# Patient Record
Sex: FEMALE | Race: WHITE | NOT HISPANIC OR LATINO | Employment: FULL TIME | URBAN - METROPOLITAN AREA
[De-identification: names, ages, dates, MRNs, and addresses within clinical notes are randomized per-mention and may not be internally consistent; named-entity substitution may affect disease eponyms.]

---

## 2017-01-09 RX ORDER — METFORMIN HYDROCHLORIDE 500 MG/1
TABLET ORAL
Qty: 60 TABLET | Refills: 0 | OUTPATIENT
Start: 2017-01-09

## 2017-01-23 ENCOUNTER — OFFICE VISIT (OUTPATIENT)
Dept: INTERNAL MEDICINE | Facility: CLINIC | Age: 62
End: 2017-01-23
Payer: COMMERCIAL

## 2017-01-23 ENCOUNTER — LAB VISIT (OUTPATIENT)
Dept: LAB | Facility: HOSPITAL | Age: 62
End: 2017-01-23
Attending: FAMILY MEDICINE
Payer: COMMERCIAL

## 2017-01-23 VITALS
WEIGHT: 223.56 LBS | BODY MASS INDEX: 39.6 KG/M2 | SYSTOLIC BLOOD PRESSURE: 134 MMHG | DIASTOLIC BLOOD PRESSURE: 78 MMHG | TEMPERATURE: 97 F

## 2017-01-23 DIAGNOSIS — E11.59 HYPERTENSION ASSOCIATED WITH DIABETES: Chronic | ICD-10-CM

## 2017-01-23 DIAGNOSIS — E11.9 TYPE 2 DIABETES MELLITUS WITHOUT COMPLICATION, WITHOUT LONG-TERM CURRENT USE OF INSULIN: Primary | Chronic | ICD-10-CM

## 2017-01-23 DIAGNOSIS — I15.2 HYPERTENSION ASSOCIATED WITH DIABETES: Chronic | ICD-10-CM

## 2017-01-23 DIAGNOSIS — E78.5 HYPERLIPIDEMIA ASSOCIATED WITH TYPE 2 DIABETES MELLITUS: Chronic | ICD-10-CM

## 2017-01-23 DIAGNOSIS — E11.9 TYPE 2 DIABETES MELLITUS WITHOUT COMPLICATION, WITHOUT LONG-TERM CURRENT USE OF INSULIN: Chronic | ICD-10-CM

## 2017-01-23 DIAGNOSIS — E11.69 HYPERLIPIDEMIA ASSOCIATED WITH TYPE 2 DIABETES MELLITUS: Chronic | ICD-10-CM

## 2017-01-23 PROCEDURE — 2022F DILAT RTA XM EVC RTNOPTHY: CPT | Mod: S$GLB,,, | Performed by: FAMILY MEDICINE

## 2017-01-23 PROCEDURE — 83036 HEMOGLOBIN GLYCOSYLATED A1C: CPT

## 2017-01-23 PROCEDURE — 1159F MED LIST DOCD IN RCRD: CPT | Mod: S$GLB,,, | Performed by: FAMILY MEDICINE

## 2017-01-23 PROCEDURE — 99999 PR PBB SHADOW E&M-EST. PATIENT-LVL III: CPT | Mod: PBBFAC,,, | Performed by: FAMILY MEDICINE

## 2017-01-23 PROCEDURE — 3075F SYST BP GE 130 - 139MM HG: CPT | Mod: S$GLB,,, | Performed by: FAMILY MEDICINE

## 2017-01-23 PROCEDURE — 3078F DIAST BP <80 MM HG: CPT | Mod: S$GLB,,, | Performed by: FAMILY MEDICINE

## 2017-01-23 PROCEDURE — 4010F ACE/ARB THERAPY RXD/TAKEN: CPT | Mod: S$GLB,,, | Performed by: FAMILY MEDICINE

## 2017-01-23 PROCEDURE — 36415 COLL VENOUS BLD VENIPUNCTURE: CPT | Mod: PO

## 2017-01-23 PROCEDURE — 3044F HG A1C LEVEL LT 7.0%: CPT | Mod: S$GLB,,, | Performed by: FAMILY MEDICINE

## 2017-01-23 PROCEDURE — 99214 OFFICE O/P EST MOD 30 MIN: CPT | Mod: S$GLB,,, | Performed by: FAMILY MEDICINE

## 2017-01-23 RX ORDER — ACETAMINOPHEN 500 MG
1 TABLET ORAL DAILY
COMMUNITY

## 2017-01-23 RX ORDER — METFORMIN HYDROCHLORIDE 500 MG/1
500 TABLET ORAL
Qty: 90 TABLET | Refills: 3 | Status: SHIPPED | OUTPATIENT
Start: 2017-01-23 | End: 2017-04-24 | Stop reason: SDUPTHER

## 2017-01-23 RX ORDER — ASPIRIN 81 MG/1
81 TABLET ORAL DAILY
COMMUNITY

## 2017-01-23 NOTE — PROGRESS NOTES
Subjective:   Patient ID: May Montoya is a 61 y.o. female.  Chief Complaint:  Follow-up    HPI Comments: Patient presents follow-up on prediabetes diabetes.  Last visit June 2016.  A1c 6.5%.  LDL 91.  CMP normal.  Hep C negative.  On metformin 500 mg daily.  Due repeat A1c.  Blood pressure and lipids have been stable and controlled on present medical regimen.  Needs referral for eye exam  Reports doing here up-to-date last month by outside physician.  Reports flu vaccine at pharmacy.  No new complaints concerns today.    Review of Systems   Constitutional: Negative for chills, diaphoresis, fatigue and fever.   Eyes: Negative for visual disturbance.   Respiratory: Negative for cough, chest tightness, shortness of breath and wheezing.    Cardiovascular: Negative for chest pain, palpitations and leg swelling.   Gastrointestinal: Negative for abdominal distention, abdominal pain, constipation, diarrhea, nausea and vomiting.   Endocrine: Negative for polydipsia, polyphagia and polyuria.   Genitourinary: Negative for difficulty urinating, dysuria, flank pain, frequency, hematuria and urgency.   Musculoskeletal: Negative for myalgias.   Skin: Negative for rash.   Neurological: Negative for dizziness, syncope, weakness, light-headedness, numbness and headaches.     Objective:     Visit Vitals    /78    Temp 97.1 °F (36.2 °C) (Tympanic)    Wt 101.4 kg (223 lb 8.7 oz)    BMI 39.6 kg/m2     Physical Exam   Constitutional: Vital signs are normal. She appears well-developed and well-nourished. No distress.   Eyes: No scleral icterus.   Neck: No JVD present. Carotid bruit is not present.   Cardiovascular: Normal rate, regular rhythm and normal heart sounds.  Exam reveals no gallop and no friction rub.    No murmur heard.  Pulses:       Dorsalis pedis pulses are 2+ on the right side, and 2+ on the left side.        Posterior tibial pulses are 2+ on the right side, and 2+ on the left side.   Pulmonary/Chest: Effort  normal and breath sounds normal. She has no wheezes. She has no rhonchi. She has no rales.   Abdominal: Soft. She exhibits no distension. There is no hepatosplenomegaly. There is no tenderness. There is no rebound and no guarding.   Musculoskeletal: She exhibits edema (1+ bilaterally). She exhibits no tenderness.        Right foot: There is normal range of motion and no deformity.        Left foot: There is normal range of motion and no deformity.   Feet:   Right Foot:   Protective Sensation: 10 sites tested. 10 sites sensed.   Skin Integrity: Negative for ulcer, blister, skin breakdown, erythema, warmth, callus or dry skin.   Left Foot:   Protective Sensation: 10 sites tested. 10 sites sensed.   Skin Integrity: Negative for ulcer, blister, skin breakdown, erythema, warmth, callus or dry skin.   Neurological: She displays a negative Romberg sign. Coordination and gait normal.   Skin: Skin is warm, dry and intact. No rash noted.        Psychiatric: She has a normal mood and affect.   Nursing note and vitals reviewed.    Assessment:     1. Type 2 diabetes mellitus without complication, without long-term current use of insulin    2. Hypertension associated with diabetes    3. Hyperlipidemia associated with type 2 diabetes mellitus      Plan:   Type 2 diabetes mellitus without complication, without long-term current use of insulin  -     Hemoglobin A1c; Future; Expected date: 1/23/17  -     Ambulatory referral to Ophthalmology  Check labs.  Adjust metformin daily if indicated.  If stable, continue present dose and recheck A1c in 6 months    Hypertension associated with diabetes  Controlled.  Continue present medications.    Hyperlipidemia associated with type 2 diabetes mellitus  LDL at goal.  Continue Crestor.    Return to clinic 6 months or sooner if needed.

## 2017-01-23 NOTE — MR AVS SNAPSHOT
[facility]  [address] [place] [address]  Phone: [phone]  Fax: [phone]                  May Montoya   2017 10:00 AM   Office Visit    Description:  Female : 1955   Provider:  Macho Castrejon MD   Department:  [facility]           Reason for Visit     Follow-up           Diagnoses this Visit        Comments    Type 2 diabetes mellitus without complication, without long-term current use of insulin    -  Primary     Hypertension associated with diabetes         Hyperlipidemia associated with type 2 diabetes mellitus                To Do List           Future Appointments        Provider Department Dept Phone    2017 1:45 PM LAB, SAME DAY [facility] [phone]    2017 10:20 AM Macho Castrejon MD [facility] [phone]      Goals (5 Years of Data)     None      Follow-Up and Disposition     Return in about 6 months (around 2017).      [facility]     [facility] -  Assistance  Registered nurses in the [facility] provide clinical advisement, health education, appointment booking, and other advisory services.  Call for this free service at [phone].             Medications           Message regarding Medications     Verify the changes and/or additions to your medication regime listed below are the same as discussed with your clinician today.  If any of these changes or additions are incorrect, please notify your healthcare provider.             Verify that the below list of medications is an accurate representation of the medications you are currently taking.  If none reported, the list may be blank. If incorrect, please contact your healthcare provider. Carry this list with you in case of emergency.           Current Medications     amlodipine-benazepril 2.5-10 mg (LOTREL) 2.5-10 mg per capsule Take 1 capsule by mouth once daily.    aspirin  (ECOTRIN) 81 MG EC tablet Take 81 mg by mouth once daily.    CELEBREX 200 mg capsule Take 400 mg by mouth once daily.     cetirizine (ZYRTEC) 5 MG tablet Take 5 mg by mouth once daily.    cholecalciferol, vitamin D3, (VITAMIN D3) 2,000 unit Cap Take 1 capsule by mouth once daily.    GLUCOSAMINE/D3/BOSWELLIA MAYELIN (OSTEO BI-FLEX, 5-LOXIN, ORAL) Take 2 tablets by mouth once daily.    metaxalone (SKELAXIN) 800 MG tablet     metformin (GLUCOPHAGE) 500 MG tablet TAKE 1 TABLET (500 MG TOTAL) BY MOUTH 2 (TWO) TIMES DAILY WITH MEALS.    multivit-mineral-iron-lutein (CENTRUM SILVER ULTRA WOMEN'S) Tab Take 1 tablet by mouth once daily.    rosuvastatin (CRESTOR) 20 MG tablet Take 1 tablet (20 mg total) by mouth every evening.    tramadol (ULTRAM) 50 mg tablet     triamcinolone acetonide 0.1% (KENALOG) 0.1 % cream     fluticasone (FLONASE) 50 mcg/actuation nasal spray 2 sprays by Each Nare route once daily.           Clinical Reference Information           Vital Signs - Last Recorded  Most recent update: 1/23/2017 11:01 AM by Macho Castrejon MD    BP Temp Wt BMI       134/78 97.1 °F (36.2 °C) (Tympanic) 101.4 kg (223 lb 8.7 oz) 39.6 kg/m2       Blood Pressure          Most Recent Value    BP  134/78      Allergies as of 1/23/2017     Avelox [Moxifloxacin]      Immunizations Administered on Date of Encounter - 1/23/2017     None      Orders Placed During Today's Visit      Normal Orders This Visit    Ambulatory referral to Ophthalmology     Future Labs/Procedures Expected by Expires    Hemoglobin A1c  1/23/2017 4/23/2017      [facility] Sign-Up     Activating your [facility] account is as easy as 1-2-3!     1) Visit [email or web address], select Sign Up Now, enter this activation code and your date of birth, then select Next.  [email or web address]  Expires: 3/9/2017 11:03 AM      2) Create a username and password to use when you visit [facility] in the future and select a security question in case you lose your password and select  Next.    3) Enter your e-mail address and click Sign Up!    Additional Information  If you have questions, please e-mail [email or web address] or call [phone] to talk to our [facility] staff. Remember, [facility] is NOT to be used for urgent needs. For medical emergencies, dial 911.

## 2017-01-24 LAB
ESTIMATED AVG GLUCOSE: 148 MG/DL
HBA1C MFR BLD HPLC: 6.8 %

## 2017-01-25 ENCOUNTER — TELEPHONE (OUTPATIENT)
Dept: INTERNAL MEDICINE | Facility: CLINIC | Age: 62
End: 2017-01-25

## 2017-01-25 NOTE — TELEPHONE ENCOUNTER
----- Message from Macho Castrejon MD sent at 1/25/2017  7:36 AM CST -----  A1c stable.  Prediabetes/diabetes well controlled.  Continue metformin daily.  No change in dose.  Recheck in 6 months.

## 2017-01-26 ENCOUNTER — TELEPHONE (OUTPATIENT)
Dept: INTERNAL MEDICINE | Facility: CLINIC | Age: 62
End: 2017-01-26

## 2017-01-26 NOTE — TELEPHONE ENCOUNTER
----- Message from Mariah Starr sent at 1/26/2017  8:06 AM CST -----  Contact: Pt  Pt called and stated she was returning a call to the nurse. She can be reached at [phone].    Thanks,  TF

## 2017-02-06 ENCOUNTER — OFFICE VISIT (OUTPATIENT)
Dept: OPHTHALMOLOGY | Facility: CLINIC | Age: 62
End: 2017-02-06
Payer: COMMERCIAL

## 2017-02-06 DIAGNOSIS — E11.9 DIABETES MELLITUS TYPE 2 WITHOUT RETINOPATHY: Primary | ICD-10-CM

## 2017-02-06 DIAGNOSIS — H52.7 REFRACTIVE ERROR: ICD-10-CM

## 2017-02-06 DIAGNOSIS — Z13.5 GLAUCOMA SCREENING: ICD-10-CM

## 2017-02-06 PROCEDURE — 99999 PR PBB SHADOW E&M-EST. PATIENT-LVL I: CPT | Mod: PBBFAC,,, | Performed by: OPTOMETRIST

## 2017-02-06 PROCEDURE — 92015 DETERMINE REFRACTIVE STATE: CPT | Mod: S$GLB,,, | Performed by: OPTOMETRIST

## 2017-02-06 PROCEDURE — 92004 COMPRE OPH EXAM NEW PT 1/>: CPT | Mod: S$GLB,,, | Performed by: OPTOMETRIST

## 2017-02-06 NOTE — LETTER
February 6, 2017      Macho Castrejon MD  [address] [place] [address]           [facility]  [address] [place] [address]  Phone: [phone]  Fax: [phone]          Patient: May Montoya   MR Number: [id number]   YOB: 1955   Date of Visit: 2/6/2017       Dear Dr. Macho Castrejon:    Thank you for referring May Montoya to me for evaluation. Attached you will find relevant portions of my assessment and plan of care.    If you have questions, please do not hesitate to call me. I look forward to following May Montoya along with you.    Sincerely,    GAMA Rahman, OD    Enclosure  CC:  No Recipients    If you would like to receive this communication electronically, please contact [email or web address] or [phone] to request more information on [facility] access.    For providers and/or their staff who would like to refer a patient to [facility], please contact us through our one-stop-shop provider referral line, Karyn Faulkner, at [phone].    If you feel you have received this communication in error or would no longer like to receive these types of communications, please e-mail [email or web address]

## 2017-02-06 NOTE — PROGRESS NOTES
HPI     Last [facility] exam 11/18/2013  Diabetic/NIDDM  Screening for glaucoma  RE  No visual complaints       Last edited by Frederick Drew MA on 2/6/2017  8:37 AM.         Assessment /Plan     For exam results, see Encounter Report.    Diabetes mellitus type 2 without retinopathy    Cataract, nuclear, bilateral    Glaucoma screening    Refractive error      No diabetic retinopathy OU.  Mild NS cataracts OU = discussed and will follow.  OH OK OU otherwise.  Spec Rx given.  RTC one year.

## 2017-04-24 ENCOUNTER — TELEPHONE (OUTPATIENT)
Dept: INTERNAL MEDICINE | Facility: CLINIC | Age: 62
End: 2017-04-24

## 2017-04-24 DIAGNOSIS — E11.59 HYPERTENSION ASSOCIATED WITH DIABETES: Chronic | ICD-10-CM

## 2017-04-24 DIAGNOSIS — E11.69 HYPERLIPIDEMIA ASSOCIATED WITH TYPE 2 DIABETES MELLITUS: Primary | Chronic | ICD-10-CM

## 2017-04-24 DIAGNOSIS — E11.9 TYPE 2 DIABETES MELLITUS WITHOUT COMPLICATION, WITHOUT LONG-TERM CURRENT USE OF INSULIN: Chronic | ICD-10-CM

## 2017-04-24 DIAGNOSIS — E78.5 HYPERLIPIDEMIA ASSOCIATED WITH TYPE 2 DIABETES MELLITUS: Primary | Chronic | ICD-10-CM

## 2017-04-24 DIAGNOSIS — I15.2 HYPERTENSION ASSOCIATED WITH DIABETES: Chronic | ICD-10-CM

## 2017-04-24 PROBLEM — I10 ESSENTIAL HYPERTENSION: Status: ACTIVE | Noted: 2017-04-24

## 2017-04-24 RX ORDER — ROSUVASTATIN CALCIUM 20 MG/1
20 TABLET, COATED ORAL NIGHTLY
Qty: 90 TABLET | Refills: 3 | Status: SHIPPED | OUTPATIENT
Start: 2017-04-24 | End: 2017-06-23 | Stop reason: SDUPTHER

## 2017-04-24 RX ORDER — AMLODIPINE BESYLATE AND BENAZEPRIL HYDROCHLORIDE 2.5; 1 MG/1; MG/1
1 CAPSULE ORAL DAILY
Qty: 90 CAPSULE | Refills: 3 | Status: SHIPPED | OUTPATIENT
Start: 2017-04-24 | End: 2017-06-23 | Stop reason: SDUPTHER

## 2017-04-24 RX ORDER — METFORMIN HYDROCHLORIDE 500 MG/1
500 TABLET ORAL
Qty: 90 TABLET | Refills: 3 | Status: SHIPPED | OUTPATIENT
Start: 2017-04-24 | End: 2017-06-23 | Stop reason: SDUPTHER

## 2017-04-24 NOTE — TELEPHONE ENCOUNTER
----- Message from Ayaka Ibarra sent at 4/24/2017  8:08 AM CDT -----  Contact: pt  Pt needs refill on Metformin, Amlodipime, Crestor.

## 2017-04-24 NOTE — TELEPHONE ENCOUNTER
Last visit 1/23/17. Last refill for Metformin was 1/23/17, last refill for amlodipine-benazepril was 6/28/16, and last refill for crestor was 6/28/16. Next appt 7/24/17. Please advise.

## 2017-06-23 DIAGNOSIS — E78.5 HYPERLIPIDEMIA ASSOCIATED WITH TYPE 2 DIABETES MELLITUS: Chronic | ICD-10-CM

## 2017-06-23 DIAGNOSIS — E11.69 HYPERLIPIDEMIA ASSOCIATED WITH TYPE 2 DIABETES MELLITUS: Chronic | ICD-10-CM

## 2017-06-23 DIAGNOSIS — E11.59 HYPERTENSION ASSOCIATED WITH DIABETES: Chronic | ICD-10-CM

## 2017-06-23 DIAGNOSIS — E11.9 TYPE 2 DIABETES MELLITUS WITHOUT COMPLICATION, WITHOUT LONG-TERM CURRENT USE OF INSULIN: Chronic | ICD-10-CM

## 2017-06-23 DIAGNOSIS — I15.2 HYPERTENSION ASSOCIATED WITH DIABETES: Chronic | ICD-10-CM

## 2017-06-23 RX ORDER — ROSUVASTATIN CALCIUM 20 MG/1
20 TABLET, COATED ORAL NIGHTLY
Qty: 90 TABLET | Refills: 3 | Status: SHIPPED | OUTPATIENT
Start: 2017-06-23 | End: 2017-09-12

## 2017-06-23 RX ORDER — METFORMIN HYDROCHLORIDE 500 MG/1
500 TABLET ORAL
Qty: 90 TABLET | Refills: 3 | Status: SHIPPED | OUTPATIENT
Start: 2017-06-23 | End: 2018-03-12 | Stop reason: SDUPTHER

## 2017-06-23 RX ORDER — AMLODIPINE BESYLATE AND BENAZEPRIL HYDROCHLORIDE 2.5; 1 MG/1; MG/1
1 CAPSULE ORAL DAILY
Qty: 90 CAPSULE | Refills: 3 | Status: SHIPPED | OUTPATIENT
Start: 2017-06-23 | End: 2017-08-07 | Stop reason: SDUPTHER

## 2017-06-23 NOTE — TELEPHONE ENCOUNTER
----- Message from Aldafide Anton sent at 6/23/2017  8:07 AM CDT -----  Contact: pt  1. What is the name of the medication you are requesting? Metformin, amlodipine, crestor  2. What is the dose? Met-500 mg, amlodipine 10 mg- crestro 20mg  3. How do you take the medication? Orally, topically, etc? orally  4. How often do you take this medication? All daily  5. Do you need a 30 day or 90 day supply? 30  6. How many refills are you requesting? 6  7. What is your preferred pharmacy and location of the pharmacy? [facility]  8. Who can we contact with further questions? Pt [phone]

## 2017-06-23 NOTE — TELEPHONE ENCOUNTER
Last visit 1/23/17. Last refill for Metformin 4/24/17. Last refill for amlodipine 4/24/17. Last refill Crestor 4/24/17.

## 2017-07-10 ENCOUNTER — PATIENT OUTREACH (OUTPATIENT)
Dept: ADMINISTRATIVE | Facility: HOSPITAL | Age: 62
End: 2017-07-10

## 2017-07-10 NOTE — LETTER
July 10, 2017    May Montoya  [address]  [place] [address]             [facility]  [address]  [facility] [address]  Phone: [phone] Dear MsNba Montoya:    [facility] is committed to your overall health.  To help you get the most out of each of your visits, we will review your information to make sure you are up to date on all of your recommended tests and/or procedures.      Macho Castrejon MD has found that you may be due for   Health Maintenance Due   Topic    TETANUS VACCINE     Colonoscopy     Zoster Vaccine     Mammogram     Lipid Panel         If you have had any of the above done at another facility, please bring the records or information with you so that your record at [facility] will be complete.    If you are currently taking medication, please bring it with you to your appointment for review.    We will be happy to assist you with scheduling any necessary appointments or you may contact the [facility] appointment desk at [phone] to schedule at your convenience.     Thank you for choosing [facility] for your healthcare needs,    Gabby WHITE LPN Care Coordinator  [facility]  [phone]

## 2017-08-07 ENCOUNTER — OFFICE VISIT (OUTPATIENT)
Dept: INTERNAL MEDICINE | Facility: CLINIC | Age: 62
End: 2017-08-07
Payer: COMMERCIAL

## 2017-08-07 VITALS
HEIGHT: 63 IN | DIASTOLIC BLOOD PRESSURE: 70 MMHG | HEART RATE: 79 BPM | WEIGHT: 220.88 LBS | TEMPERATURE: 97 F | OXYGEN SATURATION: 97 % | BODY MASS INDEX: 39.14 KG/M2 | SYSTOLIC BLOOD PRESSURE: 118 MMHG

## 2017-08-07 DIAGNOSIS — E11.69 HYPERLIPIDEMIA ASSOCIATED WITH TYPE 2 DIABETES MELLITUS: Chronic | ICD-10-CM

## 2017-08-07 DIAGNOSIS — I15.2 HYPERTENSION ASSOCIATED WITH DIABETES: Chronic | ICD-10-CM

## 2017-08-07 DIAGNOSIS — E11.59 HYPERTENSION ASSOCIATED WITH DIABETES: Chronic | ICD-10-CM

## 2017-08-07 DIAGNOSIS — E78.5 HYPERLIPIDEMIA ASSOCIATED WITH TYPE 2 DIABETES MELLITUS: Chronic | ICD-10-CM

## 2017-08-07 DIAGNOSIS — E11.9 TYPE 2 DIABETES MELLITUS WITHOUT COMPLICATION, WITHOUT LONG-TERM CURRENT USE OF INSULIN: Primary | Chronic | ICD-10-CM

## 2017-08-07 PROCEDURE — 99214 OFFICE O/P EST MOD 30 MIN: CPT | Mod: S$GLB,,, | Performed by: FAMILY MEDICINE

## 2017-08-07 PROCEDURE — 4010F ACE/ARB THERAPY RXD/TAKEN: CPT | Mod: S$GLB,,, | Performed by: FAMILY MEDICINE

## 2017-08-07 PROCEDURE — 3008F BODY MASS INDEX DOCD: CPT | Mod: S$GLB,,, | Performed by: FAMILY MEDICINE

## 2017-08-07 PROCEDURE — 99999 PR PBB SHADOW E&M-EST. PATIENT-LVL III: CPT | Mod: PBBFAC,,, | Performed by: FAMILY MEDICINE

## 2017-08-07 PROCEDURE — 3044F HG A1C LEVEL LT 7.0%: CPT | Mod: S$GLB,,, | Performed by: FAMILY MEDICINE

## 2017-08-07 RX ORDER — AMLODIPINE BESYLATE AND BENAZEPRIL HYDROCHLORIDE 2.5; 1 MG/1; MG/1
1 CAPSULE ORAL DAILY
Qty: 90 CAPSULE | Refills: 3 | Status: SHIPPED | OUTPATIENT
Start: 2017-08-07 | End: 2018-03-12 | Stop reason: SDUPTHER

## 2017-08-07 RX ORDER — RIVAROXABAN 20 MG/1
TABLET, FILM COATED ORAL DAILY
COMMUNITY
Start: 2017-07-31 | End: 2017-12-04 | Stop reason: ALTCHOICE

## 2017-08-07 NOTE — PROGRESS NOTES
Subjective:   Patient ID: May Montoya is a 61 y.o. female.  Chief Complaint:  Follow-up      Six-month follow-up on hypertension, diabetes, hyperlipidemia.  Last lab values all acceptable and at goal.  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  Procedure done to decrease recurrent leg swelling and cellulitis.  Dr. Drew [facility] surgeon.  Stent placed.  Also started on Xarelto  Follow-up scheduled next week.      Review of Systems   Constitutional: Negative for chills, diaphoresis, fatigue and fever.   Eyes: Negative for visual disturbance.   Respiratory: Negative for cough, chest tightness, shortness of breath and wheezing.    Cardiovascular: Positive for leg swelling. Negative for chest pain and palpitations.   Gastrointestinal: Negative for abdominal distention, abdominal pain, constipation, diarrhea, nausea and vomiting.   Endocrine: Negative for polydipsia, polyphagia and polyuria.   Genitourinary: Negative for difficulty urinating, dysuria, flank pain, frequency, hematuria and urgency.   Musculoskeletal: Negative for myalgias.   Skin: Positive for wound. Negative for rash.   Neurological: Negative for dizziness, syncope, weakness, light-headedness, numbness and headaches.       Current Outpatient Prescriptions:     amlodipine-benazepril 2.5-10 mg (LOTREL) 2.5-10 mg per capsule, Take 1 capsule by mouth once daily., Disp: 90 capsule, Rfl: 3    aspirin (ECOTRIN) 81 MG EC tablet, Take 81 mg by mouth once daily., Disp: , Rfl:     cetirizine (ZYRTEC) 5 MG tablet, Take 5 mg by mouth once daily., Disp: , Rfl:     cholecalciferol, vitamin D3, (VITAMIN D3) 2,000 unit Cap, Take 1 capsule by mouth once daily., Disp: , Rfl:     GLUCOSAMINE/D3/BOSWELLIA MAYELIN (OSTEO BI-FLEX, 5-LOXIN, ORAL), Take 2 tablets by mouth once daily., Disp: , Rfl:     metaxalone (SKELAXIN) 800 MG tablet, , Disp: , Rfl:     metformin (GLUCOPHAGE) 500 MG tablet, Take 1  "tablet (500 mg total) by mouth daily with breakfast., Disp: 90 tablet, Rfl: 3    multivit-mineral-iron-lutein (CENTRUM SILVER ULTRA WOMEN'S) Tab, Take 1 tablet by mouth once daily., Disp: , Rfl:     rosuvastatin (CRESTOR) 20 MG tablet, Take 1 tablet (20 mg total) by mouth every evening., Disp: 90 tablet, Rfl: 3    XARELTO 20 mg Tab, once daily., Disp: , Rfl:     CELEBREX 200 mg capsule, Take 400 mg by mouth once daily. , Disp: , Rfl:     Objective:   /70 (BP Location: Right arm, Patient Position: Sitting, BP Method: Manual)   Pulse 79   Temp 97.4 °F (36.3 °C) (Tympanic)   Ht 5' 3" (1.6 m)   Wt 100.2 kg (220 lb 14.4 oz)   SpO2 97%   BMI 39.13 kg/m²     Physical Exam   Constitutional: Vital signs are normal. She appears well-developed and well-nourished. No distress.   Eyes: No scleral icterus.   Neck: No JVD present. Carotid bruit is not present.   Cardiovascular: Normal rate, regular rhythm and normal heart sounds.  Exam reveals no gallop and no friction rub.    No murmur heard.  Pulmonary/Chest: Effort normal and breath sounds normal. She has no wheezes. She has no rhonchi. She has no rales.   Abdominal: Soft. She exhibits no distension. There is no hepatosplenomegaly. There is no tenderness. There is no rebound and no guarding.   Musculoskeletal: She exhibits no edema.   Neurological: She displays a negative Romberg sign. Coordination and gait normal.   Skin: Skin is warm and dry. No rash noted.   Left lower leg wrapped in compression dressing.  Minimal swelling.  Good pulses.  No external skin breakdown noted.   Psychiatric: She has a normal mood and affect.   Nursing note and vitals reviewed.    Assessment:     1. Type 2 diabetes mellitus without complication, without long-term current use of insulin    2. Hypertension associated with diabetes    3. Hyperlipidemia associated with type 2 diabetes mellitus      Plan:   Type 2 diabetes mellitus without complication, without long-term current use of " insulin  -     Hemoglobin A1c; Future; Expected date: 08/21/2017  Adjust metformin 500 mg daily if needed    Hypertension associated with diabetes  -     amlodipine-benazepril 2.5-10 mg (LOTREL) 2.5-10 mg per capsule; Take 1 capsule by mouth once daily.  Dispense: 90 capsule; Refill: 3  Controlled.  BP at goal.  Continue Lotrel 2.5/10 mg daily.      Hyperlipidemia associated with type 2 diabetes mellitus  -     Lipid panel; Future; Expected date: 08/21/2017  Goal LDL less than 100 and diabetic.  Adjust Crestor 20 mg daily if needed.    Follow-up with vascular surgery is scheduled.  Declines any routine health maintenance up-to-date today, concerned about injections and procedures while on Xarelto.  Return to clinic 6 months or sooner as needed.

## 2017-09-12 ENCOUNTER — LAB VISIT (OUTPATIENT)
Dept: LAB | Facility: HOSPITAL | Age: 62
End: 2017-09-12
Attending: FAMILY MEDICINE
Payer: COMMERCIAL

## 2017-09-12 ENCOUNTER — OFFICE VISIT (OUTPATIENT)
Dept: INTERNAL MEDICINE | Facility: CLINIC | Age: 62
End: 2017-09-12
Payer: COMMERCIAL

## 2017-09-12 VITALS
TEMPERATURE: 97 F | HEIGHT: 63 IN | DIASTOLIC BLOOD PRESSURE: 80 MMHG | BODY MASS INDEX: 39.26 KG/M2 | HEART RATE: 84 BPM | WEIGHT: 221.56 LBS | SYSTOLIC BLOOD PRESSURE: 132 MMHG

## 2017-09-12 DIAGNOSIS — E78.5 HYPERLIPIDEMIA: ICD-10-CM

## 2017-09-12 DIAGNOSIS — E11.59 HYPERTENSION ASSOCIATED WITH DIABETES: Chronic | ICD-10-CM

## 2017-09-12 DIAGNOSIS — E11.59 HYPERTENSION ASSOCIATED WITH DIABETES: Primary | Chronic | ICD-10-CM

## 2017-09-12 DIAGNOSIS — E11.9 TYPE 2 DIABETES MELLITUS WITHOUT COMPLICATION, WITHOUT LONG-TERM CURRENT USE OF INSULIN: Chronic | ICD-10-CM

## 2017-09-12 DIAGNOSIS — I15.2 HYPERTENSION ASSOCIATED WITH DIABETES: Primary | Chronic | ICD-10-CM

## 2017-09-12 DIAGNOSIS — I15.2 HYPERTENSION ASSOCIATED WITH DIABETES: Chronic | ICD-10-CM

## 2017-09-12 LAB
ALBUMIN SERPL BCP-MCNC: 3.6 G/DL
ALP SERPL-CCNC: 64 U/L
ALT SERPL W/O P-5'-P-CCNC: 19 U/L
ANION GAP SERPL CALC-SCNC: 7 MMOL/L
AST SERPL-CCNC: 20 U/L
BILIRUB SERPL-MCNC: 0.5 MG/DL
BUN SERPL-MCNC: 10 MG/DL
CALCIUM SERPL-MCNC: 9.3 MG/DL
CHLORIDE SERPL-SCNC: 104 MMOL/L
CO2 SERPL-SCNC: 28 MMOL/L
CREAT SERPL-MCNC: 0.7 MG/DL
EST. GFR  (AFRICAN AMERICAN): >60 ML/MIN/1.73 M^2
EST. GFR  (NON AFRICAN AMERICAN): >60 ML/MIN/1.73 M^2
ESTIMATED AVG GLUCOSE: 143 MG/DL
GLUCOSE SERPL-MCNC: 90 MG/DL
HBA1C MFR BLD HPLC: 6.6 %
POTASSIUM SERPL-SCNC: 4 MMOL/L
PROT SERPL-MCNC: 7.2 G/DL
SODIUM SERPL-SCNC: 139 MMOL/L

## 2017-09-12 PROCEDURE — 3079F DIAST BP 80-89 MM HG: CPT | Mod: S$GLB,,, | Performed by: FAMILY MEDICINE

## 2017-09-12 PROCEDURE — 99999 PR PBB SHADOW E&M-EST. PATIENT-LVL III: CPT | Mod: PBBFAC,,, | Performed by: FAMILY MEDICINE

## 2017-09-12 PROCEDURE — 3044F HG A1C LEVEL LT 7.0%: CPT | Mod: S$GLB,,, | Performed by: FAMILY MEDICINE

## 2017-09-12 PROCEDURE — 4010F ACE/ARB THERAPY RXD/TAKEN: CPT | Mod: S$GLB,,, | Performed by: FAMILY MEDICINE

## 2017-09-12 PROCEDURE — 83036 HEMOGLOBIN GLYCOSYLATED A1C: CPT

## 2017-09-12 PROCEDURE — 36415 COLL VENOUS BLD VENIPUNCTURE: CPT | Mod: PO

## 2017-09-12 PROCEDURE — 80053 COMPREHEN METABOLIC PANEL: CPT

## 2017-09-12 PROCEDURE — 3008F BODY MASS INDEX DOCD: CPT | Mod: S$GLB,,, | Performed by: FAMILY MEDICINE

## 2017-09-12 PROCEDURE — 3075F SYST BP GE 130 - 139MM HG: CPT | Mod: S$GLB,,, | Performed by: FAMILY MEDICINE

## 2017-09-12 PROCEDURE — 99213 OFFICE O/P EST LOW 20 MIN: CPT | Mod: S$GLB,,, | Performed by: FAMILY MEDICINE

## 2017-09-12 RX ORDER — ROSUVASTATIN CALCIUM 20 MG/1
TABLET, COATED ORAL
Qty: 90 TABLET | Refills: 0 | Status: SHIPPED | OUTPATIENT
Start: 2017-09-12 | End: 2017-12-04 | Stop reason: ALTCHOICE

## 2017-09-12 NOTE — PROGRESS NOTES
Subjective:       Patient ID: May Montoya is a 62 y.o. female.    Chief Complaint: Hospital Follow Up and Fall    F/U:       Pt is a 62 year old passed out about 2 weeks ago. Pt was found to have low pressure and tachy leading to dehydrations. Pt Cardiac work-up was normal and no pulmonary involvement      Fall   The accident occurred more than 1 week ago. The fall occurred in unknown circumstances. She fell from a height of 3 to 5 ft. The point of impact was the left elbow. The pain is present in the left elbow. The pain is at a severity of 6/10. The pain is mild. Exacerbated by: poor hydration. Pertinent negatives include no bowel incontinence, hematuria, loss of consciousness, tingling or visual change. She has tried nothing for the symptoms. The treatment provided significant relief.     Review of Systems   Constitutional: Negative.    Respiratory: Negative.    Cardiovascular: Negative.    Gastrointestinal: Negative.  Negative for bowel incontinence.   Genitourinary: Negative.  Negative for hematuria.   Musculoskeletal: Negative.    Neurological: Negative.  Negative for tingling and loss of consciousness.   Hematological: Does not bruise/bleed easily.   Psychiatric/Behavioral: Negative.        Objective:      Physical Exam   Constitutional: She is oriented to person, place, and time. She appears well-developed and well-nourished.   Cardiovascular: Normal rate and regular rhythm.    Pulmonary/Chest: Effort normal and breath sounds normal.   Abdominal: Soft. Bowel sounds are normal.   Neurological: She is alert and oriented to person, place, and time.   Skin: Skin is warm and dry.       Assessment:       1. Hypertension associated with diabetes    2. Type 2 diabetes mellitus without complication, without long-term current use of insulin        Plan:       Hypertension associated with diabetes  Comments:  Will continue to monitor her BP and will get CMP  Orders:  -     Comprehensive metabolic panel; Future;  Expected date: 09/12/2017    Type 2 diabetes mellitus without complication, without long-term current use of insulin  Comments:  Will get a HgA1C  Orders:  -     Hemoglobin A1c; Future; Expected date: 09/12/2017

## 2017-09-15 ENCOUNTER — TELEPHONE (OUTPATIENT)
Dept: INTERNAL MEDICINE | Facility: CLINIC | Age: 62
End: 2017-09-15

## 2017-09-15 NOTE — TELEPHONE ENCOUNTER
----- Message from Sury Harley sent at 9/15/2017 12:30 PM CDT -----  Contact: Pt  Pt is returning the nurse call regarding lab results. Pls call pt back at [phone].

## 2017-09-18 ENCOUNTER — LAB VISIT (OUTPATIENT)
Dept: LAB | Facility: HOSPITAL | Age: 62
End: 2017-09-18
Attending: FAMILY MEDICINE
Payer: COMMERCIAL

## 2017-09-18 DIAGNOSIS — E78.5 HYPERLIPIDEMIA ASSOCIATED WITH TYPE 2 DIABETES MELLITUS: Chronic | ICD-10-CM

## 2017-09-18 DIAGNOSIS — E11.69 HYPERLIPIDEMIA ASSOCIATED WITH TYPE 2 DIABETES MELLITUS: Chronic | ICD-10-CM

## 2017-09-18 LAB
CHOLEST SERPL-MCNC: 187 MG/DL
CHOLEST/HDLC SERPL: 3.7 {RATIO}
HDLC SERPL-MCNC: 51 MG/DL
HDLC SERPL: 27.3 %
LDLC SERPL CALC-MCNC: 107.2 MG/DL
NONHDLC SERPL-MCNC: 136 MG/DL
TRIGL SERPL-MCNC: 144 MG/DL

## 2017-09-18 PROCEDURE — 36415 COLL VENOUS BLD VENIPUNCTURE: CPT | Mod: PO

## 2017-09-18 PROCEDURE — 80061 LIPID PANEL: CPT

## 2017-09-20 ENCOUNTER — TELEPHONE (OUTPATIENT)
Dept: INTERNAL MEDICINE | Facility: CLINIC | Age: 62
End: 2017-09-20

## 2017-09-20 NOTE — TELEPHONE ENCOUNTER
----- Message from Macho Castrejon MD sent at 9/19/2017 12:51 PM CDT -----  Lipids stable but LDL > 100 which is goal for DM. Previously at goal therefore discontinue Crestor 20 mg daily.  Try to follow more stringent low-fat low-cholesterol diet.  Recheck 1 year.

## 2017-09-25 ENCOUNTER — TELEPHONE (OUTPATIENT)
Dept: INTERNAL MEDICINE | Facility: CLINIC | Age: 62
End: 2017-09-25

## 2017-09-25 NOTE — TELEPHONE ENCOUNTER
----- Message from Cora Kyel sent at 9/25/2017 10:09 AM CDT -----  Would like to speak to nurse about results. Please call back at [phone]. thanks

## 2017-12-04 ENCOUNTER — PATIENT OUTREACH (OUTPATIENT)
Dept: ADMINISTRATIVE | Facility: HOSPITAL | Age: 62
End: 2017-12-04

## 2017-12-04 ENCOUNTER — OFFICE VISIT (OUTPATIENT)
Dept: INTERNAL MEDICINE | Facility: CLINIC | Age: 62
End: 2017-12-04
Payer: COMMERCIAL

## 2017-12-04 VITALS
HEIGHT: 63 IN | SYSTOLIC BLOOD PRESSURE: 132 MMHG | BODY MASS INDEX: 39.61 KG/M2 | OXYGEN SATURATION: 98 % | DIASTOLIC BLOOD PRESSURE: 76 MMHG | HEART RATE: 124 BPM | WEIGHT: 223.56 LBS | TEMPERATURE: 98 F

## 2017-12-04 DIAGNOSIS — R05.9 COUGH: ICD-10-CM

## 2017-12-04 DIAGNOSIS — J32.0 MAXILLARY SINUSITIS, UNSPECIFIED CHRONICITY: Primary | ICD-10-CM

## 2017-12-04 PROCEDURE — 99999 PR PBB SHADOW E&M-EST. PATIENT-LVL III: CPT | Mod: PBBFAC,,, | Performed by: FAMILY MEDICINE

## 2017-12-04 PROCEDURE — 99214 OFFICE O/P EST MOD 30 MIN: CPT | Mod: 25,S$GLB,, | Performed by: FAMILY MEDICINE

## 2017-12-04 PROCEDURE — 96372 THER/PROPH/DIAG INJ SC/IM: CPT | Mod: S$GLB,,, | Performed by: FAMILY MEDICINE

## 2017-12-04 RX ORDER — TRIAMCINOLONE ACETONIDE 40 MG/ML
40 INJECTION, SUSPENSION INTRA-ARTICULAR; INTRAMUSCULAR
Status: COMPLETED | OUTPATIENT
Start: 2017-12-04 | End: 2017-12-04

## 2017-12-04 RX ORDER — AZITHROMYCIN 250 MG/1
TABLET, FILM COATED ORAL
Qty: 6 TABLET | Refills: 0 | Status: SHIPPED | OUTPATIENT
Start: 2017-12-04 | End: 2017-12-09

## 2017-12-04 RX ORDER — ALBUTEROL SULFATE 90 UG/1
1 AEROSOL, METERED RESPIRATORY (INHALATION) EVERY 8 HOURS PRN
Qty: 1 INHALER | Refills: 11 | Status: SHIPPED | OUTPATIENT
Start: 2017-12-04 | End: 2018-03-12 | Stop reason: ALTCHOICE

## 2017-12-04 RX ORDER — PROMETHAZINE HYDROCHLORIDE AND DEXTROMETHORPHAN HYDROBROMIDE 6.25; 15 MG/5ML; MG/5ML
5 SYRUP ORAL NIGHTLY
Qty: 50 ML | Refills: 0 | Status: SHIPPED | OUTPATIENT
Start: 2017-12-04 | End: 2017-12-14

## 2017-12-04 RX ORDER — METHYLPREDNISOLONE 4 MG/1
TABLET ORAL
Qty: 1 PACKAGE | Refills: 0 | Status: SHIPPED | OUTPATIENT
Start: 2017-12-04 | End: 2017-12-25

## 2017-12-04 RX ADMIN — TRIAMCINOLONE ACETONIDE 40 MG: 40 INJECTION, SUSPENSION INTRA-ARTICULAR; INTRAMUSCULAR at 04:12

## 2017-12-04 NOTE — PROGRESS NOTES
Subjective:       Patient ID: May Montoya is a 62 y.o. female.    Chief Complaint: Cough; Headache; and Chest Congestion    Sinus congestion:      Pt is a 62 year old with increase sinus congestion and cough going on since sa      Cough   This is a new problem. The current episode started in the past 7 days. The problem has been unchanged. The cough is productive of sputum. Associated symptoms include nasal congestion, postnasal drip and rhinorrhea. Nothing aggravates the symptoms. She has tried nothing for the symptoms. There is no history of COPD or emphysema.     Review of Systems   HENT: Positive for postnasal drip, rhinorrhea and sinus pain.    Respiratory: Positive for cough.    Cardiovascular: Negative.    Genitourinary: Negative.    Neurological: Negative.    Hematological: Negative.    Psychiatric/Behavioral: Negative.        Objective:      Physical Exam   Constitutional: She appears well-developed and well-nourished.   HENT:   Right Ear: Tympanic membrane is erythematous.   Left Ear: Tympanic membrane is erythematous.   Nose: Mucosal edema and rhinorrhea present. Right sinus exhibits maxillary sinus tenderness. Left sinus exhibits maxillary sinus tenderness.   Mouth/Throat: Posterior oropharyngeal erythema present.   Cardiovascular: Normal rate and regular rhythm.    Pulmonary/Chest: Effort normal. She has wheezes.       Assessment:       1. Maxillary sinusitis, unspecified chronicity    2. Cough        Plan:       Maxillary sinusitis, unspecified chronicity  Comments:  Will start Azithromycin and medrol dose pack  Orders:  -     triamcinolone acetonide injection 40 mg; Inject 1 mL (40 mg total) into the muscle one time.    Cough  Comments:  Will do phenergan DM and medrol dose pack. Will do Kenalog IM 40    Other orders  -     Cancel: Case request GI: COLONOSCOPY  -     azithromycin (Z-LEDY) 250 MG tablet; Take 2 tablets by mouth on day 1; Take 1 tablet by mouth on days 2-5  Dispense: 6 tablet; Refill:  0  -     promethazine-dextromethorphan (PROMETHAZINE-DM) 6.25-15 mg/5 mL Syrp; Take 5 mLs by mouth every evening.  Dispense: 50 mL; Refill: 0  -     albuterol 90 mcg/actuation inhaler; Inhale 1 puff into the lungs every 8 (eight) hours as needed for Wheezing. Dispense with spacer.  Dispense: 1 Inhaler; Refill: 11  -     methylPREDNISolone (MEDROL DOSEPACK) 4 mg tablet; use as directed  Dispense: 1 Package; Refill: 0

## 2018-02-16 DIAGNOSIS — E11.9 TYPE 2 DIABETES MELLITUS WITHOUT COMPLICATION: ICD-10-CM

## 2018-02-27 ENCOUNTER — TELEPHONE (OUTPATIENT)
Dept: INTERNAL MEDICINE | Facility: CLINIC | Age: 63
End: 2018-02-27

## 2018-02-27 NOTE — TELEPHONE ENCOUNTER
----- Message from Lisa Vicente sent at 2/27/2018  8:40 AM CST -----  Contact: pt  She's calling in regards to speak with nurse she stated she has a few questions concerning blood work and appt pls call pt back at [phone] (home)

## 2018-03-06 ENCOUNTER — OFFICE VISIT (OUTPATIENT)
Dept: OPHTHALMOLOGY | Facility: CLINIC | Age: 63
End: 2018-03-06
Payer: COMMERCIAL

## 2018-03-06 DIAGNOSIS — Z13.5 GLAUCOMA SCREENING: ICD-10-CM

## 2018-03-06 DIAGNOSIS — H25.13 CATARACT, NUCLEAR SCLEROTIC SENILE, BILATERAL: ICD-10-CM

## 2018-03-06 DIAGNOSIS — E11.9 DIABETES MELLITUS TYPE 2 WITHOUT RETINOPATHY: Primary | ICD-10-CM

## 2018-03-06 DIAGNOSIS — H52.7 REFRACTIVE ERROR: ICD-10-CM

## 2018-03-06 PROCEDURE — 99999 PR PBB SHADOW E&M-EST. PATIENT-LVL I: CPT | Mod: PBBFAC,,, | Performed by: OPTOMETRIST

## 2018-03-06 PROCEDURE — 92015 DETERMINE REFRACTIVE STATE: CPT | Mod: S$GLB,,, | Performed by: OPTOMETRIST

## 2018-03-06 PROCEDURE — 92014 COMPRE OPH EXAM EST PT 1/>: CPT | Mod: S$GLB,,, | Performed by: OPTOMETRIST

## 2018-03-06 NOTE — PROGRESS NOTES
HPI     Last [facility] exam 02/06/2017  Diabetic/NIDDM  Cataract, nuclear, bilateral  Screening for glaucoma  RE  No visual complaints      Last edited by Frederick Drew MA on 3/6/2018  8:53 AM. (History)            Assessment /Plan     For exam results, see Encounter Report.    Diabetes mellitus type 2 without retinopathy    Cataract, nuclear sclerotic senile, bilateral    Glaucoma screening    Refractive error      No diabetic retinopathy OU.  Mild NS cataracts OU = will follow.  OH OK OU otherwise.  Spec Rx given.  RTC one year.

## 2018-03-12 ENCOUNTER — LAB VISIT (OUTPATIENT)
Dept: LAB | Facility: HOSPITAL | Age: 63
End: 2018-03-12
Attending: FAMILY MEDICINE
Payer: COMMERCIAL

## 2018-03-12 ENCOUNTER — OFFICE VISIT (OUTPATIENT)
Dept: INTERNAL MEDICINE | Facility: CLINIC | Age: 63
End: 2018-03-12
Payer: COMMERCIAL

## 2018-03-12 VITALS
SYSTOLIC BLOOD PRESSURE: 134 MMHG | HEART RATE: 85 BPM | HEIGHT: 63 IN | TEMPERATURE: 97 F | WEIGHT: 227.06 LBS | DIASTOLIC BLOOD PRESSURE: 80 MMHG | BODY MASS INDEX: 40.23 KG/M2

## 2018-03-12 DIAGNOSIS — Z12.11 ENCOUNTER FOR COLORECTAL CANCER SCREENING: ICD-10-CM

## 2018-03-12 DIAGNOSIS — E11.9 TYPE 2 DIABETES MELLITUS WITHOUT COMPLICATION, WITHOUT LONG-TERM CURRENT USE OF INSULIN: Chronic | ICD-10-CM

## 2018-03-12 DIAGNOSIS — I15.2 HYPERTENSION ASSOCIATED WITH DIABETES: Chronic | ICD-10-CM

## 2018-03-12 DIAGNOSIS — E11.59 HYPERTENSION ASSOCIATED WITH DIABETES: Chronic | ICD-10-CM

## 2018-03-12 DIAGNOSIS — Z00.00 ANNUAL PHYSICAL EXAM: Primary | ICD-10-CM

## 2018-03-12 DIAGNOSIS — Z12.12 ENCOUNTER FOR COLORECTAL CANCER SCREENING: ICD-10-CM

## 2018-03-12 LAB
CREAT UR-MCNC: 39 MG/DL
MICROALBUMIN UR DL<=1MG/L-MCNC: <2.5 UG/ML
MICROALBUMIN/CREATININE RATIO: NORMAL UG/MG

## 2018-03-12 PROCEDURE — 82043 UR ALBUMIN QUANTITATIVE: CPT

## 2018-03-12 PROCEDURE — 99999 PR PBB SHADOW E&M-EST. PATIENT-LVL III: CPT | Mod: PBBFAC,,, | Performed by: FAMILY MEDICINE

## 2018-03-12 PROCEDURE — 99396 PREV VISIT EST AGE 40-64: CPT | Mod: S$GLB,,, | Performed by: FAMILY MEDICINE

## 2018-03-12 RX ORDER — METFORMIN HYDROCHLORIDE 500 MG/1
500 TABLET ORAL
Qty: 90 TABLET | Refills: 3 | Status: SHIPPED | OUTPATIENT
Start: 2018-03-12 | End: 2018-09-24 | Stop reason: SDUPTHER

## 2018-03-12 RX ORDER — AMLODIPINE BESYLATE AND BENAZEPRIL HYDROCHLORIDE 2.5; 1 MG/1; MG/1
1 CAPSULE ORAL DAILY
Qty: 90 CAPSULE | Refills: 3 | Status: SHIPPED | OUTPATIENT
Start: 2018-03-12 | End: 2018-09-24 | Stop reason: SDUPTHER

## 2018-03-12 RX ORDER — CELECOXIB 200 MG/1
400 CAPSULE ORAL DAILY
Qty: 180 CAPSULE | Refills: 2 | Status: SHIPPED | OUTPATIENT
Start: 2018-03-12 | End: 2018-09-24 | Stop reason: SDUPTHER

## 2018-03-12 NOTE — PROGRESS NOTES
Subjective:       Patient ID: May Montoya is a 62 y.o. female.    Chief Complaint: Follow-up    Annual exam:      Pt is a 62 year old DM with well controlled sugars. Has had her eye exam. Scheduled for mammogram. Discussed iFobt      Review of Systems   Constitutional: Negative.    Respiratory: Negative.    Cardiovascular: Negative.    Genitourinary: Negative.    Neurological: Negative.    Hematological: Negative.        Objective:      Physical Exam   Constitutional: She is oriented to person, place, and time. She appears well-developed and well-nourished.   HENT:   Head: Normocephalic.   Eyes: EOM are normal. Pupils are equal, round, and reactive to light.   Neck: Normal range of motion. No thyromegaly present.   Cardiovascular: Normal rate and regular rhythm.    No murmur heard.  Pulmonary/Chest: Effort normal and breath sounds normal.   Abdominal: Soft. Bowel sounds are normal.   Musculoskeletal: Normal range of motion.   Feet:   Right Foot:   Protective Sensation: 10 sites tested. 8 sites sensed.   Skin Integrity: Positive for callus and dry skin.   Left Foot:   Protective Sensation: 10 sites tested. 10 sites sensed.   Skin Integrity: Positive for callus and dry skin.   Neurological: She is alert and oriented to person, place, and time. She has normal reflexes.   Skin: Skin is warm.   Psychiatric: She has a normal mood and affect. Her behavior is normal.   Vitals reviewed.      Assessment:       1. Annual physical exam    2. Type 2 diabetes mellitus without complication, without long-term current use of insulin    3. Encounter for colorectal cancer screening    4. Hypertension associated with diabetes    5. Type 2 diabetes mellitus without complication, without long-term current use of insulin        Plan:       Annual physical exam  Comments:  Pt is generally healthy. Scheduled for mammogram  Orders:  -     CBC auto differential; Future; Expected date: 03/12/2018  -     Comprehensive metabolic panel;  Future; Expected date: 03/12/2018  -     Lipid panel; Future; Expected date: 03/12/2018    Type 2 diabetes mellitus without complication, without long-term current use of insulin  Comments:  Will get HgA1C and microalbumin  Orders:  -     Hemoglobin A1c; Future; Expected date: 03/12/2018  -     Microalbumin/creatinine urine ratio; Future; Expected date: 03/12/2018  -     metFORMIN (GLUCOPHAGE) 500 MG tablet; Take 1 tablet (500 mg total) by mouth daily with breakfast.  Dispense: 90 tablet; Refill: 3    Encounter for colorectal cancer screening  Comments:  Will do iFOBT  Orders:  -     Fecal Immunochemical Test (iFOBT); Future; Expected date: 03/12/2018    Hypertension associated with diabetes  Comments:  Pt BP is well controlled  Orders:  -     amlodipine-benazepril 2.5-10 mg (LOTREL) 2.5-10 mg per capsule; Take 1 capsule by mouth once daily.  Dispense: 90 capsule; Refill: 3    Type 2 diabetes mellitus without complication, without long-term current use of insulin  Comments:  Will continue on the metformin  Orders:  -     Hemoglobin A1c; Future; Expected date: 03/12/2018  -     Microalbumin/creatinine urine ratio; Future; Expected date: 03/12/2018  -     metFORMIN (GLUCOPHAGE) 500 MG tablet; Take 1 tablet (500 mg total) by mouth daily with breakfast.  Dispense: 90 tablet; Refill: 3    Other orders  -     celecoxib (CELEBREX) 200 MG capsule; Take 2 capsules (400 mg total) by mouth once daily.  Dispense: 180 capsule; Refill: 2

## 2018-03-15 DIAGNOSIS — E11.69 HYPERLIPIDEMIA ASSOCIATED WITH TYPE 2 DIABETES MELLITUS: Primary | Chronic | ICD-10-CM

## 2018-03-15 DIAGNOSIS — E78.5 HYPERLIPIDEMIA ASSOCIATED WITH TYPE 2 DIABETES MELLITUS: Primary | Chronic | ICD-10-CM

## 2018-03-15 RX ORDER — PRAVASTATIN SODIUM 20 MG/1
20 TABLET ORAL DAILY
Qty: 90 TABLET | Refills: 3 | Status: SHIPPED | OUTPATIENT
Start: 2018-03-15 | End: 2018-09-24 | Stop reason: SDUPTHER

## 2018-03-16 ENCOUNTER — TELEPHONE (OUTPATIENT)
Dept: INTERNAL MEDICINE | Facility: CLINIC | Age: 63
End: 2018-03-16

## 2018-03-16 NOTE — TELEPHONE ENCOUNTER
----- Message from Monica Melendrez sent at 3/16/2018  8:03 AM CDT -----  Contact: self   Patient returning call.Please call back at [phone].        Thanks,  Monica Melendrez

## 2018-03-27 ENCOUNTER — OFFICE VISIT (OUTPATIENT)
Dept: URGENT CARE | Facility: CLINIC | Age: 63
End: 2018-03-27
Payer: COMMERCIAL

## 2018-03-27 VITALS
HEART RATE: 120 BPM | RESPIRATION RATE: 20 BRPM | BODY MASS INDEX: 40.08 KG/M2 | OXYGEN SATURATION: 96 % | HEIGHT: 63 IN | SYSTOLIC BLOOD PRESSURE: 146 MMHG | TEMPERATURE: 98 F | WEIGHT: 226.19 LBS | DIASTOLIC BLOOD PRESSURE: 88 MMHG

## 2018-03-27 DIAGNOSIS — Z23 NEED FOR TETANUS BOOSTER: ICD-10-CM

## 2018-03-27 DIAGNOSIS — S81.812A LACERATION OF LEFT LOWER LEG, INITIAL ENCOUNTER: Primary | ICD-10-CM

## 2018-03-27 PROCEDURE — 99999 PR PBB SHADOW E&M-EST. PATIENT-LVL V: CPT | Mod: PBBFAC,,, | Performed by: NURSE PRACTITIONER

## 2018-03-27 PROCEDURE — 12002 RPR S/N/AX/GEN/TRNK2.6-7.5CM: CPT | Mod: S$GLB,,, | Performed by: NURSE PRACTITIONER

## 2018-03-27 PROCEDURE — 90471 IMMUNIZATION ADMIN: CPT | Mod: AT,S$GLB,, | Performed by: FAMILY MEDICINE

## 2018-03-27 PROCEDURE — 3077F SYST BP >= 140 MM HG: CPT | Mod: CPTII,S$GLB,, | Performed by: NURSE PRACTITIONER

## 2018-03-27 PROCEDURE — 90715 TDAP VACCINE 7 YRS/> IM: CPT | Mod: AT,S$GLB,, | Performed by: FAMILY MEDICINE

## 2018-03-27 PROCEDURE — 3079F DIAST BP 80-89 MM HG: CPT | Mod: CPTII,S$GLB,, | Performed by: NURSE PRACTITIONER

## 2018-03-27 PROCEDURE — 99214 OFFICE O/P EST MOD 30 MIN: CPT | Mod: SA,25,S$GLB, | Performed by: NURSE PRACTITIONER

## 2018-03-27 RX ORDER — CEPHALEXIN 500 MG/1
500 CAPSULE ORAL 4 TIMES DAILY
Qty: 40 CAPSULE | Refills: 0 | Status: SHIPPED | OUTPATIENT
Start: 2018-03-27 | End: 2018-04-06

## 2018-03-27 RX ORDER — MUPIROCIN 20 MG/G
OINTMENT TOPICAL 3 TIMES DAILY
Qty: 1 TUBE | Refills: 0 | Status: SHIPPED | OUTPATIENT
Start: 2018-03-27 | End: 2018-04-06

## 2018-03-27 RX ORDER — LIDOCAINE HYDROCHLORIDE 10 MG/ML
3 INJECTION, SOLUTION EPIDURAL; INFILTRATION; INTRACAUDAL; PERINEURAL
Status: COMPLETED | OUTPATIENT
Start: 2018-03-27 | End: 2018-03-27

## 2018-03-27 RX ADMIN — LIDOCAINE HYDROCHLORIDE 30 MG: 10 INJECTION, SOLUTION EPIDURAL; INFILTRATION; INTRACAUDAL; PERINEURAL at 03:03

## 2018-03-27 NOTE — PROGRESS NOTES
"Subjective:      Patient ID: May Montoya is a 62 y.o. female.    Chief Complaint: Laceration (draining serodangunous fluid )    Laceration    The incident occurred 1 to 3 hours ago. The laceration is located on the left leg. The laceration is 3 cm in size. The laceration mechanism was a metal edge (wagon edge). The pain is moderate. The pain has been constant since onset. She reports no foreign bodies present. Her tetanus status is unknown.     Review of Systems   Constitutional: Negative.    HENT: Negative.    Respiratory: Negative.    Cardiovascular: Positive for leg swelling (chronic).   Gastrointestinal: Negative.    Genitourinary: Negative.    Skin: Positive for wound.   Neurological: Negative.    Hematological: Negative.        Objective:   BP (!) 146/88 (BP Location: Right arm, Patient Position: Sitting, BP Method: Medium (Automatic))   Pulse (!) 120   Temp 98.2 °F (36.8 °C) (Tympanic)   Resp 20   Ht 5' 3" (1.6 m)   Wt 102.6 kg (226 lb 3.1 oz)   SpO2 96%   BMI 40.07 kg/m²   Physical Exam   Constitutional: She is oriented to person, place, and time. She appears well-developed and well-nourished. No distress.   Eyes: Conjunctivae are normal.   Neck: Normal range of motion. Neck supple.   Cardiovascular: Normal rate.    Pulmonary/Chest: Effort normal. No respiratory distress.   Neurological: She is alert and oriented to person, place, and time.   Skin: Skin is warm and dry. She is not diaphoretic.   Nursing note and vitals reviewed.    Assessment:      1. Laceration of left lower leg, initial encounter    2. Need for tetanus booster       Plan:   Laceration of left lower leg, initial encounter  -     (In Office Administered) Tdap Vaccine  -     mupirocin (BACTROBAN) 2 % ointment; Apply topically 3 (three) times daily.  Dispense: 1 Tube; Refill: 0  -     LACERATION REPAIR; Future  -     lidocaine (PF) 10 mg/ml (1%) injection 30 mg; Inject 3 mLs (30 mg total) into the skin one time.  -     cephALEXin " (KEFLEX) 500 MG capsule; Take 1 capsule (500 mg total) by mouth 4 (four) times daily.  Dispense: 40 capsule; Refill: 0    Need for tetanus booster  -     (In Office Administered) Tdap Vaccine    See procedure note for details of laceration repair.  Monitor for signs of infection and follow up immediately if any evidence of infection arises.  Instructions, follow up, and supportive care as per AVS.

## 2018-03-27 NOTE — PROCEDURES
"Laceration Repair  Date/Time: 3/27/2018 3:36 PM  Performed by: JUAN CRAFT  Authorized by: JUAN CRAFT   Consent Done: Yes  Consent: Verbal consent obtained.  Risks and benefits: risks, benefits and alternatives were discussed  Consent given by: patient  Patient identity confirmed:  and name  Time out: Immediately prior to procedure a "time out" was called to verify the correct patient, procedure, equipment, support staff and site/side marked as required.  Body area: lower extremity  Location details: left lower leg  Laceration length: 3 cm  Foreign bodies: no foreign bodies  Tendon involvement: none  Nerve involvement: none  Vascular damage: no  Anesthesia: local infiltration    Anesthesia:  Local Anesthetic: lidocaine 1% without epinephrine  Anesthetic total: 1 mL  Patient sedated: no  Preparation: Patient was prepped and draped in the usual sterile fashion.  Irrigation solution: saline  Irrigation method: syringe  Amount of cleaning: standard  Skin closure: 5-0 nylon  Technique: simple  Approximation: close  Approximation difficulty: simple  Dressing: antibiotic ointment and adhesive bandage  Patient tolerance: Patient tolerated the procedure well with no immediate complications        "

## 2018-03-27 NOTE — PATIENT INSTRUCTIONS
Extremity Laceration: Sutures, Staples, or Tape  A laceration is a cut through the skin. If it is deep, it may require stitches (sutures) or staples to close so it can heal. Minor cuts may be treated with surgical tape closures.   X-rays may be done if something may have entered the skin through the cut. You may also need a tetanus shot if you are not up to date on this vaccination.  Home care  · Follow the health care providers instructions on how to care for the cut.  · Wash your hands with soap and warm water before and after caring for your wound. This is to help prevent infection.  · Keep the wound clean and dry. If a bandage was applied and it becomes wet or dirty, replace it. Otherwise, leave it in place for the first 24 hours, then change it once a day or as directed.  · If sutures or staples were used, clean the wound daily:  · After removing the bandage, wash the area with soap and water. Use a wet cotton swab to loosen and remove any blood or crust that forms.  · After cleaning, keep the wound clean and dry. Talk with your doctor before applying any antibiotic ointment to the wound. Reapply the bandage.  · You may remove the bandage to shower as usual after the first 24 hours, but do not soak the area in water (no swimming) until the stitches or staples are removed.  · If surgical tape closures were used, keep the area clean and dry. If it becomes wet, blot it dry with a towel.  · The doctor may prescribe an antibiotic cream or ointment to prevent infection. Do not stop taking this medication until you have finished the prescribed course or the doctor tells you to stop. The doctor may also prescribe medications for pain. Follow the doctors instructions for taking these medications.  · Avoid activities that may reopen your wound.  Follow-up care  Follow up with your health care provider. Most skin wounds heal within ten days. However, an infection may sometimes occur despite proper treatment.  Therefore, check the wound daily for the signs of infection listed below. Stitches and staples should be removed within 7-14 days. If surgical tape closures were used, you may remove them after 10 days if they have not fallen off by then.   When to seek medical advice  Call your health care provider right away if any of these occur:  · Wound bleeding not controlled by direct pressure  · Signs of infection, including increasing pain in the wound, increasing wound redness or swelling, or pus or bad odor coming from the wound  · Fever of 100.4°F (38ºC) or higher or as directed by your healthcare provider  · Stitches or staples come apart or fall out or surgical tape falls off before 7 days  · Wound edges re-open  · Wound changes colors  · Numbness around the wound   · Decreased movement around the injured area  Date Last Reviewed: 6/14/2015  © 4612-6753 [employer] [address] [place] [address], [place] [address]. All rights reserved. This information is not intended as a substitute for professional medical care. Always follow your healthcare professional's instructions.

## 2018-04-10 ENCOUNTER — OFFICE VISIT (OUTPATIENT)
Dept: INTERNAL MEDICINE | Facility: CLINIC | Age: 63
End: 2018-04-10
Payer: COMMERCIAL

## 2018-04-10 ENCOUNTER — APPOINTMENT (OUTPATIENT)
Dept: LAB | Facility: HOSPITAL | Age: 63
End: 2018-04-10
Attending: FAMILY MEDICINE
Payer: COMMERCIAL

## 2018-04-10 VITALS
BODY MASS INDEX: 40.08 KG/M2 | HEART RATE: 80 BPM | DIASTOLIC BLOOD PRESSURE: 78 MMHG | WEIGHT: 226.19 LBS | HEIGHT: 63 IN | TEMPERATURE: 98 F | SYSTOLIC BLOOD PRESSURE: 136 MMHG

## 2018-04-10 DIAGNOSIS — S81.812A LACERATION OF LEFT LOWER LEG, INITIAL ENCOUNTER: Primary | ICD-10-CM

## 2018-04-10 DIAGNOSIS — I15.2 HYPERTENSION ASSOCIATED WITH DIABETES: Chronic | ICD-10-CM

## 2018-04-10 DIAGNOSIS — E11.59 HYPERTENSION ASSOCIATED WITH DIABETES: Chronic | ICD-10-CM

## 2018-04-10 PROCEDURE — 3075F SYST BP GE 130 - 139MM HG: CPT | Mod: CPTII,S$GLB,, | Performed by: FAMILY MEDICINE

## 2018-04-10 PROCEDURE — 3078F DIAST BP <80 MM HG: CPT | Mod: CPTII,S$GLB,, | Performed by: FAMILY MEDICINE

## 2018-04-10 PROCEDURE — 99213 OFFICE O/P EST LOW 20 MIN: CPT | Mod: S$GLB,,, | Performed by: FAMILY MEDICINE

## 2018-04-10 PROCEDURE — 99999 PR PBB SHADOW E&M-EST. PATIENT-LVL III: CPT | Mod: PBBFAC,,, | Performed by: FAMILY MEDICINE

## 2018-04-10 PROCEDURE — 3044F HG A1C LEVEL LT 7.0%: CPT | Mod: CPTII,S$GLB,, | Performed by: FAMILY MEDICINE

## 2018-04-10 NOTE — PROGRESS NOTES
Subjective:       Patient ID: May Montoya is a 62 y.o. female.    Chief Complaint: Suture / Staple Removal (left lower leg )    Pt is a 62 year old with 4 sutures on the left anterior leg. Pt was put antibotics and did well.       Suture / Staple Removal   The sutures were placed 11 to 14 days ago. She tried nothing since the wound repair. The treatment provided mild relief. Her temperature was unmeasured prior to arrival. There has been no drainage from the wound. There is no redness present. There is no swelling present. There is no pain present.     Review of Systems   Constitutional: Negative.    Respiratory: Negative.    Cardiovascular: Negative.    Genitourinary: Negative.    Musculoskeletal: Negative.    Neurological: Negative.    Hematological: Negative.        Objective:      Physical Exam   Constitutional: She is oriented to person, place, and time. She appears well-developed and well-nourished.   Cardiovascular: Normal rate and regular rhythm.    Pulmonary/Chest: Effort normal and breath sounds normal. No respiratory distress. She has no wheezes.   Neurological: She is alert and oriented to person, place, and time.   Skin:            Assessment:       1. Laceration of left lower leg, initial encounter    2. Hypertension associated with diabetes        Plan:       Laceration of left lower leg, initial encounter  Comments:  Pt did well with 4 sutures removed without incidnet.     Hypertension associated with diabetes  Comments:  Pt BP is well controlled

## 2018-04-13 ENCOUNTER — TELEPHONE (OUTPATIENT)
Dept: INTERNAL MEDICINE | Facility: CLINIC | Age: 63
End: 2018-04-13

## 2018-04-13 NOTE — TELEPHONE ENCOUNTER
----- Message from Lorena Guevara sent at 4/13/2018  8:10 AM CDT -----  Contact: Pt  Pt is calling in regards to a missed call and can be reached at [phone]

## 2018-04-16 ENCOUNTER — TELEPHONE (OUTPATIENT)
Dept: INTERNAL MEDICINE | Facility: CLINIC | Age: 63
End: 2018-04-16

## 2018-08-29 ENCOUNTER — PATIENT OUTREACH (OUTPATIENT)
Dept: ADMINISTRATIVE | Facility: HOSPITAL | Age: 63
End: 2018-08-29

## 2018-08-29 NOTE — LETTER
August 29, 2018        May Montoya  [address]  [place] [address]      Dear Mrs. Montoya,    You have an upcoming appointment with Moreno Martinez MD on 9/12/18 @ 9:40 am.      Your chart is indicating you may be due for the following and I will be happy to assist you in scheduling any needed appointments:  Health Maintenance Due   Topic    DEXA SCAN     Zoster Vaccine     Mammogram     Influenza Vaccine     Pneumococcal (65+) (1 of 2 - PCV13)     If you have had any of the above done at another facility, please bring the records or information with you so that your record at [facility] will be complete.    We will be happy to assist you with scheduling any necessary appointments or you may contact the [facility] appointment desk at [phone] to schedule at your convenience.     Thank you for choosing [facility] for your healthcare needs,      CLARENCE Pickett  Care Coordination Department  [facility]  [phone]

## 2018-09-17 ENCOUNTER — LAB VISIT (OUTPATIENT)
Dept: LAB | Facility: HOSPITAL | Age: 63
End: 2018-09-17
Attending: FAMILY MEDICINE
Payer: COMMERCIAL

## 2018-09-17 DIAGNOSIS — E11.69 HYPERLIPIDEMIA ASSOCIATED WITH TYPE 2 DIABETES MELLITUS: Chronic | ICD-10-CM

## 2018-09-17 DIAGNOSIS — E78.5 HYPERLIPIDEMIA ASSOCIATED WITH TYPE 2 DIABETES MELLITUS: Chronic | ICD-10-CM

## 2018-09-17 LAB
CHOLEST SERPL-MCNC: 238 MG/DL
CHOLEST/HDLC SERPL: 4.9 {RATIO}
ESTIMATED AVG GLUCOSE: 148 MG/DL
HBA1C MFR BLD HPLC: 6.8 %
HDLC SERPL-MCNC: 49 MG/DL
HDLC SERPL: 20.6 %
LDLC SERPL CALC-MCNC: 153 MG/DL
NONHDLC SERPL-MCNC: 189 MG/DL
TRIGL SERPL-MCNC: 180 MG/DL

## 2018-09-17 PROCEDURE — 80061 LIPID PANEL: CPT

## 2018-09-17 PROCEDURE — 36415 COLL VENOUS BLD VENIPUNCTURE: CPT | Mod: PO

## 2018-09-17 PROCEDURE — 83036 HEMOGLOBIN GLYCOSYLATED A1C: CPT

## 2018-09-24 ENCOUNTER — OFFICE VISIT (OUTPATIENT)
Dept: INTERNAL MEDICINE | Facility: CLINIC | Age: 63
End: 2018-09-24
Payer: COMMERCIAL

## 2018-09-24 VITALS
HEART RATE: 103 BPM | BODY MASS INDEX: 40.23 KG/M2 | HEIGHT: 63 IN | WEIGHT: 227.06 LBS | DIASTOLIC BLOOD PRESSURE: 78 MMHG | TEMPERATURE: 98 F | SYSTOLIC BLOOD PRESSURE: 116 MMHG

## 2018-09-24 DIAGNOSIS — E78.5 HYPERLIPIDEMIA ASSOCIATED WITH TYPE 2 DIABETES MELLITUS: Primary | Chronic | ICD-10-CM

## 2018-09-24 DIAGNOSIS — E11.9 TYPE 2 DIABETES MELLITUS WITHOUT COMPLICATION, WITHOUT LONG-TERM CURRENT USE OF INSULIN: Chronic | ICD-10-CM

## 2018-09-24 DIAGNOSIS — Z23 IMMUNIZATION DUE: ICD-10-CM

## 2018-09-24 DIAGNOSIS — E11.69 HYPERLIPIDEMIA ASSOCIATED WITH TYPE 2 DIABETES MELLITUS: Primary | Chronic | ICD-10-CM

## 2018-09-24 DIAGNOSIS — I15.2 HYPERTENSION ASSOCIATED WITH DIABETES: Chronic | ICD-10-CM

## 2018-09-24 DIAGNOSIS — E11.59 HYPERTENSION ASSOCIATED WITH DIABETES: Chronic | ICD-10-CM

## 2018-09-24 PROCEDURE — 1101F PT FALLS ASSESS-DOCD LE1/YR: CPT | Mod: CPTII,S$GLB,, | Performed by: FAMILY MEDICINE

## 2018-09-24 PROCEDURE — 90670 PCV13 VACCINE IM: CPT | Mod: S$GLB,,, | Performed by: FAMILY MEDICINE

## 2018-09-24 PROCEDURE — 3074F SYST BP LT 130 MM HG: CPT | Mod: CPTII,S$GLB,, | Performed by: FAMILY MEDICINE

## 2018-09-24 PROCEDURE — 3078F DIAST BP <80 MM HG: CPT | Mod: CPTII,S$GLB,, | Performed by: FAMILY MEDICINE

## 2018-09-24 PROCEDURE — 99214 OFFICE O/P EST MOD 30 MIN: CPT | Mod: 25,S$GLB,, | Performed by: FAMILY MEDICINE

## 2018-09-24 PROCEDURE — 3008F BODY MASS INDEX DOCD: CPT | Mod: CPTII,S$GLB,, | Performed by: FAMILY MEDICINE

## 2018-09-24 PROCEDURE — 3044F HG A1C LEVEL LT 7.0%: CPT | Mod: CPTII,S$GLB,, | Performed by: FAMILY MEDICINE

## 2018-09-24 PROCEDURE — 90471 IMMUNIZATION ADMIN: CPT | Mod: S$GLB,,, | Performed by: FAMILY MEDICINE

## 2018-09-24 PROCEDURE — 99999 PR PBB SHADOW E&M-EST. PATIENT-LVL III: CPT | Mod: PBBFAC,,, | Performed by: FAMILY MEDICINE

## 2018-09-24 RX ORDER — CELECOXIB 200 MG/1
400 CAPSULE ORAL DAILY
Qty: 180 CAPSULE | Refills: 2 | Status: SHIPPED | OUTPATIENT
Start: 2018-09-24 | End: 2018-12-21 | Stop reason: SDUPTHER

## 2018-09-24 RX ORDER — BETAMETHASONE VALERATE 0.1 %
LOTION (ML) TOPICAL 2 TIMES DAILY
Qty: 60 ML | Refills: 1 | Status: SHIPPED | OUTPATIENT
Start: 2018-09-24 | End: 2021-06-28

## 2018-09-24 RX ORDER — PRAVASTATIN SODIUM 20 MG/1
20 TABLET ORAL DAILY
Qty: 90 TABLET | Refills: 3 | Status: SHIPPED | OUTPATIENT
Start: 2018-09-24 | End: 2019-02-19 | Stop reason: SDUPTHER

## 2018-09-24 RX ORDER — METFORMIN HYDROCHLORIDE 500 MG/1
500 TABLET ORAL
Qty: 90 TABLET | Refills: 3 | Status: SHIPPED | OUTPATIENT
Start: 2018-09-24 | End: 2019-03-21 | Stop reason: SDUPTHER

## 2018-09-24 RX ORDER — AMLODIPINE BESYLATE AND BENAZEPRIL HYDROCHLORIDE 2.5; 1 MG/1; MG/1
1 CAPSULE ORAL DAILY
Qty: 90 CAPSULE | Refills: 3 | Status: SHIPPED | OUTPATIENT
Start: 2018-09-24 | End: 2019-03-21 | Stop reason: SDUPTHER

## 2018-09-24 NOTE — PROGRESS NOTES
Subjective:       Patient ID: May Montoya is a 63 y.o. female.    Chief Complaint: Follow-up    F/U:      Pt is a 63 year old who is here for 6 month follow-up. Pt has chronic issues with HTN, arthritis, hypercholesterol and Type 2 DM. Review of labs suggest well controlled DM. Pt LDL is still elevated since on pravachol 20 mg but improved. She is up-to-date with wellness but does need her pneumo 13.          Review of Systems   Constitutional: Negative.  Negative for activity change, chills, fatigue and fever.   HENT: Negative.  Negative for congestion, ear pain, postnasal drip, rhinorrhea, sinus pressure, sore throat and trouble swallowing.    Eyes: Negative for visual disturbance.   Respiratory: Negative.  Negative for cough, chest tightness, shortness of breath and wheezing.    Gastrointestinal: Negative.    Endocrine: Negative.  Negative for cold intolerance and heat intolerance.   Genitourinary: Negative.  Negative for dysuria, hematuria, urgency, vaginal bleeding and vaginal discharge.   Musculoskeletal: Negative for arthralgias, back pain, joint swelling and neck stiffness.   Skin: Negative for color change and rash.   Neurological: Negative.  Negative for dizziness, tremors, seizures, syncope, weakness, light-headedness and headaches.   Hematological: Negative.    Psychiatric/Behavioral: Negative.  Negative for agitation, confusion, sleep disturbance and suicidal ideas. The patient is not nervous/anxious.        Objective:      Physical Exam   Constitutional: She is oriented to person, place, and time. She appears well-developed and well-nourished.   Cardiovascular: Normal rate and regular rhythm. Exam reveals no friction rub.   No murmur heard.  Pulmonary/Chest: Effort normal and breath sounds normal. She has no wheezes. She has no rales.   Musculoskeletal: Normal range of motion.   Neurological: She is alert and oriented to person, place, and time.   Skin: Skin is warm and dry.       Assessment:        1. Hyperlipidemia associated with type 2 diabetes mellitus    2. Type 2 diabetes mellitus without complication, without long-term current use of insulin    3. Hypertension associated with diabetes    4. Immunization due        Plan:       Hyperlipidemia associated with type 2 diabetes mellitus  Comments:  Will stay on pravachol 20 and do lifestyle    Type 2 diabetes mellitus without complication, without long-term current use of insulin  Comments:  Will continue on the metformin  Orders:  -     metFORMIN (GLUCOPHAGE) 500 MG tablet; Take 1 tablet (500 mg total) by mouth daily with breakfast.  Dispense: 90 tablet; Refill: 3    Hypertension associated with diabetes  Comments:  Pt BP is well controlled  Orders:  -     amlodipine-benazepril 2.5-10 mg (LOTREL) 2.5-10 mg per capsule; Take 1 capsule by mouth once daily.  Dispense: 90 capsule; Refill: 3    Immunization due  Comments:  Will do pneumo 13  Orders:  -     (In Office Administered) Pneumococcal Conjugate Vaccine (13 Valent) (IM)    Other orders  -     betamethasone valerate 0.1% (VALISONE) 0.1 % Lotn; Apply topically 2 (two) times daily. for 10 days  Dispense: 60 mL; Refill: 1  -     pravastatin (PRAVACHOL) 20 MG tablet; Take 1 tablet (20 mg total) by mouth once daily.  Dispense: 90 tablet; Refill: 3  -     celecoxib (CELEBREX) 200 MG capsule; Take 2 capsules (400 mg total) by mouth once daily.  Dispense: 180 capsule; Refill: 2

## 2018-11-29 ENCOUNTER — NURSE TRIAGE (OUTPATIENT)
Dept: ADMINISTRATIVE | Facility: CLINIC | Age: 63
End: 2018-11-29

## 2018-11-29 ENCOUNTER — OFFICE VISIT (OUTPATIENT)
Dept: URGENT CARE | Facility: CLINIC | Age: 63
End: 2018-11-29
Payer: COMMERCIAL

## 2018-11-29 VITALS
WEIGHT: 225 LBS | OXYGEN SATURATION: 97 % | DIASTOLIC BLOOD PRESSURE: 70 MMHG | SYSTOLIC BLOOD PRESSURE: 108 MMHG | HEIGHT: 63 IN | RESPIRATION RATE: 18 BRPM | TEMPERATURE: 98 F | BODY MASS INDEX: 39.87 KG/M2 | HEART RATE: 98 BPM

## 2018-11-29 DIAGNOSIS — B02.8 HERPES ZOSTER WITH COMPLICATION: Primary | ICD-10-CM

## 2018-11-29 PROCEDURE — 3008F BODY MASS INDEX DOCD: CPT | Mod: CPTII,S$GLB,, | Performed by: NURSE PRACTITIONER

## 2018-11-29 PROCEDURE — 99999 PR PBB SHADOW E&M-EST. PATIENT-LVL IV: CPT | Mod: PBBFAC,,, | Performed by: NURSE PRACTITIONER

## 2018-11-29 PROCEDURE — 3074F SYST BP LT 130 MM HG: CPT | Mod: CPTII,S$GLB,, | Performed by: NURSE PRACTITIONER

## 2018-11-29 PROCEDURE — 99214 OFFICE O/P EST MOD 30 MIN: CPT | Mod: S$GLB,,, | Performed by: NURSE PRACTITIONER

## 2018-11-29 PROCEDURE — 3078F DIAST BP <80 MM HG: CPT | Mod: CPTII,S$GLB,, | Performed by: NURSE PRACTITIONER

## 2018-11-29 PROCEDURE — 1101F PT FALLS ASSESS-DOCD LE1/YR: CPT | Mod: CPTII,S$GLB,, | Performed by: NURSE PRACTITIONER

## 2018-11-29 RX ORDER — VALACYCLOVIR HYDROCHLORIDE 1 G/1
1000 TABLET, FILM COATED ORAL 3 TIMES DAILY
Qty: 21 TABLET | Refills: 0 | Status: SHIPPED | OUTPATIENT
Start: 2018-11-29 | End: 2021-03-29

## 2018-11-30 NOTE — PROGRESS NOTES
Subjective:       Patient ID: May Montoya is a 63 y.o. female.    Chief Complaint: Rash    Pt is a 63 year old female to clinic today with complaints of a painful blistering rash that began Monday.       Rash   This is a new problem. The current episode started in the past 7 days. The problem has been gradually worsening since onset. Location: left hip. The rash is characterized by blistering and redness. She was exposed to nothing. Pertinent negatives include no anorexia, congestion, cough, diarrhea, eye pain, facial edema, fatigue, fever, joint pain, nail changes, rhinorrhea, shortness of breath, sore throat or vomiting. Past treatments include nothing. The treatment provided no relief. Her past medical history is significant for varicella.     Review of Systems   Constitutional: Negative for chills, diaphoresis, fatigue and fever.   HENT: Negative for congestion, rhinorrhea and sore throat.    Eyes: Negative for pain.   Respiratory: Negative for cough and shortness of breath.    Gastrointestinal: Negative for anorexia, diarrhea and vomiting.   Musculoskeletal: Negative for joint pain.   Skin: Positive for rash. Negative for nail changes.   Neurological: Negative for dizziness, light-headedness and headaches.       Objective:      Physical Exam   Constitutional: She appears well-developed and well-nourished. No distress.   HENT:   Head: Normocephalic.   Right Ear: External ear normal.   Left Ear: External ear normal.   Nose: Nose normal.   Eyes: Pupils are equal, round, and reactive to light.   Skin: Skin is warm and dry. Rash noted. Rash is vesicular. She is not diaphoretic.   Psychiatric: She has a normal mood and affect. Her speech is normal and behavior is normal. Thought content normal.   Nursing note and vitals reviewed.      Assessment:       1. Herpes zoster with complication        Plan:   Herpes zoster with complication  -     valACYclovir (VALTREX) 1000 MG tablet; Take 1 tablet (1,000 mg total) by  mouth 3 (three) times daily. for 7 days  Dispense: 21 tablet; Refill: 0      Follow prescribed treatment plan as directed.  Stay hydrated and rest.  Report to ER if symptoms worsen.  Follow up with PCP in 2-3 days or sooner if symptoms do not improve.

## 2018-11-30 NOTE — TELEPHONE ENCOUNTER
"    Reason for Disposition   Health Information question, no triage required and triager able to answer question    Answer Assessment - Initial Assessment Questions  1. REASON FOR CALL or QUESTION: "What is your reason for calling today?" or "How can I best help you?" or "What question do you have that I can help answer?"      Pt dx with shingles today and told she was contagious. She has crusted rash on hip area. Wants to know how long she is contagious.    Protocols used: ST INFORMATION ONLY CALL-A-AH      "

## 2018-11-30 NOTE — PATIENT INSTRUCTIONS

## 2018-12-21 RX ORDER — CELECOXIB 200 MG/1
400 CAPSULE ORAL DAILY
Qty: 180 CAPSULE | Refills: 0 | Status: SHIPPED | OUTPATIENT
Start: 2018-12-21 | End: 2019-03-21 | Stop reason: SDUPTHER

## 2018-12-21 NOTE — TELEPHONE ENCOUNTER
----- Message from Agustin Sheikh sent at 12/21/2018  8:06 AM CST -----  Contact: May [phone]  1. What is the name of the medication you are requesting? Celebrex  2. What is the dose? 400 mg  3. How do you take the medication? Orally, topically, etc? Oral  4. How often do you take this medication? 2 daily  5. Do you need a 30 day or 90 day supply? 30  6. How many refills are you requesting? 1  7. What is your preferred pharmacy and location of the pharmacy? [facility]  8. Who can we contact with further questions? pt

## 2019-02-19 NOTE — TELEPHONE ENCOUNTER
----- Message from Megan Rosales sent at 2/19/2019  8:02 AM CST -----  Contact: self [phone]  Type:  RX Refill Request    Who Called: May Montoya  Refill or New Rx:refill  RX Name and Strength:Pravastatin 20mg  How is the patient currently taking it? (ex. 1XDay):1x day  Is this a 30 day or 90 day RX: 30 day  Preferred Pharmacy with phone number:    [facility] [address] [place], [place] - [address] [facility]  [address] [facility] [address]  Phone: [phone] Fax: [phone]    Local or Mail Order: local  Ordering Provider:Juan  Would the patient rather a call back or a response via [facility]? Call back  Best Call Back Number:[phone]  Additional Information:  Amlodipine 10mg 1x day, Metformin 500mg 1x day, Celebrex 200mg 2x daily

## 2019-02-20 RX ORDER — PRAVASTATIN SODIUM 20 MG/1
20 TABLET ORAL DAILY
Qty: 90 TABLET | Refills: 3 | Status: SHIPPED | OUTPATIENT
Start: 2019-02-20 | End: 2019-03-21 | Stop reason: SDUPTHER

## 2019-03-11 ENCOUNTER — OFFICE VISIT (OUTPATIENT)
Dept: OPHTHALMOLOGY | Facility: CLINIC | Age: 64
End: 2019-03-11
Payer: COMMERCIAL

## 2019-03-11 DIAGNOSIS — H25.13 CATARACT, NUCLEAR SCLEROTIC SENILE, BILATERAL: ICD-10-CM

## 2019-03-11 DIAGNOSIS — Z13.5 GLAUCOMA SCREENING: ICD-10-CM

## 2019-03-11 DIAGNOSIS — H52.4 PRESBYOPIA: ICD-10-CM

## 2019-03-11 DIAGNOSIS — E11.9 DIABETES MELLITUS TYPE 2 WITHOUT RETINOPATHY: Primary | ICD-10-CM

## 2019-03-11 DIAGNOSIS — H52.03 HYPERMETROPIA OF BOTH EYES: ICD-10-CM

## 2019-03-11 PROCEDURE — 92015 PR REFRACTION: ICD-10-PCS | Mod: S$GLB,,, | Performed by: OPTOMETRIST

## 2019-03-11 PROCEDURE — 99212 OFFICE O/P EST SF 10 MIN: CPT | Mod: PBBFAC,PN | Performed by: OPTOMETRIST

## 2019-03-11 PROCEDURE — 92014 PR EYE EXAM, EST PATIENT,COMPREHESV: ICD-10-PCS | Mod: S$GLB,,, | Performed by: OPTOMETRIST

## 2019-03-11 PROCEDURE — 99999 PR PBB SHADOW E&M-EST. PATIENT-LVL II: ICD-10-PCS | Mod: PBBFAC,,, | Performed by: OPTOMETRIST

## 2019-03-11 PROCEDURE — 99999 PR PBB SHADOW E&M-EST. PATIENT-LVL II: CPT | Mod: PBBFAC,,, | Performed by: OPTOMETRIST

## 2019-03-11 PROCEDURE — 92014 COMPRE OPH EXAM EST PT 1/>: CPT | Mod: S$GLB,,, | Performed by: OPTOMETRIST

## 2019-03-11 PROCEDURE — 92015 DETERMINE REFRACTIVE STATE: CPT | Mod: S$GLB,,, | Performed by: OPTOMETRIST

## 2019-03-11 NOTE — PROGRESS NOTES
HPI     Last [facility] exam 03/06/2018  Diabetic/NIDDM  Cataract, nuclear, bilateral   Screening for glaucoma  RE  No visual complaints     Last edited by Frederick Drew MA on 3/11/2019  9:07 AM. (History)            Assessment /Plan     For exam results, see Encounter Report.    Diabetes mellitus type 2 without retinopathy    Cataract, nuclear sclerotic senile, bilateral    Glaucoma screening    Hypermetropia of both eyes    Presbyopia      No diabetic retinopathy OU.  Mild NS cataracts OU = will follow.  OH OK OU otherwise.  Spec Rx given.  RTC one year.

## 2019-03-12 ENCOUNTER — PATIENT OUTREACH (OUTPATIENT)
Dept: ADMINISTRATIVE | Facility: HOSPITAL | Age: 64
End: 2019-03-12

## 2019-03-12 DIAGNOSIS — E11.9 TYPE 2 DIABETES MELLITUS WITHOUT COMPLICATION, WITHOUT LONG-TERM CURRENT USE OF INSULIN: Primary | Chronic | ICD-10-CM

## 2019-03-21 DIAGNOSIS — I15.2 HYPERTENSION ASSOCIATED WITH DIABETES: Chronic | ICD-10-CM

## 2019-03-21 DIAGNOSIS — E11.59 HYPERTENSION ASSOCIATED WITH DIABETES: Chronic | ICD-10-CM

## 2019-03-21 DIAGNOSIS — E11.9 TYPE 2 DIABETES MELLITUS WITHOUT COMPLICATION, WITHOUT LONG-TERM CURRENT USE OF INSULIN: Chronic | ICD-10-CM

## 2019-03-22 RX ORDER — CELECOXIB 200 MG/1
400 CAPSULE ORAL DAILY
Qty: 180 CAPSULE | Refills: 0 | Status: SHIPPED | OUTPATIENT
Start: 2019-03-22 | End: 2019-07-04 | Stop reason: SDUPTHER

## 2019-03-22 RX ORDER — METFORMIN HYDROCHLORIDE 500 MG/1
500 TABLET ORAL
Qty: 90 TABLET | Refills: 3 | Status: SHIPPED | OUTPATIENT
Start: 2019-03-22 | End: 2020-04-01 | Stop reason: SDUPTHER

## 2019-03-22 RX ORDER — PRAVASTATIN SODIUM 20 MG/1
20 TABLET ORAL DAILY
Qty: 90 TABLET | Refills: 3 | Status: SHIPPED | OUTPATIENT
Start: 2019-03-22 | End: 2019-04-05

## 2019-03-22 RX ORDER — AMLODIPINE BESYLATE AND BENAZEPRIL HYDROCHLORIDE 2.5; 1 MG/1; MG/1
1 CAPSULE ORAL DAILY
Qty: 90 CAPSULE | Refills: 3 | Status: SHIPPED | OUTPATIENT
Start: 2019-03-22 | End: 2020-04-01 | Stop reason: SDUPTHER

## 2019-03-29 ENCOUNTER — PATIENT OUTREACH (OUTPATIENT)
Dept: ADMINISTRATIVE | Facility: HOSPITAL | Age: 64
End: 2019-03-29

## 2019-04-01 ENCOUNTER — OFFICE VISIT (OUTPATIENT)
Dept: INTERNAL MEDICINE | Facility: CLINIC | Age: 64
End: 2019-04-01
Payer: COMMERCIAL

## 2019-04-01 VITALS
TEMPERATURE: 98 F | HEIGHT: 63 IN | OXYGEN SATURATION: 93 % | SYSTOLIC BLOOD PRESSURE: 126 MMHG | WEIGHT: 218.94 LBS | DIASTOLIC BLOOD PRESSURE: 82 MMHG | HEART RATE: 106 BPM | BODY MASS INDEX: 38.79 KG/M2

## 2019-04-01 DIAGNOSIS — E78.5 HYPERLIPIDEMIA ASSOCIATED WITH TYPE 2 DIABETES MELLITUS: Chronic | ICD-10-CM

## 2019-04-01 DIAGNOSIS — E11.69 HYPERLIPIDEMIA ASSOCIATED WITH TYPE 2 DIABETES MELLITUS: Chronic | ICD-10-CM

## 2019-04-01 DIAGNOSIS — E11.9 TYPE 2 DIABETES MELLITUS WITHOUT COMPLICATION, WITHOUT LONG-TERM CURRENT USE OF INSULIN: Primary | Chronic | ICD-10-CM

## 2019-04-01 DIAGNOSIS — E11.59 HYPERTENSION ASSOCIATED WITH DIABETES: Chronic | ICD-10-CM

## 2019-04-01 DIAGNOSIS — I15.2 HYPERTENSION ASSOCIATED WITH DIABETES: Chronic | ICD-10-CM

## 2019-04-01 PROCEDURE — 99999 PR PBB SHADOW E&M-EST. PATIENT-LVL III: CPT | Mod: PBBFAC,,, | Performed by: FAMILY MEDICINE

## 2019-04-01 PROCEDURE — 99214 OFFICE O/P EST MOD 30 MIN: CPT | Mod: S$GLB,,, | Performed by: FAMILY MEDICINE

## 2019-04-01 PROCEDURE — 99213 OFFICE O/P EST LOW 20 MIN: CPT | Mod: PBBFAC,PN | Performed by: FAMILY MEDICINE

## 2019-04-01 PROCEDURE — 99999 PR PBB SHADOW E&M-EST. PATIENT-LVL III: ICD-10-PCS | Mod: PBBFAC,,, | Performed by: FAMILY MEDICINE

## 2019-04-01 PROCEDURE — 99214 PR OFFICE/OUTPT VISIT, EST, LEVL IV, 30-39 MIN: ICD-10-PCS | Mod: S$GLB,,, | Performed by: FAMILY MEDICINE

## 2019-04-01 NOTE — PROGRESS NOTES
Subjective:       Patient ID: May Montoya is a 63 y.o. female.    Chief Complaint: Follow-up    F/U:      Pt is 63 year old with HTN and cholesterol issue. Pt has DM that has been well controlled. Pt had flu last week.    Review of Systems   Constitutional: Negative.    Respiratory: Negative.    Cardiovascular: Negative.    Genitourinary: Negative.    Neurological: Negative.    Hematological: Negative.        Objective:      Physical Exam   Constitutional: She is oriented to person, place, and time. She appears well-developed and well-nourished.   Cardiovascular: Normal rate and regular rhythm. Exam reveals no friction rub.   No murmur heard.  Pulmonary/Chest: Effort normal. Stridor present. She has wheezes.   Abdominal: Soft. Bowel sounds are normal. There is no tenderness. There is no guarding.   Musculoskeletal: Normal range of motion.   Neurological: She is alert and oriented to person, place, and time.   Skin: Skin is warm and dry.   Psychiatric: She has a normal mood and affect. Her behavior is normal.       Assessment:       1. Type 2 diabetes mellitus without complication, without long-term current use of insulin    2. Hyperlipidemia associated with type 2 diabetes mellitus    3. Hypertension associated with diabetes        Plan:       Type 2 diabetes mellitus without complication, without long-term current use of insulin  -     Microalbumin/creatinine urine ratio; Future; Expected date: 04/01/2019  -     Hemoglobin A1c; Future; Expected date: 04/01/2019    Hyperlipidemia associated with type 2 diabetes mellitus  -     Lipid panel; Future; Expected date: 04/01/2019    Hypertension associated with diabetes  -     Comprehensive metabolic panel; Future; Expected date: 04/01/2019

## 2019-04-10 ENCOUNTER — TELEPHONE (OUTPATIENT)
Dept: INTERNAL MEDICINE | Facility: CLINIC | Age: 64
End: 2019-04-10

## 2019-04-10 NOTE — TELEPHONE ENCOUNTER
Please see lab results 4/1/19: Result and recommendation conveyed to patient. Appt scheduled with patient as per below:    Future Appointments   Date Time Provider Department Center   7/8/2019  9:00 AM MD [facility]   10/1/2019 10:00 AM MD [facility]     Patient verbalized understanding, will begin taking 2 of the 20 mg tab of pravastatin and will request new rx for 40 mg once rx due for refill as per Dr. Martinez's instructions. Patient verbalized understanding of appointment date, time, and clinic location. Patient thanked me and ended call.

## 2019-04-10 NOTE — TELEPHONE ENCOUNTER
----- Message from Anne Jain sent at 4/10/2019  8:07 AM CDT -----  Contact: self/[phone]  Type:  Patient Returning Call    Who Called:May Montoya    Who Left Message for Patient:Tom  Does the patient know what this is regarding?:results  Would the patient rather a call back or a response via [facility]? Call back   Best Call Back Number:[phone]  Additional Information:

## 2019-04-16 ENCOUNTER — TELEPHONE (OUTPATIENT)
Dept: INTERNAL MEDICINE | Facility: CLINIC | Age: 64
End: 2019-04-16

## 2019-04-16 RX ORDER — PRAVASTATIN SODIUM 40 MG/1
40 TABLET ORAL DAILY
COMMUNITY
End: 2019-04-16 | Stop reason: SDUPTHER

## 2019-04-16 NOTE — TELEPHONE ENCOUNTER
----- Message from Constantin aGston sent at 4/16/2019 11:54 AM CDT -----  Contact: [phone]  Pt Returning call, adolfo call back at [phone].  Thx-

## 2019-04-16 NOTE — TELEPHONE ENCOUNTER
----- Message from Antonia Denise sent at 4/16/2019  1:52 PM CDT -----  Contact: [phone]  Would like to consult with the nurse, patient is returning the nurse call, please call back at [phone], thanks sj  .Type:  Patient Returning Call    Who Called ms daugherty  Who Left Message for Patient:veronica  Does the patient know what this is regarding?:no  Would the patient rather a call back or a response via [facility]? Call back  Best Call Back Number:[phone]  Additional Information:

## 2019-04-16 NOTE — TELEPHONE ENCOUNTER
----- Message from Juliana Metzger sent at 4/16/2019 10:48 AM CDT -----  ..Type:  Patient Returning Call    Who Called: pt   Who Left Message for Patient:  Does the patient know what this is regarding?: prescription change   Would the patient rather a call back or a response via [facility]? Call back   Best Call Back Number:[phone]  Additional Information: pt is requesting a call from nurse to f/u on prostration prescription

## 2019-04-17 RX ORDER — PRAVASTATIN SODIUM 40 MG/1
40 TABLET ORAL DAILY
Qty: 30 TABLET | Refills: 3 | Status: SHIPPED | OUTPATIENT
Start: 2019-04-17 | End: 2019-08-21 | Stop reason: SDUPTHER

## 2019-07-07 RX ORDER — CELECOXIB 200 MG/1
CAPSULE ORAL
Qty: 180 CAPSULE | Refills: 0 | Status: SHIPPED | OUTPATIENT
Start: 2019-07-07 | End: 2019-10-21 | Stop reason: SDUPTHER

## 2019-07-08 ENCOUNTER — PATIENT OUTREACH (OUTPATIENT)
Dept: ADMINISTRATIVE | Facility: HOSPITAL | Age: 64
End: 2019-07-08

## 2019-07-15 ENCOUNTER — LAB VISIT (OUTPATIENT)
Dept: LAB | Facility: HOSPITAL | Age: 64
End: 2019-07-15
Attending: FAMILY MEDICINE
Payer: MEDICARE

## 2019-07-15 ENCOUNTER — OFFICE VISIT (OUTPATIENT)
Dept: INTERNAL MEDICINE | Facility: CLINIC | Age: 64
End: 2019-07-15
Payer: MEDICARE

## 2019-07-15 VITALS
BODY MASS INDEX: 39.4 KG/M2 | TEMPERATURE: 97 F | HEART RATE: 83 BPM | DIASTOLIC BLOOD PRESSURE: 70 MMHG | SYSTOLIC BLOOD PRESSURE: 116 MMHG | WEIGHT: 222.44 LBS | RESPIRATION RATE: 16 BRPM

## 2019-07-15 DIAGNOSIS — S16.1XXA ACUTE STRAIN OF NECK MUSCLE, INITIAL ENCOUNTER: ICD-10-CM

## 2019-07-15 DIAGNOSIS — E11.59 HYPERTENSION ASSOCIATED WITH DIABETES: Primary | Chronic | ICD-10-CM

## 2019-07-15 DIAGNOSIS — E11.9 TYPE 2 DIABETES MELLITUS WITHOUT COMPLICATION, WITHOUT LONG-TERM CURRENT USE OF INSULIN: Chronic | ICD-10-CM

## 2019-07-15 DIAGNOSIS — I15.2 HYPERTENSION ASSOCIATED WITH DIABETES: Primary | Chronic | ICD-10-CM

## 2019-07-15 DIAGNOSIS — Z12.12 ENCOUNTER FOR COLORECTAL CANCER SCREENING: ICD-10-CM

## 2019-07-15 DIAGNOSIS — Z12.11 ENCOUNTER FOR COLORECTAL CANCER SCREENING: ICD-10-CM

## 2019-07-15 LAB
ESTIMATED AVG GLUCOSE: 160 MG/DL (ref 68–131)
HBA1C MFR BLD HPLC: 7.2 % (ref 4–5.6)

## 2019-07-15 PROCEDURE — 36415 COLL VENOUS BLD VENIPUNCTURE: CPT

## 2019-07-15 PROCEDURE — 99214 OFFICE O/P EST MOD 30 MIN: CPT | Mod: S$GLB,,, | Performed by: FAMILY MEDICINE

## 2019-07-15 PROCEDURE — 3045F PR MOST RECENT HEMOGLOBIN A1C LEVEL 7.0-9.0%: ICD-10-PCS | Mod: CPTII,S$GLB,, | Performed by: FAMILY MEDICINE

## 2019-07-15 PROCEDURE — 99999 PR PBB SHADOW E&M-EST. PATIENT-LVL III: ICD-10-PCS | Mod: PBBFAC,,, | Performed by: FAMILY MEDICINE

## 2019-07-15 PROCEDURE — 3074F SYST BP LT 130 MM HG: CPT | Mod: CPTII,S$GLB,, | Performed by: FAMILY MEDICINE

## 2019-07-15 PROCEDURE — 99214 PR OFFICE/OUTPT VISIT, EST, LEVL IV, 30-39 MIN: ICD-10-PCS | Mod: S$GLB,,, | Performed by: FAMILY MEDICINE

## 2019-07-15 PROCEDURE — 1101F PR PT FALLS ASSESS DOC 0-1 FALLS W/OUT INJ PAST YR: ICD-10-PCS | Mod: CPTII,S$GLB,, | Performed by: FAMILY MEDICINE

## 2019-07-15 PROCEDURE — 3078F PR MOST RECENT DIASTOLIC BLOOD PRESSURE < 80 MM HG: ICD-10-PCS | Mod: CPTII,S$GLB,, | Performed by: FAMILY MEDICINE

## 2019-07-15 PROCEDURE — 3074F PR MOST RECENT SYSTOLIC BLOOD PRESSURE < 130 MM HG: ICD-10-PCS | Mod: CPTII,S$GLB,, | Performed by: FAMILY MEDICINE

## 2019-07-15 PROCEDURE — 99999 PR PBB SHADOW E&M-EST. PATIENT-LVL III: CPT | Mod: PBBFAC,,, | Performed by: FAMILY MEDICINE

## 2019-07-15 PROCEDURE — 1101F PT FALLS ASSESS-DOCD LE1/YR: CPT | Mod: CPTII,S$GLB,, | Performed by: FAMILY MEDICINE

## 2019-07-15 PROCEDURE — 3008F BODY MASS INDEX DOCD: CPT | Mod: CPTII,S$GLB,, | Performed by: FAMILY MEDICINE

## 2019-07-15 PROCEDURE — 83036 HEMOGLOBIN GLYCOSYLATED A1C: CPT

## 2019-07-15 PROCEDURE — 3045F PR MOST RECENT HEMOGLOBIN A1C LEVEL 7.0-9.0%: CPT | Mod: CPTII,S$GLB,, | Performed by: FAMILY MEDICINE

## 2019-07-15 PROCEDURE — 3078F DIAST BP <80 MM HG: CPT | Mod: CPTII,S$GLB,, | Performed by: FAMILY MEDICINE

## 2019-07-15 PROCEDURE — 3008F PR BODY MASS INDEX (BMI) DOCUMENTED: ICD-10-PCS | Mod: CPTII,S$GLB,, | Performed by: FAMILY MEDICINE

## 2019-07-15 RX ORDER — FLUOCINONIDE 0.5 MG/G
CREAM TOPICAL
COMMUNITY
Start: 2019-07-08 | End: 2019-07-15

## 2019-07-15 RX ORDER — CYCLOBENZAPRINE HCL 10 MG
10 TABLET ORAL NIGHTLY
Qty: 30 TABLET | Refills: 1 | Status: SHIPPED | OUTPATIENT
Start: 2019-07-15 | End: 2019-07-25

## 2019-07-15 NOTE — PROGRESS NOTES
Subjective:       Patient ID: May Montoya is a 63 y.o. female.    Chief Complaint: Follow-up and Back Pain (left upper back pain; intermittent)    Pt is a 63 year old who last a1c was 7.4 which is an increase from previous year. Pt is due of ifbot. Pt is complaining of cervical muscle spasms without any trauma.     Review of Systems   Constitutional: Negative.    Respiratory: Negative.    Cardiovascular: Negative.    Gastrointestinal: Negative.    Genitourinary: Negative.    Musculoskeletal: Positive for back pain.   Neurological: Negative.    Psychiatric/Behavioral: Negative.        Objective:      Physical Exam   Constitutional: She is oriented to person, place, and time. She appears well-developed and well-nourished.   Cardiovascular: Normal rate and regular rhythm.   Pulmonary/Chest: Effort normal and breath sounds normal. No stridor.   Abdominal: Soft. Bowel sounds are normal.   Musculoskeletal:        Lumbar back: She exhibits decreased range of motion and spasm.   Neurological: She is alert and oriented to person, place, and time.   Skin: Skin is warm and dry.       Assessment:       1. Hypertension associated with diabetes    2. Type 2 diabetes mellitus without complication, without long-term current use of insulin    3. Encounter for colorectal cancer screening    4. Acute strain of neck muscle, initial encounter        Plan:       Hypertension associated with diabetes  Comments:  Pt BP is well controlled    Type 2 diabetes mellitus without complication, without long-term current use of insulin  Comments:  Will get hgA1c  Orders:  -     Hemoglobin A1c; Future; Expected date: 07/15/2019    Encounter for colorectal cancer screening  Comments:  Will do ifobt  Orders:  -     Fecal Immunochemical Test (iFOBT); Future; Expected date: 07/22/2019    Acute strain of neck muscle, initial encounter  Comments:  Will start on flexeril.     Other orders  -     cyclobenzaprine (FLEXERIL) 10 MG tablet; Take 1 tablet (10  mg total) by mouth every evening. for 10 days  Dispense: 30 tablet; Refill: 1

## 2019-07-24 ENCOUNTER — TELEPHONE (OUTPATIENT)
Dept: INTERNAL MEDICINE | Facility: CLINIC | Age: 64
End: 2019-07-24

## 2019-07-24 ENCOUNTER — PATIENT OUTREACH (OUTPATIENT)
Dept: ADMINISTRATIVE | Facility: HOSPITAL | Age: 64
End: 2019-07-24

## 2019-07-24 NOTE — PROGRESS NOTES
Contacted patient to follow up on overdue fit kit. Left message requesting a call back.    Patient states she will complete kit sometime next week and turn it in at the [facility]

## 2019-07-25 ENCOUNTER — TELEPHONE (OUTPATIENT)
Dept: INTERNAL MEDICINE | Facility: CLINIC | Age: 64
End: 2019-07-25

## 2019-07-25 NOTE — TELEPHONE ENCOUNTER
----- Message from Lindsay Alvarez sent at 7/25/2019  8:03 AM CDT -----  Contact: Patient  Type:  Patient Returning Call    Who Called:Patient  Who Left Message for Patient:nurse  Does the patient know what this is regarding?:test results  Would the patient rather a call back or a response via [facility]? call  Best Call Back Number:[phone]  Additional Information:

## 2019-07-26 ENCOUNTER — TELEPHONE (OUTPATIENT)
Dept: INTERNAL MEDICINE | Facility: CLINIC | Age: 64
End: 2019-07-26

## 2019-07-26 NOTE — TELEPHONE ENCOUNTER
Notes recorded by Morneo Martinez MD on 7/16/2019 at 4:26 AM CDT  Inform pt that A1C is 7.2 which is lower than previous months. Recommend just consistency in diet  ----------------  Result and recommendation conveyed to patient. Patient verbalized understanding, thanked me, and ended call.

## 2019-07-26 NOTE — TELEPHONE ENCOUNTER
----- Message from Lorena Drew sent at 7/26/2019  8:07 AM CDT -----  Contact: Patient   Type:  Patient Returning Call    Who Called: May  Who Left Message for Patient: Jose Ramon  Does the patient know what this is regarding?: Results  Would the patient rather a call back or a response via [facility]? Call back   Best Call Back Number: Please call her at [phone]  Additional Information: n/a

## 2019-08-21 NOTE — TELEPHONE ENCOUNTER
----- Message from Lorena Drew sent at 8/21/2019  8:57 AM CDT -----  Contact: Patient   Type:  RX Refill Request    Who Called: May  Refill or New Rx: Refill  RX Name and Strength: Pravastatin 40 mg  How is the patient currently taking it? (ex. 1XDay): 1x day  Is this a 30 day or 90 day RX: 30  Preferred Pharmacy with phone number:   [facility] - [address] - [id number] [address]  [address]  [place] [address]  Phone: [phone] Fax: [phone]  Local or Mail Order: Local  Ordering Provider: Dr. Martinez  Would the patient rather a call back or a response via [facility]? Call back   Best Call Back Number: Please call her at [phone]  Additional Information: n/a

## 2019-08-21 NOTE — TELEPHONE ENCOUNTER
I s/w pt informing pt that we received her rx request and will get request forwarded to Dr. Burrell. //PATRICIA

## 2019-08-22 RX ORDER — PRAVASTATIN SODIUM 40 MG/1
40 TABLET ORAL DAILY
Qty: 90 TABLET | Refills: 1 | Status: SHIPPED | OUTPATIENT
Start: 2019-08-22 | End: 2020-03-02 | Stop reason: SDUPTHER

## 2019-08-26 ENCOUNTER — APPOINTMENT (OUTPATIENT)
Dept: LAB | Facility: HOSPITAL | Age: 64
End: 2019-08-26
Attending: FAMILY MEDICINE
Payer: MEDICARE

## 2019-09-13 ENCOUNTER — TELEPHONE (OUTPATIENT)
Dept: INTERNAL MEDICINE | Facility: CLINIC | Age: 64
End: 2019-09-13

## 2019-09-13 NOTE — TELEPHONE ENCOUNTER
----- Message from Jaja Unger sent at 9/13/2019  8:06 AM CDT -----  Contact: pt   Type:  Test Results    Who Called:  Pt   Name of Test (Lab/Mammo/Etc): colonoscopy  Date of Test:  approx 3 weeks ago   Ordering Provider:  Juan   Where the test was performed: at home   Would the patient rather a call back or a response via [facility]? Phone   Best Call Back Number: [phone]  Additional Information:  retn nurses call

## 2019-09-13 NOTE — TELEPHONE ENCOUNTER
I returned pts call in regards to lab results. I informed her of her results. Pt thanked me for returning call and ended call. //BJ

## 2019-09-24 ENCOUNTER — PATIENT OUTREACH (OUTPATIENT)
Dept: ADMINISTRATIVE | Facility: HOSPITAL | Age: 64
End: 2019-09-24

## 2019-10-21 NOTE — TELEPHONE ENCOUNTER
----- Message from Justus Hansen sent at 10/21/2019  8:38 AM CDT -----  Type:  RX Refill Request    Who Called:  May Montoya  Refill or New Rx: refill  RX Name and Strength: CELEBREX 200 mg capsule  How is the patient currently taking it? (ex. 1XDay): 2xday  Is this a 30 day or 90 day RX: 30  Preferred Pharmacy with phone number:   [facility]  [address] - [address]  [address]  [facility] [address]  Phone: [phone] Fax: [phone]  Local or Mail Order: local  Ordering Provider: Juan  Would the patient rather a call back or a response via [facility]?  Call back  Best Call Back Number: [phone]  Additional Information:

## 2019-10-22 RX ORDER — CELECOXIB 200 MG/1
400 CAPSULE ORAL DAILY
Qty: 180 CAPSULE | Refills: 1 | Status: SHIPPED | OUTPATIENT
Start: 2019-10-22 | End: 2020-04-27 | Stop reason: SDUPTHER

## 2019-10-28 ENCOUNTER — LAB VISIT (OUTPATIENT)
Dept: LAB | Facility: HOSPITAL | Age: 64
End: 2019-10-28
Attending: FAMILY MEDICINE
Payer: MEDICARE

## 2019-10-28 ENCOUNTER — OFFICE VISIT (OUTPATIENT)
Dept: INTERNAL MEDICINE | Facility: CLINIC | Age: 64
End: 2019-10-28
Payer: MEDICARE

## 2019-10-28 VITALS
HEART RATE: 80 BPM | TEMPERATURE: 98 F | HEIGHT: 63 IN | DIASTOLIC BLOOD PRESSURE: 80 MMHG | OXYGEN SATURATION: 98 % | SYSTOLIC BLOOD PRESSURE: 114 MMHG | BODY MASS INDEX: 38.75 KG/M2 | WEIGHT: 218.69 LBS

## 2019-10-28 DIAGNOSIS — E11.69 HYPERLIPIDEMIA ASSOCIATED WITH TYPE 2 DIABETES MELLITUS: Chronic | ICD-10-CM

## 2019-10-28 DIAGNOSIS — E78.5 HYPERLIPIDEMIA ASSOCIATED WITH TYPE 2 DIABETES MELLITUS: Chronic | ICD-10-CM

## 2019-10-28 DIAGNOSIS — I15.2 HYPERTENSION ASSOCIATED WITH DIABETES: Primary | Chronic | ICD-10-CM

## 2019-10-28 DIAGNOSIS — E11.9 TYPE 2 DIABETES MELLITUS WITHOUT COMPLICATION, WITHOUT LONG-TERM CURRENT USE OF INSULIN: Chronic | ICD-10-CM

## 2019-10-28 DIAGNOSIS — E11.59 HYPERTENSION ASSOCIATED WITH DIABETES: Primary | Chronic | ICD-10-CM

## 2019-10-28 LAB
CHOLEST SERPL-MCNC: 213 MG/DL (ref 120–199)
CHOLEST/HDLC SERPL: 4.3 {RATIO} (ref 2–5)
HDLC SERPL-MCNC: 49 MG/DL (ref 40–75)
HDLC SERPL: 23 % (ref 20–50)
LDLC SERPL CALC-MCNC: 136.6 MG/DL (ref 63–159)
NONHDLC SERPL-MCNC: 164 MG/DL
TRIGL SERPL-MCNC: 137 MG/DL (ref 30–150)

## 2019-10-28 PROCEDURE — 99214 PR OFFICE/OUTPT VISIT, EST, LEVL IV, 30-39 MIN: ICD-10-PCS | Mod: S$GLB,,, | Performed by: FAMILY MEDICINE

## 2019-10-28 PROCEDURE — 3079F PR MOST RECENT DIASTOLIC BLOOD PRESSURE 80-89 MM HG: ICD-10-PCS | Mod: CPTII,S$GLB,, | Performed by: FAMILY MEDICINE

## 2019-10-28 PROCEDURE — 1101F PT FALLS ASSESS-DOCD LE1/YR: CPT | Mod: CPTII,S$GLB,, | Performed by: FAMILY MEDICINE

## 2019-10-28 PROCEDURE — G0008 FLU VACCINE - HIGH DOSE (65+) PRESERVATIVE FREE IM: ICD-10-PCS | Mod: S$GLB,,, | Performed by: FAMILY MEDICINE

## 2019-10-28 PROCEDURE — 80061 LIPID PANEL: CPT

## 2019-10-28 PROCEDURE — 3079F DIAST BP 80-89 MM HG: CPT | Mod: CPTII,S$GLB,, | Performed by: FAMILY MEDICINE

## 2019-10-28 PROCEDURE — 99999 PR PBB SHADOW E&M-EST. PATIENT-LVL III: ICD-10-PCS | Mod: PBBFAC,,, | Performed by: FAMILY MEDICINE

## 2019-10-28 PROCEDURE — 90662 IIV NO PRSV INCREASED AG IM: CPT | Mod: S$GLB,,, | Performed by: FAMILY MEDICINE

## 2019-10-28 PROCEDURE — 3074F SYST BP LT 130 MM HG: CPT | Mod: CPTII,S$GLB,, | Performed by: FAMILY MEDICINE

## 2019-10-28 PROCEDURE — 36415 COLL VENOUS BLD VENIPUNCTURE: CPT

## 2019-10-28 PROCEDURE — G0008 ADMIN INFLUENZA VIRUS VAC: HCPCS | Mod: S$GLB,,, | Performed by: FAMILY MEDICINE

## 2019-10-28 PROCEDURE — 99214 OFFICE O/P EST MOD 30 MIN: CPT | Mod: S$GLB,,, | Performed by: FAMILY MEDICINE

## 2019-10-28 PROCEDURE — 90662 FLU VACCINE - HIGH DOSE (65+) PRESERVATIVE FREE IM: ICD-10-PCS | Mod: S$GLB,,, | Performed by: FAMILY MEDICINE

## 2019-10-28 PROCEDURE — 3074F PR MOST RECENT SYSTOLIC BLOOD PRESSURE < 130 MM HG: ICD-10-PCS | Mod: CPTII,S$GLB,, | Performed by: FAMILY MEDICINE

## 2019-10-28 PROCEDURE — 99999 PR PBB SHADOW E&M-EST. PATIENT-LVL III: CPT | Mod: PBBFAC,,, | Performed by: FAMILY MEDICINE

## 2019-10-28 PROCEDURE — 1101F PR PT FALLS ASSESS DOC 0-1 FALLS W/OUT INJ PAST YR: ICD-10-PCS | Mod: CPTII,S$GLB,, | Performed by: FAMILY MEDICINE

## 2019-10-28 RX ORDER — METAXALONE 800 MG/1
TABLET ORAL
Refills: 1 | COMMUNITY
Start: 2019-09-26 | End: 2023-12-11

## 2019-10-28 RX ORDER — FLUOCINONIDE 0.5 MG/G
CREAM TOPICAL
COMMUNITY
Start: 2019-07-08 | End: 2020-07-07

## 2019-10-28 RX ORDER — DOXYCYCLINE 100 MG/1
100 CAPSULE ORAL
COMMUNITY
Start: 2019-10-08 | End: 2019-11-07

## 2019-10-28 RX ORDER — PENTOXIFYLLINE 400 MG/1
TABLET, EXTENDED RELEASE ORAL
COMMUNITY
Start: 2019-10-08 | End: 2021-03-29

## 2019-10-28 RX ORDER — ACETAMINOPHEN AND CODEINE PHOSPHATE 300; 30 MG/1; MG/1
1 TABLET ORAL 2 TIMES DAILY PRN
Refills: 0 | COMMUNITY
Start: 2019-10-15 | End: 2021-03-29
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
## 2019-11-01 ENCOUNTER — TELEPHONE (OUTPATIENT)
Dept: INTERNAL MEDICINE | Facility: CLINIC | Age: 64
End: 2019-11-01

## 2019-11-01 NOTE — TELEPHONE ENCOUNTER
----- Message from Deidre Quintana sent at 11/1/2019  8:05 AM CDT -----  Contact: pt  Type:  Patient Returning Call    Who Called: the pt   Who Left Message for Patient: Desiree  Does the patient know what this is regarding?: no  Would the patient rather a call back or a response via [facility]? Call back  Best Call Back Number: [phone] or [phone]  Additional Information: n/a

## 2019-11-04 ENCOUNTER — PATIENT OUTREACH (OUTPATIENT)
Dept: ADMINISTRATIVE | Facility: HOSPITAL | Age: 64
End: 2019-11-04

## 2019-12-16 ENCOUNTER — TELEPHONE (OUTPATIENT)
Dept: INTERNAL MEDICINE | Facility: CLINIC | Age: 64
End: 2019-12-16

## 2019-12-16 ENCOUNTER — OFFICE VISIT (OUTPATIENT)
Dept: INTERNAL MEDICINE | Facility: CLINIC | Age: 64
End: 2019-12-16
Payer: MEDICARE

## 2019-12-16 VITALS
WEIGHT: 216.06 LBS | BODY MASS INDEX: 38.28 KG/M2 | TEMPERATURE: 98 F | SYSTOLIC BLOOD PRESSURE: 126 MMHG | HEIGHT: 63 IN | OXYGEN SATURATION: 100 % | HEART RATE: 96 BPM | DIASTOLIC BLOOD PRESSURE: 74 MMHG

## 2019-12-16 DIAGNOSIS — J06.9 VIRAL URI WITH COUGH: Primary | ICD-10-CM

## 2019-12-16 DIAGNOSIS — E11.59 HYPERTENSION ASSOCIATED WITH DIABETES: Chronic | ICD-10-CM

## 2019-12-16 DIAGNOSIS — E11.9 TYPE 2 DIABETES MELLITUS WITHOUT COMPLICATION, WITHOUT LONG-TERM CURRENT USE OF INSULIN: Chronic | ICD-10-CM

## 2019-12-16 DIAGNOSIS — Z91.09 ENVIRONMENTAL ALLERGIES: ICD-10-CM

## 2019-12-16 DIAGNOSIS — I15.2 HYPERTENSION ASSOCIATED WITH DIABETES: Chronic | ICD-10-CM

## 2019-12-16 PROCEDURE — 3074F SYST BP LT 130 MM HG: CPT | Mod: CPTII,S$GLB,, | Performed by: NURSE PRACTITIONER

## 2019-12-16 PROCEDURE — 99213 PR OFFICE/OUTPT VISIT, EST, LEVL III, 20-29 MIN: ICD-10-PCS | Mod: 25,S$GLB,, | Performed by: NURSE PRACTITIONER

## 2019-12-16 PROCEDURE — 96372 PR INJECTION,THERAP/PROPH/DIAG2ST, IM OR SUBCUT: ICD-10-PCS | Mod: S$GLB,,, | Performed by: NURSE PRACTITIONER

## 2019-12-16 PROCEDURE — 1159F PR MEDICATION LIST DOCUMENTED IN MEDICAL RECORD: ICD-10-PCS | Mod: S$GLB,,, | Performed by: NURSE PRACTITIONER

## 2019-12-16 PROCEDURE — 1159F MED LIST DOCD IN RCRD: CPT | Mod: S$GLB,,, | Performed by: NURSE PRACTITIONER

## 2019-12-16 PROCEDURE — 3078F PR MOST RECENT DIASTOLIC BLOOD PRESSURE < 80 MM HG: ICD-10-PCS | Mod: CPTII,S$GLB,, | Performed by: NURSE PRACTITIONER

## 2019-12-16 PROCEDURE — 1125F PR PAIN SEVERITY QUANTIFIED, PAIN PRESENT: ICD-10-PCS | Mod: S$GLB,,, | Performed by: NURSE PRACTITIONER

## 2019-12-16 PROCEDURE — 99213 OFFICE O/P EST LOW 20 MIN: CPT | Mod: 25,S$GLB,, | Performed by: NURSE PRACTITIONER

## 2019-12-16 PROCEDURE — 1101F PR PT FALLS ASSESS DOC 0-1 FALLS W/OUT INJ PAST YR: ICD-10-PCS | Mod: CPTII,S$GLB,, | Performed by: NURSE PRACTITIONER

## 2019-12-16 PROCEDURE — 99999 PR PBB SHADOW E&M-EST. PATIENT-LVL IV: ICD-10-PCS | Mod: PBBFAC,,, | Performed by: NURSE PRACTITIONER

## 2019-12-16 PROCEDURE — 3074F PR MOST RECENT SYSTOLIC BLOOD PRESSURE < 130 MM HG: ICD-10-PCS | Mod: CPTII,S$GLB,, | Performed by: NURSE PRACTITIONER

## 2019-12-16 PROCEDURE — 99999 PR PBB SHADOW E&M-EST. PATIENT-LVL IV: CPT | Mod: PBBFAC,,, | Performed by: NURSE PRACTITIONER

## 2019-12-16 PROCEDURE — 1101F PT FALLS ASSESS-DOCD LE1/YR: CPT | Mod: CPTII,S$GLB,, | Performed by: NURSE PRACTITIONER

## 2019-12-16 PROCEDURE — 96372 THER/PROPH/DIAG INJ SC/IM: CPT | Mod: S$GLB,,, | Performed by: NURSE PRACTITIONER

## 2019-12-16 PROCEDURE — 3078F DIAST BP <80 MM HG: CPT | Mod: CPTII,S$GLB,, | Performed by: NURSE PRACTITIONER

## 2019-12-16 PROCEDURE — 1125F AMNT PAIN NOTED PAIN PRSNT: CPT | Mod: S$GLB,,, | Performed by: NURSE PRACTITIONER

## 2019-12-16 RX ORDER — FLUTICASONE PROPIONATE 50 MCG
2 SPRAY, SUSPENSION (ML) NASAL DAILY
Qty: 16 G | Refills: 0 | Status: SHIPPED | OUTPATIENT
Start: 2019-12-16 | End: 2021-03-29

## 2019-12-16 RX ORDER — BETAMETHASONE SODIUM PHOSPHATE AND BETAMETHASONE ACETATE 3; 3 MG/ML; MG/ML
6 INJECTION, SUSPENSION INTRA-ARTICULAR; INTRALESIONAL; INTRAMUSCULAR; SOFT TISSUE
Status: COMPLETED | OUTPATIENT
Start: 2019-12-16 | End: 2019-12-16

## 2019-12-16 RX ADMIN — BETAMETHASONE SODIUM PHOSPHATE AND BETAMETHASONE ACETATE 6 MG: 3; 3 INJECTION, SUSPENSION INTRA-ARTICULAR; INTRALESIONAL; INTRAMUSCULAR; SOFT TISSUE at 12:12

## 2019-12-16 NOTE — PROGRESS NOTES
Subjective:       Patient ID: May Montoya is a 64 y.o. female.    Chief Complaint: Cough and Nasal Congestion    URI    This is a new problem. The current episode started in the past 7 days. The problem has been waxing and waning. There has been no fever. Associated symptoms include congestion, coughing, rhinorrhea and sneezing. Pertinent negatives include no abdominal pain, chest pain, diarrhea, dysuria, ear pain, headaches, joint pain, joint swelling, nausea, neck pain, plugged ear sensation, rash, sinus pain, sore throat, swollen glands, vomiting or wheezing. She has tried antihistamine for the symptoms. The treatment provided mild relief.           Past Medical History:   Diagnosis Date    Diabetes mellitus     Hypertension      Past Surgical History:   Procedure Laterality Date     SECTION      CHOLECYSTECTOMY      GALLBLADDER SURGERY      peripheral artery angioplasty      TOTAL KNEE ARTHROPLASTY      L     Social History     Socioeconomic History    Marital status:      Spouse name: Not on file    Number of children: Not on file    Years of education: Not on file    Highest education level: Not on file   Occupational History    Not on file   Social Needs    Financial resource strain: Not on file    Food insecurity:     Worry: Not on file     Inability: Not on file    Transportation needs:     Medical: Not on file     Non-medical: Not on file   Tobacco Use    Smoking status: Never Smoker    Smokeless tobacco: Never Used   Substance and Sexual Activity    Alcohol use: No    Drug use: No    Sexual activity: Never   Lifestyle    Physical activity:     Days per week: Not on file     Minutes per session: Not on file    Stress: Not on file   Relationships    Social connections:     Talks on phone: Not on file     Gets together: Not on file     Attends Gnosticist service: Not on file     Active member of club or organization: Not on file     Attends meetings of clubs or  organizations: Not on file     Relationship status: Not on file   Other Topics Concern    Not on file   Social History Narrative    Not on file     Review of patient's allergies indicates:   Allergen Reactions    Avelox [moxifloxacin]      Current Outpatient Medications   Medication Sig    acetaminophen-codeine 300-30mg (TYLENOL #3) 300-30 mg Tab Take 1 tablet by mouth 2 (two) times daily as needed.    amlodipine-benazepril 2.5-10 mg (LOTREL) 2.5-10 mg per capsule Take 1 capsule by mouth once daily.    aspirin (ECOTRIN) 81 MG EC tablet Take 81 mg by mouth once daily.    betamethasone valerate 0.1% (VALISONE) 0.1 % Lotn Apply topically 2 (two) times daily. for 10 days    celecoxib (CELEBREX) 200 MG capsule Take 2 capsules (400 mg total) by mouth once daily.    cetirizine (ZYRTEC) 5 MG tablet Take 5 mg by mouth once daily.    cholecalciferol, vitamin D3, (VITAMIN D3) 2,000 unit Cap Take 1 capsule by mouth once daily.    fluocinonide 0.05% (LIDEX) 0.05 % cream Apply to the affected area twice daily for 2 weeks as needed.  Do not apply to face, underarms, or groin.    GLUCOSAMINE/D3/BOSWELLIA MAYELIN (OSTEO BI-FLEX, 5-LOXIN, ORAL) Take 2 tablets by mouth once daily.    metaxalone (SKELAXIN) 800 MG tablet TAKE 1 TABLET TWICE DAILY AS NEEDED FOR SPASMS    metFORMIN (GLUCOPHAGE) 500 MG tablet Take 1 tablet (500 mg total) by mouth daily with breakfast.    multivit-mineral-iron-lutein (CENTRUM SILVER ULTRA WOMEN'S) Tab Take 1 tablet by mouth once daily.    pentoxifylline (TRENTAL) 400 mg TbSR Take 1 by mouth three times daily    pravastatin (PRAVACHOL) 40 MG tablet Take 1 tablet (40 mg total) by mouth once daily.    valACYclovir (VALTREX) 1000 MG tablet Take 1 tablet (1,000 mg total) by mouth 3 (three) times daily. for 7 days    fluticasone propionate (FLONASE) 50 mcg/actuation nasal spray 2 sprays (100 mcg total) by Each Nostril route once daily.     No current facility-administered medications for this  visit.            Review of Systems   Constitutional: Negative for activity change, appetite change, chills, diaphoresis, fatigue, fever and unexpected weight change.   HENT: Positive for congestion, postnasal drip, rhinorrhea and sneezing. Negative for ear pain, sinus pressure, sinus pain, sore throat, tinnitus, trouble swallowing and voice change.    Eyes: Negative for photophobia, pain and visual disturbance.   Respiratory: Positive for cough. Negative for chest tightness, shortness of breath and wheezing.    Cardiovascular: Negative for chest pain, palpitations and leg swelling.   Gastrointestinal: Negative for abdominal distention, abdominal pain, constipation, diarrhea, nausea and vomiting.   Genitourinary: Negative for decreased urine volume, difficulty urinating, dysuria, flank pain, frequency, hematuria and urgency.   Musculoskeletal: Negative for arthralgias, back pain, joint pain, joint swelling, neck pain and neck stiffness.   Skin: Negative for rash.   Allergic/Immunologic: Negative for immunocompromised state.   Neurological: Negative for dizziness, tremors, seizures, syncope, facial asymmetry, speech difficulty, weakness, light-headedness, numbness and headaches.   Hematological: Negative for adenopathy. Does not bruise/bleed easily.   Psychiatric/Behavioral: Negative for confusion and sleep disturbance.       Objective:      Physical Exam   Constitutional: She is oriented to person, place, and time.   HENT:   Right Ear: Tympanic membrane normal.   Nose: Mucosal edema and rhinorrhea present.   Mouth/Throat: Uvula is midline, oropharynx is clear and moist and mucous membranes are normal.   Cardiovascular: Normal rate, regular rhythm and normal heart sounds.   Pulmonary/Chest: Effort normal and breath sounds normal.   Musculoskeletal: Normal range of motion.   Neurological: She is alert and oriented to person, place, and time.   Skin: Skin is warm and dry. Capillary refill takes less than 2 seconds.    Psychiatric: She has a normal mood and affect.       Assessment:     Vitals:    12/16/19 1200   BP: 126/74   Pulse: 96   Temp: 97.8 °F (36.6 °C)         1. Viral URI with cough    2. Hypertension associated with diabetes    3. Environmental allergies    4. Type 2 diabetes mellitus without complication, without long-term current use of insulin        Plan:   Viral URI with cough  -     fluticasone propionate (FLONASE) 50 mcg/actuation nasal spray; 2 sprays (100 mcg total) by Each Nostril route once daily.  Dispense: 16 g; Refill: 0  -     betamethasone acetate-betamethasone sodium phosphate injection 6 mg    Hypertension associated with diabetes    Environmental allergies  -     fluticasone propionate (FLONASE) 50 mcg/actuation nasal spray; 2 sprays (100 mcg total) by Each Nostril route once daily.  Dispense: 16 g; Refill: 0    Type 2 diabetes mellitus without complication, without long-term current use of insulin          As above  meds and SE discussed

## 2019-12-16 NOTE — TELEPHONE ENCOUNTER
----- Message from Deidre Quintana sent at 12/16/2019  7:34 AM CST -----  Contact: pt  Type:  Same Day Appointment Request    Caller is requesting a same day appointment.  Caller declined first available appointment listed below.    Name of Caller: the pt   When is the first available appointment? 12/17/2019  Symptoms: cough, runny nose  Best Call Back Number: [phone] or [phone]  Additional Information: n/a

## 2019-12-16 NOTE — TELEPHONE ENCOUNTER
I s/w pt in regards to same day appointment, I was able to schedule pt an appointment for 12/16/2019 with Hafsa Hooks NP at 11:40. Pt thanked me for the call and ended call. //BJ

## 2019-12-24 ENCOUNTER — LAB VISIT (OUTPATIENT)
Dept: LAB | Facility: HOSPITAL | Age: 64
End: 2019-12-24
Attending: FAMILY MEDICINE
Payer: MEDICARE

## 2019-12-24 ENCOUNTER — OFFICE VISIT (OUTPATIENT)
Dept: INTERNAL MEDICINE | Facility: CLINIC | Age: 64
End: 2019-12-24
Payer: MEDICARE

## 2019-12-24 ENCOUNTER — CLINICAL SUPPORT (OUTPATIENT)
Dept: CARDIOLOGY | Facility: CLINIC | Age: 64
End: 2019-12-24
Payer: MEDICARE

## 2019-12-24 VITALS
DIASTOLIC BLOOD PRESSURE: 76 MMHG | SYSTOLIC BLOOD PRESSURE: 118 MMHG | HEIGHT: 63 IN | BODY MASS INDEX: 38.28 KG/M2 | TEMPERATURE: 99 F | WEIGHT: 216.06 LBS | HEART RATE: 58 BPM

## 2019-12-24 DIAGNOSIS — R42 DIZZINESS: Primary | ICD-10-CM

## 2019-12-24 DIAGNOSIS — R11.2 INTRACTABLE VOMITING WITH NAUSEA, UNSPECIFIED VOMITING TYPE: ICD-10-CM

## 2019-12-24 DIAGNOSIS — R42 DIZZINESS: ICD-10-CM

## 2019-12-24 DIAGNOSIS — E11.9 TYPE 2 DIABETES MELLITUS WITHOUT COMPLICATION, WITHOUT LONG-TERM CURRENT USE OF INSULIN: Chronic | ICD-10-CM

## 2019-12-24 LAB
ALBUMIN SERPL BCP-MCNC: 3.6 G/DL (ref 3.5–5.2)
ALP SERPL-CCNC: 53 U/L (ref 55–135)
ALT SERPL W/O P-5'-P-CCNC: 19 U/L (ref 10–44)
ANION GAP SERPL CALC-SCNC: 10 MMOL/L (ref 8–16)
AST SERPL-CCNC: 16 U/L (ref 10–40)
BASOPHILS # BLD AUTO: 0.03 K/UL (ref 0–0.2)
BASOPHILS NFR BLD: 0.4 % (ref 0–1.9)
BILIRUB SERPL-MCNC: 0.4 MG/DL (ref 0.1–1)
BUN SERPL-MCNC: 16 MG/DL (ref 8–23)
CALCIUM SERPL-MCNC: 9.6 MG/DL (ref 8.7–10.5)
CHLORIDE SERPL-SCNC: 103 MMOL/L (ref 95–110)
CO2 SERPL-SCNC: 26 MMOL/L (ref 23–29)
CREAT SERPL-MCNC: 0.7 MG/DL (ref 0.5–1.4)
DIFFERENTIAL METHOD: ABNORMAL
EOSINOPHIL # BLD AUTO: 0.1 K/UL (ref 0–0.5)
EOSINOPHIL NFR BLD: 1.4 % (ref 0–8)
ERYTHROCYTE [DISTWIDTH] IN BLOOD BY AUTOMATED COUNT: 12.1 % (ref 11.5–14.5)
EST. GFR  (AFRICAN AMERICAN): >60 ML/MIN/1.73 M^2
EST. GFR  (NON AFRICAN AMERICAN): >60 ML/MIN/1.73 M^2
GLUCOSE SERPL-MCNC: 169 MG/DL (ref 70–110)
GLUCOSE SERPL-MCNC: 176 MG/DL (ref 70–110)
HCT VFR BLD AUTO: 43.4 % (ref 37–48.5)
HGB BLD-MCNC: 14 G/DL (ref 12–16)
IMM GRANULOCYTES # BLD AUTO: 0.03 K/UL (ref 0–0.04)
IMM GRANULOCYTES NFR BLD AUTO: 0.4 % (ref 0–0.5)
INFLUENZA A, MOLECULAR: NEGATIVE
INFLUENZA B, MOLECULAR: NEGATIVE
LYMPHOCYTES # BLD AUTO: 1.1 K/UL (ref 1–4.8)
LYMPHOCYTES NFR BLD: 14.9 % (ref 18–48)
MCH RBC QN AUTO: 30.2 PG (ref 27–31)
MCHC RBC AUTO-ENTMCNC: 32.3 G/DL (ref 32–36)
MCV RBC AUTO: 94 FL (ref 82–98)
MONOCYTES # BLD AUTO: 0.4 K/UL (ref 0.3–1)
MONOCYTES NFR BLD: 5.3 % (ref 4–15)
NEUTROPHILS # BLD AUTO: 5.7 K/UL (ref 1.8–7.7)
NEUTROPHILS NFR BLD: 77.6 % (ref 38–73)
NRBC BLD-RTO: 0 /100 WBC
PLATELET # BLD AUTO: 255 K/UL (ref 150–350)
PMV BLD AUTO: 10.5 FL (ref 9.2–12.9)
POTASSIUM SERPL-SCNC: 4.7 MMOL/L (ref 3.5–5.1)
PROT SERPL-MCNC: 7 G/DL (ref 6–8.4)
RBC # BLD AUTO: 4.63 M/UL (ref 4–5.4)
SODIUM SERPL-SCNC: 139 MMOL/L (ref 136–145)
SPECIMEN SOURCE: NORMAL
WBC # BLD AUTO: 7.37 K/UL (ref 3.9–12.7)

## 2019-12-24 PROCEDURE — 1126F PR PAIN SEVERITY QUANTIFIED, NO PAIN PRESENT: ICD-10-PCS | Mod: S$GLB,,, | Performed by: FAMILY MEDICINE

## 2019-12-24 PROCEDURE — 1126F AMNT PAIN NOTED NONE PRSNT: CPT | Mod: S$GLB,,, | Performed by: FAMILY MEDICINE

## 2019-12-24 PROCEDURE — 36415 COLL VENOUS BLD VENIPUNCTURE: CPT

## 2019-12-24 PROCEDURE — 93010 EKG 12-LEAD: ICD-10-PCS | Mod: S$GLB,,, | Performed by: INTERNAL MEDICINE

## 2019-12-24 PROCEDURE — 85025 COMPLETE CBC W/AUTO DIFF WBC: CPT

## 2019-12-24 PROCEDURE — 93010 ELECTROCARDIOGRAM REPORT: CPT | Mod: S$GLB,,, | Performed by: INTERNAL MEDICINE

## 2019-12-24 PROCEDURE — 82962 GLUCOSE BLOOD TEST: CPT | Mod: S$GLB,,, | Performed by: FAMILY MEDICINE

## 2019-12-24 PROCEDURE — 82962 POCT GLUCOSE, HAND-HELD DEVICE: ICD-10-PCS | Mod: S$GLB,,, | Performed by: FAMILY MEDICINE

## 2019-12-24 PROCEDURE — 93005 EKG 12-LEAD: ICD-10-PCS | Mod: S$GLB,,, | Performed by: FAMILY MEDICINE

## 2019-12-24 PROCEDURE — 87502 INFLUENZA DNA AMP PROBE: CPT

## 2019-12-24 PROCEDURE — 3078F DIAST BP <80 MM HG: CPT | Mod: CPTII,S$GLB,, | Performed by: FAMILY MEDICINE

## 2019-12-24 PROCEDURE — 99999 PR PBB SHADOW E&M-EST. PATIENT-LVL III: ICD-10-PCS | Mod: PBBFAC,,, | Performed by: FAMILY MEDICINE

## 2019-12-24 PROCEDURE — 80053 COMPREHEN METABOLIC PANEL: CPT

## 2019-12-24 PROCEDURE — 1159F MED LIST DOCD IN RCRD: CPT | Mod: S$GLB,,, | Performed by: FAMILY MEDICINE

## 2019-12-24 PROCEDURE — 3074F PR MOST RECENT SYSTOLIC BLOOD PRESSURE < 130 MM HG: ICD-10-PCS | Mod: CPTII,S$GLB,, | Performed by: FAMILY MEDICINE

## 2019-12-24 PROCEDURE — 99214 OFFICE O/P EST MOD 30 MIN: CPT | Mod: S$GLB,,, | Performed by: FAMILY MEDICINE

## 2019-12-24 PROCEDURE — 1101F PR PT FALLS ASSESS DOC 0-1 FALLS W/OUT INJ PAST YR: ICD-10-PCS | Mod: CPTII,S$GLB,, | Performed by: FAMILY MEDICINE

## 2019-12-24 PROCEDURE — 93005 ELECTROCARDIOGRAM TRACING: CPT | Mod: S$GLB,,, | Performed by: FAMILY MEDICINE

## 2019-12-24 PROCEDURE — 99999 PR PBB SHADOW E&M-EST. PATIENT-LVL III: CPT | Mod: PBBFAC,,, | Performed by: FAMILY MEDICINE

## 2019-12-24 PROCEDURE — 3078F PR MOST RECENT DIASTOLIC BLOOD PRESSURE < 80 MM HG: ICD-10-PCS | Mod: CPTII,S$GLB,, | Performed by: FAMILY MEDICINE

## 2019-12-24 PROCEDURE — 1101F PT FALLS ASSESS-DOCD LE1/YR: CPT | Mod: CPTII,S$GLB,, | Performed by: FAMILY MEDICINE

## 2019-12-24 PROCEDURE — 99214 PR OFFICE/OUTPT VISIT, EST, LEVL IV, 30-39 MIN: ICD-10-PCS | Mod: S$GLB,,, | Performed by: FAMILY MEDICINE

## 2019-12-24 PROCEDURE — 3074F SYST BP LT 130 MM HG: CPT | Mod: CPTII,S$GLB,, | Performed by: FAMILY MEDICINE

## 2019-12-24 PROCEDURE — 1159F PR MEDICATION LIST DOCUMENTED IN MEDICAL RECORD: ICD-10-PCS | Mod: S$GLB,,, | Performed by: FAMILY MEDICINE

## 2019-12-24 RX ORDER — ONDANSETRON 4 MG/1
4 TABLET, ORALLY DISINTEGRATING ORAL EVERY 8 HOURS PRN
Qty: 30 TABLET | Refills: 0 | Status: SHIPPED | OUTPATIENT
Start: 2019-12-24 | End: 2021-03-29

## 2019-12-24 RX ORDER — MECLIZINE HYDROCHLORIDE 25 MG/1
25 TABLET ORAL 3 TIMES DAILY PRN
Qty: 30 TABLET | Refills: 0 | Status: SHIPPED | OUTPATIENT
Start: 2019-12-24 | End: 2021-03-29

## 2019-12-24 NOTE — PROGRESS NOTES
Subjective:       Patient ID: May Montoya is a 64 y.o. female.    Chief Complaint: Dizziness (today ) and Nausea (today)    Pt is reporting some nausea and vomiting less than 12 hours ago. Pt BP is well controlled. Pt feels light headed. Pt has increased the use of her skelaxin in the last few days. No sinus symptoms. Denied vertigo. Denied CP or SOB    Review of Systems   Constitutional: Negative.    Respiratory: Negative.    Cardiovascular: Negative.        Objective:      Physical Exam   Constitutional: She is oriented to person, place, and time. She appears well-developed and well-nourished.   Cardiovascular: Normal rate and regular rhythm. Exam reveals no friction rub.   No murmur heard.  Neurological: She is alert and oriented to person, place, and time.   Skin: Skin is warm and dry.       Assessment:       1. Dizziness    2. Intractable vomiting with nausea, unspecified vomiting type    3. Type 2 diabetes mellitus without complication, without long-term current use of insulin        Plan:       Dizziness  Comments:  Will get urine, check sugars. CMP. meclazine-symptom treat  Orders:  -     SCHEDULED EKG 12-LEAD (to Muse); Future    Intractable vomiting with nausea, unspecified vomiting type  Comments:  May be a combo of medication interaction. stop the skelaxin and stop the pentoxifylline. zofran for nausea  Orders:  -     Influenza A & B by Molecular  -     Urinalysis  -     Urinalysis; Future; Expected date: 12/24/2019  -     Comprehensive metabolic panel; Future; Expected date: 12/24/2019  -     CBC auto differential; Future; Expected date: 12/24/2019    Type 2 diabetes mellitus without complication, without long-term current use of insulin  Comments:  Will check sugar  Orders:  -     POCT Glucose, Hand-Held Device    Other orders  -     meclizine (ANTIVERT) 25 mg tablet; Take 1 tablet (25 mg total) by mouth 3 (three) times daily as needed.  Dispense: 30 tablet; Refill: 0  -     ondansetron (ZOFRAN-ODT)  4 MG TbDL; Take 1 tablet (4 mg total) by mouth every 8 (eight) hours as needed.  Dispense: 30 tablet; Refill: 0

## 2019-12-27 DIAGNOSIS — R94.31 ABNORMAL EKG: Primary | ICD-10-CM

## 2019-12-30 ENCOUNTER — TELEPHONE (OUTPATIENT)
Dept: INTERNAL MEDICINE | Facility: CLINIC | Age: 64
End: 2019-12-30

## 2019-12-30 NOTE — TELEPHONE ENCOUNTER
----- Message from Yesica Drew sent at 12/30/2019 12:52 PM CST -----  Contact: pt   Type:  Test Results    Who Called:  .May Montoya   Name of Test (Lab/Mammo/Etc):   EKG   Date of Test: 12/24/19  Ordering Provider: Juan   Where the test was performed: [facility]   Would the patient rather a call back or a response via [facility]? Call back   Best Call Back Number: [phone] (home)   Additional Information:

## 2019-12-30 NOTE — TELEPHONE ENCOUNTER
I returned pt's call in regards to EKG results. I informed pt that Dr. Martinez would like to send her to Cardiology. Pt stated that she has a cardiologist that she see already and she would come in a  a copy of her EKG. Pt thanked me for the call and ended call. //BJ

## 2020-01-07 ENCOUNTER — PATIENT MESSAGE (OUTPATIENT)
Dept: URGENT CARE | Facility: CLINIC | Age: 65
End: 2020-01-07

## 2020-03-03 RX ORDER — PRAVASTATIN SODIUM 40 MG/1
40 TABLET ORAL DAILY
Qty: 90 TABLET | Refills: 1 | Status: SHIPPED | OUTPATIENT
Start: 2020-03-03 | End: 2020-09-30 | Stop reason: SDUPTHER

## 2020-04-01 DIAGNOSIS — E11.9 TYPE 2 DIABETES MELLITUS WITHOUT COMPLICATION, WITHOUT LONG-TERM CURRENT USE OF INSULIN: Chronic | ICD-10-CM

## 2020-04-01 DIAGNOSIS — I15.2 HYPERTENSION ASSOCIATED WITH DIABETES: Chronic | ICD-10-CM

## 2020-04-01 DIAGNOSIS — E11.59 HYPERTENSION ASSOCIATED WITH DIABETES: Chronic | ICD-10-CM

## 2020-04-01 RX ORDER — METFORMIN HYDROCHLORIDE 500 MG/1
500 TABLET ORAL
Qty: 90 TABLET | Refills: 1 | Status: SHIPPED | OUTPATIENT
Start: 2020-04-01 | End: 2020-09-30 | Stop reason: SDUPTHER

## 2020-04-01 RX ORDER — AMLODIPINE BESYLATE AND BENAZEPRIL HYDROCHLORIDE 2.5; 1 MG/1; MG/1
1 CAPSULE ORAL DAILY
Qty: 90 CAPSULE | Refills: 1 | Status: SHIPPED | OUTPATIENT
Start: 2020-04-01 | End: 2020-09-30 | Stop reason: SDUPTHER

## 2020-04-27 RX ORDER — CELECOXIB 200 MG/1
400 CAPSULE ORAL DAILY
Qty: 180 CAPSULE | Refills: 1 | Status: SHIPPED | OUTPATIENT
Start: 2020-04-27 | End: 2020-09-30 | Stop reason: SDUPTHER

## 2020-05-26 ENCOUNTER — OFFICE VISIT (OUTPATIENT)
Dept: INTERNAL MEDICINE | Facility: CLINIC | Age: 65
End: 2020-05-26
Payer: MEDICARE

## 2020-05-26 ENCOUNTER — HOSPITAL ENCOUNTER (OUTPATIENT)
Dept: RADIOLOGY | Facility: HOSPITAL | Age: 65
Discharge: HOME | End: 2020-05-26
Attending: FAMILY MEDICINE
Payer: MEDICARE

## 2020-05-26 VITALS
TEMPERATURE: 97 F | HEIGHT: 63 IN | WEIGHT: 220.44 LBS | DIASTOLIC BLOOD PRESSURE: 78 MMHG | SYSTOLIC BLOOD PRESSURE: 120 MMHG | BODY MASS INDEX: 39.06 KG/M2 | HEART RATE: 101 BPM

## 2020-05-26 DIAGNOSIS — I73.9 PERIPHERAL VASCULAR DISEASE, UNSPECIFIED: ICD-10-CM

## 2020-05-26 DIAGNOSIS — Z01.818 PREOP EXAMINATION: ICD-10-CM

## 2020-05-26 DIAGNOSIS — Z01.818 PREOP EXAMINATION: Primary | ICD-10-CM

## 2020-05-26 DIAGNOSIS — M48.02 CERVICAL STENOSIS OF SPINE: ICD-10-CM

## 2020-05-26 DIAGNOSIS — I15.2 HYPERTENSION ASSOCIATED WITH DIABETES: ICD-10-CM

## 2020-05-26 DIAGNOSIS — E11.59 HYPERTENSION ASSOCIATED WITH DIABETES: ICD-10-CM

## 2020-05-26 PROCEDURE — 71046 X-RAY EXAM CHEST 2 VIEWS: CPT | Mod: 26,,, | Performed by: RADIOLOGY

## 2020-05-26 PROCEDURE — 99214 OFFICE O/P EST MOD 30 MIN: CPT | Mod: S$GLB,,, | Performed by: FAMILY MEDICINE

## 2020-05-26 PROCEDURE — 1101F PR PT FALLS ASSESS DOC 0-1 FALLS W/OUT INJ PAST YR: ICD-10-PCS | Mod: CPTII,S$GLB,, | Performed by: FAMILY MEDICINE

## 2020-05-26 PROCEDURE — 1101F PT FALLS ASSESS-DOCD LE1/YR: CPT | Mod: CPTII,S$GLB,, | Performed by: FAMILY MEDICINE

## 2020-05-26 PROCEDURE — 1159F PR MEDICATION LIST DOCUMENTED IN MEDICAL RECORD: ICD-10-PCS | Mod: S$GLB,,, | Performed by: FAMILY MEDICINE

## 2020-05-26 PROCEDURE — 1125F AMNT PAIN NOTED PAIN PRSNT: CPT | Mod: S$GLB,,, | Performed by: FAMILY MEDICINE

## 2020-05-26 PROCEDURE — 99214 PR OFFICE/OUTPT VISIT, EST, LEVL IV, 30-39 MIN: ICD-10-PCS | Mod: S$GLB,,, | Performed by: FAMILY MEDICINE

## 2020-05-26 PROCEDURE — 71046 X-RAY EXAM CHEST 2 VIEWS: CPT | Mod: TC

## 2020-05-26 PROCEDURE — 1159F MED LIST DOCD IN RCRD: CPT | Mod: S$GLB,,, | Performed by: FAMILY MEDICINE

## 2020-05-26 PROCEDURE — 3074F PR MOST RECENT SYSTOLIC BLOOD PRESSURE < 130 MM HG: ICD-10-PCS | Mod: CPTII,S$GLB,, | Performed by: FAMILY MEDICINE

## 2020-05-26 PROCEDURE — 3078F PR MOST RECENT DIASTOLIC BLOOD PRESSURE < 80 MM HG: ICD-10-PCS | Mod: CPTII,S$GLB,, | Performed by: FAMILY MEDICINE

## 2020-05-26 PROCEDURE — 1125F PR PAIN SEVERITY QUANTIFIED, PAIN PRESENT: ICD-10-PCS | Mod: S$GLB,,, | Performed by: FAMILY MEDICINE

## 2020-05-26 PROCEDURE — 3078F DIAST BP <80 MM HG: CPT | Mod: CPTII,S$GLB,, | Performed by: FAMILY MEDICINE

## 2020-05-26 PROCEDURE — 71046 XR CHEST PA AND LATERAL: ICD-10-PCS | Mod: 26,,, | Performed by: RADIOLOGY

## 2020-05-26 PROCEDURE — 99999 PR PBB SHADOW E&M-EST. PATIENT-LVL III: CPT | Mod: PBBFAC,,, | Performed by: FAMILY MEDICINE

## 2020-05-26 PROCEDURE — 99999 PR PBB SHADOW E&M-EST. PATIENT-LVL III: ICD-10-PCS | Mod: PBBFAC,,, | Performed by: FAMILY MEDICINE

## 2020-05-26 PROCEDURE — 87081 CULTURE SCREEN ONLY: CPT

## 2020-05-26 PROCEDURE — 3074F SYST BP LT 130 MM HG: CPT | Mod: CPTII,S$GLB,, | Performed by: FAMILY MEDICINE

## 2020-05-26 PROCEDURE — 3051F PR MOST RECENT HEMOGLOBIN A1C LEVEL 7.0 - < 8.0%: ICD-10-PCS | Mod: CPTII,S$GLB,, | Performed by: FAMILY MEDICINE

## 2020-05-26 PROCEDURE — 3051F HG A1C>EQUAL 7.0%<8.0%: CPT | Mod: CPTII,S$GLB,, | Performed by: FAMILY MEDICINE

## 2020-05-26 NOTE — PROGRESS NOTES
Subjective:       Patient ID: May Montoya is a 64 y.o. female.    Chief Complaint: Pre-op Exam    Pt is a 64 year old here for neck surgery. Pt has had surgeries in the past and did well without any problems. Pt has had Cardiac clearance already.    Review of Systems   Constitutional: Negative.    Respiratory: Negative.    Cardiovascular: Negative.    Genitourinary: Negative.    Psychiatric/Behavioral: Negative.        Objective:      Physical Exam   Constitutional: She appears well-developed and well-nourished.   Cardiovascular: Normal rate and regular rhythm.   No murmur heard.  Pulmonary/Chest: Effort normal and breath sounds normal.       Assessment:       1. Preop examination    2. Hypertension associated with diabetes    3. Peripheral vascular disease, unspecified    4. Cervical stenosis of spine        Plan:       Preop examination  Comments:  Will do CBC, CMP and lipid  Orders:  -     CBC auto differential; Future; Expected date: 05/26/2020  -     Comprehensive metabolic panel; Future; Expected date: 05/26/2020  -     Urinalysis; Future; Expected date: 05/26/2020  -     Protime-INR; Future; Expected date: 05/26/2020  -     APTT; Future; Expected date: 05/26/2020  -     Culture, MRSA    Hypertension associated with diabetes  Comments:  Pt BP is well controlled    Peripheral vascular disease, unspecified  Comments:  Stable at this time    Cervical stenosis of spine  Comments:  Stable  Orders:  -     CBC auto differential; Future; Expected date: 05/26/2020  -     Comprehensive metabolic panel; Future; Expected date: 05/26/2020  -     Urinalysis; Future; Expected date: 05/26/2020  -     Protime-INR; Future; Expected date: 05/26/2020  -     APTT; Future; Expected date: 05/26/2020  -     Culture, MRSA

## 2020-05-26 NOTE — PATIENT INSTRUCTIONS
Are they or [facility] doing COVID testing 2 days prior to surgery. Which would be on the 4th of June.

## 2020-05-28 LAB — MRSA SPEC QL CULT: NORMAL

## 2020-07-17 DIAGNOSIS — E11.9 TYPE 2 DIABETES MELLITUS WITHOUT COMPLICATION: ICD-10-CM

## 2020-08-09 ENCOUNTER — PATIENT OUTREACH (OUTPATIENT)
Dept: ADMINISTRATIVE | Facility: OTHER | Age: 65
End: 2020-08-09

## 2020-08-09 NOTE — PROGRESS NOTES
Requested updates within Care Everywhere.  Patient's chart was reviewed for overdue CLARISA topics.  Immunizations reconciled.    Eye exam scheduled 8/10/20.

## 2020-08-10 ENCOUNTER — OFFICE VISIT (OUTPATIENT)
Dept: OPHTHALMOLOGY | Facility: CLINIC | Age: 65
End: 2020-08-10
Payer: MEDICARE

## 2020-08-10 DIAGNOSIS — H25.13 NUCLEAR SCLEROSIS, BILATERAL: ICD-10-CM

## 2020-08-10 DIAGNOSIS — H52.4 HYPEROPIA WITH PRESBYOPIA, BILATERAL: ICD-10-CM

## 2020-08-10 DIAGNOSIS — H52.03 HYPEROPIA WITH PRESBYOPIA, BILATERAL: ICD-10-CM

## 2020-08-10 DIAGNOSIS — H25.013 CORTICAL AGE-RELATED CATARACT OF BOTH EYES: ICD-10-CM

## 2020-08-10 DIAGNOSIS — E11.9 DIABETES MELLITUS TYPE 2 WITHOUT RETINOPATHY: Primary | ICD-10-CM

## 2020-08-10 PROCEDURE — 92014 PR EYE EXAM, EST PATIENT,COMPREHESV: ICD-10-PCS | Mod: S$GLB,,, | Performed by: OPTOMETRIST

## 2020-08-10 PROCEDURE — 92015 DETERMINE REFRACTIVE STATE: CPT | Mod: S$GLB,,, | Performed by: OPTOMETRIST

## 2020-08-10 PROCEDURE — 99999 PR PBB SHADOW E&M-EST. PATIENT-LVL III: CPT | Mod: PBBFAC,,, | Performed by: OPTOMETRIST

## 2020-08-10 PROCEDURE — 92015 PR REFRACTION: ICD-10-PCS | Mod: S$GLB,,, | Performed by: OPTOMETRIST

## 2020-08-10 PROCEDURE — 92014 COMPRE OPH EXAM EST PT 1/>: CPT | Mod: S$GLB,,, | Performed by: OPTOMETRIST

## 2020-08-10 PROCEDURE — 99999 PR PBB SHADOW E&M-EST. PATIENT-LVL III: ICD-10-PCS | Mod: PBBFAC,,, | Performed by: OPTOMETRIST

## 2020-08-10 NOTE — PROGRESS NOTES
HPI     Diabetic eye exam  Diagnosed with diabetes around 2011  Recent vision fluctuations no  Lab Results       Component                Value               Date                       HGBA1C                   7.2 (H)             07/15/2019                      Last edited by Alannah Patricia on 8/10/2020 10:32 AM. (History)            Assessment /Plan     For exam results, see Encounter Report.    Diabetes mellitus type 2 without retinopathy  No diabetic retinopathy in either eye  Continue close care with PCP   Monitor 12 months    Nuclear sclerosis, bilateral  Cortical age-related cataract of both eyes  Surgery is not indicated at this time.   Monitor 12 months.    Hyperopia with presbyopia, bilateral  Eyeglass Final Rx     Eyeglass Final Rx       Sphere Add    Right +2.75 +2.50    Left +2.50 +2.50    Expiration Date: 8/11/2021              RTC 1 yr for dilated eye exam or PRN if any problems.   Discussed above and answered questions.

## 2020-09-03 ENCOUNTER — PATIENT OUTREACH (OUTPATIENT)
Dept: ADMINISTRATIVE | Facility: HOSPITAL | Age: 65
End: 2020-09-03

## 2020-09-03 NOTE — PROGRESS NOTES
Pt scheduled  9/29/2020 for Diabetic Labs          Adelaida SYED LPN Care Coordinator  Care Coordination Department  [facility]  [phone]

## 2020-09-11 DIAGNOSIS — Z12.11 COLON CANCER SCREENING: ICD-10-CM

## 2020-09-30 ENCOUNTER — OFFICE VISIT (OUTPATIENT)
Dept: INTERNAL MEDICINE | Facility: CLINIC | Age: 65
End: 2020-09-30
Payer: MEDICARE

## 2020-09-30 ENCOUNTER — LAB VISIT (OUTPATIENT)
Dept: LAB | Facility: HOSPITAL | Age: 65
End: 2020-09-30
Attending: FAMILY MEDICINE
Payer: MEDICARE

## 2020-09-30 VITALS
DIASTOLIC BLOOD PRESSURE: 76 MMHG | TEMPERATURE: 97 F | HEIGHT: 63 IN | SYSTOLIC BLOOD PRESSURE: 128 MMHG | HEART RATE: 90 BPM | BODY MASS INDEX: 38.21 KG/M2 | WEIGHT: 215.63 LBS | OXYGEN SATURATION: 95 % | RESPIRATION RATE: 18 BRPM

## 2020-09-30 DIAGNOSIS — E11.9 TYPE 2 DIABETES MELLITUS WITHOUT COMPLICATION, WITHOUT LONG-TERM CURRENT USE OF INSULIN: Chronic | ICD-10-CM

## 2020-09-30 DIAGNOSIS — E11.59 HYPERTENSION ASSOCIATED WITH DIABETES: Chronic | ICD-10-CM

## 2020-09-30 DIAGNOSIS — E11.69 HYPERLIPIDEMIA ASSOCIATED WITH TYPE 2 DIABETES MELLITUS: Primary | Chronic | ICD-10-CM

## 2020-09-30 DIAGNOSIS — E11.9 TYPE 2 DIABETES MELLITUS WITHOUT COMPLICATION: ICD-10-CM

## 2020-09-30 DIAGNOSIS — I15.2 HYPERTENSION ASSOCIATED WITH DIABETES: Chronic | ICD-10-CM

## 2020-09-30 DIAGNOSIS — E78.5 HYPERLIPIDEMIA ASSOCIATED WITH TYPE 2 DIABETES MELLITUS: Primary | Chronic | ICD-10-CM

## 2020-09-30 LAB
ESTIMATED AVG GLUCOSE: 169 MG/DL (ref 68–131)
HBA1C MFR BLD HPLC: 7.5 % (ref 4–5.6)

## 2020-09-30 PROCEDURE — 3074F PR MOST RECENT SYSTOLIC BLOOD PRESSURE < 130 MM HG: ICD-10-PCS | Mod: CPTII,S$GLB,, | Performed by: FAMILY MEDICINE

## 2020-09-30 PROCEDURE — 99999 PR PBB SHADOW E&M-EST. PATIENT-LVL IV: CPT | Mod: PBBFAC,,, | Performed by: FAMILY MEDICINE

## 2020-09-30 PROCEDURE — 99214 PR OFFICE/OUTPT VISIT, EST, LEVL IV, 30-39 MIN: ICD-10-PCS | Mod: 25,S$GLB,, | Performed by: FAMILY MEDICINE

## 2020-09-30 PROCEDURE — 3078F PR MOST RECENT DIASTOLIC BLOOD PRESSURE < 80 MM HG: ICD-10-PCS | Mod: CPTII,S$GLB,, | Performed by: FAMILY MEDICINE

## 2020-09-30 PROCEDURE — 90694 FLU VACCINE - QUADRIVALENT - ADJUVANTED: ICD-10-PCS | Mod: S$GLB,,, | Performed by: FAMILY MEDICINE

## 2020-09-30 PROCEDURE — 36415 COLL VENOUS BLD VENIPUNCTURE: CPT

## 2020-09-30 PROCEDURE — 3078F DIAST BP <80 MM HG: CPT | Mod: CPTII,S$GLB,, | Performed by: FAMILY MEDICINE

## 2020-09-30 PROCEDURE — 99214 OFFICE O/P EST MOD 30 MIN: CPT | Mod: 25,S$GLB,, | Performed by: FAMILY MEDICINE

## 2020-09-30 PROCEDURE — 83036 HEMOGLOBIN GLYCOSYLATED A1C: CPT

## 2020-09-30 PROCEDURE — 99999 PR PBB SHADOW E&M-EST. PATIENT-LVL IV: ICD-10-PCS | Mod: PBBFAC,,, | Performed by: FAMILY MEDICINE

## 2020-09-30 PROCEDURE — 90694 VACC AIIV4 NO PRSRV 0.5ML IM: CPT | Mod: S$GLB,,, | Performed by: FAMILY MEDICINE

## 2020-09-30 PROCEDURE — G0008 ADMIN INFLUENZA VIRUS VAC: HCPCS | Mod: S$GLB,,, | Performed by: FAMILY MEDICINE

## 2020-09-30 PROCEDURE — 3008F PR BODY MASS INDEX (BMI) DOCUMENTED: ICD-10-PCS | Mod: CPTII,S$GLB,, | Performed by: FAMILY MEDICINE

## 2020-09-30 PROCEDURE — 3008F BODY MASS INDEX DOCD: CPT | Mod: CPTII,S$GLB,, | Performed by: FAMILY MEDICINE

## 2020-09-30 PROCEDURE — G0008 FLU VACCINE - QUADRIVALENT - ADJUVANTED: ICD-10-PCS | Mod: S$GLB,,, | Performed by: FAMILY MEDICINE

## 2020-09-30 PROCEDURE — 1159F PR MEDICATION LIST DOCUMENTED IN MEDICAL RECORD: ICD-10-PCS | Mod: S$GLB,,, | Performed by: FAMILY MEDICINE

## 2020-09-30 PROCEDURE — 3074F SYST BP LT 130 MM HG: CPT | Mod: CPTII,S$GLB,, | Performed by: FAMILY MEDICINE

## 2020-09-30 PROCEDURE — 1159F MED LIST DOCD IN RCRD: CPT | Mod: S$GLB,,, | Performed by: FAMILY MEDICINE

## 2020-09-30 RX ORDER — METFORMIN HYDROCHLORIDE 500 MG/1
500 TABLET ORAL
Qty: 90 TABLET | Refills: 2 | Status: SHIPPED | OUTPATIENT
Start: 2020-09-30 | End: 2021-03-29 | Stop reason: DRUGHIGH

## 2020-09-30 RX ORDER — PRAVASTATIN SODIUM 40 MG/1
40 TABLET ORAL DAILY
Qty: 90 TABLET | Refills: 2 | Status: SHIPPED | OUTPATIENT
Start: 2020-09-30 | End: 2021-03-29 | Stop reason: ALTCHOICE

## 2020-09-30 RX ORDER — CELECOXIB 200 MG/1
400 CAPSULE ORAL DAILY
Qty: 180 CAPSULE | Refills: 1 | Status: SHIPPED | OUTPATIENT
Start: 2020-09-30 | End: 2021-03-29 | Stop reason: SDUPTHER

## 2020-09-30 RX ORDER — AMLODIPINE BESYLATE AND BENAZEPRIL HYDROCHLORIDE 2.5; 1 MG/1; MG/1
1 CAPSULE ORAL DAILY
Qty: 90 CAPSULE | Refills: 1 | Status: SHIPPED | OUTPATIENT
Start: 2020-09-30 | End: 2021-03-29 | Stop reason: SDUPTHER

## 2020-09-30 NOTE — PROGRESS NOTES
Subjective:       Patient ID: May Montoya is a 65 y.o. female.    Chief Complaint: Follow-up    Pt is a 65 year old who is her follow-up. Pt is up-to-date on wellness. Pt a1c has been in the low 7. Pt BP is well controlled.     Review of Systems   Constitutional: Negative.  Negative for activity change, chills, fatigue and fever.   HENT: Negative.  Negative for nasal congestion, ear pain, postnasal drip, rhinorrhea, sinus pressure/congestion, sore throat and trouble swallowing.    Eyes: Negative for visual disturbance.   Respiratory: Negative.  Negative for cough, chest tightness, shortness of breath and wheezing.    Gastrointestinal: Negative.    Endocrine: Negative.  Negative for cold intolerance and heat intolerance.   Genitourinary: Negative.  Negative for dysuria, hematuria, urgency, vaginal bleeding and vaginal discharge.   Musculoskeletal: Negative for arthralgias, back pain, joint swelling and neck stiffness.   Integumentary:  Negative for color change and rash.   Neurological: Negative.  Negative for dizziness, tremors, seizures, syncope, weakness, light-headedness and headaches.   Hematological: Negative.    Psychiatric/Behavioral: Negative.  Negative for agitation, confusion, sleep disturbance and suicidal ideas. The patient is not nervous/anxious.          Objective:      Physical Exam  Vitals signs reviewed.   Constitutional:       Appearance: She is well-developed.   HENT:      Head: Normocephalic.   Eyes:      Pupils: Pupils are equal, round, and reactive to light.   Neck:      Musculoskeletal: Normal range of motion.      Thyroid: No thyromegaly.   Cardiovascular:      Rate and Rhythm: Normal rate and regular rhythm.      Heart sounds: No murmur.   Pulmonary:      Effort: Pulmonary effort is normal.      Breath sounds: Normal breath sounds.   Abdominal:      General: Bowel sounds are normal.      Palpations: Abdomen is soft.   Musculoskeletal: Normal range of motion.   Skin:     General: Skin is  warm.   Neurological:      Mental Status: She is alert and oriented to person, place, and time.      Deep Tendon Reflexes: Reflexes are normal and symmetric.   Psychiatric:         Behavior: Behavior normal.         Assessment:       1. Hyperlipidemia associated with type 2 diabetes mellitus    2. Type 2 diabetes mellitus without complication, without long-term current use of insulin    3. Hypertension associated with diabetes        Plan:       Hyperlipidemia associated with type 2 diabetes mellitus  Comments:  Will continue with pravachol  Orders:  -     Lipid Panel; Future; Expected date: 11/02/2020    Type 2 diabetes mellitus without complication, without long-term current use of insulin  Comments:  Will continue on the metformin  Orders:  -     metFORMIN (GLUCOPHAGE) 500 MG tablet; Take 1 tablet (500 mg total) by mouth daily with breakfast.  Dispense: 90 tablet; Refill: 2    Hypertension associated with diabetes  Comments:  Pt BP is well controlled  Orders:  -     amlodipine-benazepril 2.5-10 mg (LOTREL) 2.5-10 mg per capsule; Take 1 capsule by mouth once daily.  Dispense: 90 capsule; Refill: 1    Other orders  -     pravastatin (PRAVACHOL) 40 MG tablet; Take 1 tablet (40 mg total) by mouth once daily.  Dispense: 90 tablet; Refill: 2  -     celecoxib (CELEBREX) 200 MG capsule; Take 2 capsules (400 mg total) by mouth once daily.  Dispense: 180 capsule; Refill: 1

## 2020-10-30 ENCOUNTER — PATIENT MESSAGE (OUTPATIENT)
Dept: ADMINISTRATIVE | Facility: HOSPITAL | Age: 65
End: 2020-10-30

## 2020-11-02 ENCOUNTER — LAB VISIT (OUTPATIENT)
Dept: LAB | Facility: HOSPITAL | Age: 65
End: 2020-11-02
Attending: FAMILY MEDICINE
Payer: MEDICARE

## 2020-11-02 DIAGNOSIS — E11.69 HYPERLIPIDEMIA ASSOCIATED WITH TYPE 2 DIABETES MELLITUS: Chronic | ICD-10-CM

## 2020-11-02 DIAGNOSIS — E78.5 HYPERLIPIDEMIA ASSOCIATED WITH TYPE 2 DIABETES MELLITUS: Chronic | ICD-10-CM

## 2020-11-02 PROCEDURE — 36415 COLL VENOUS BLD VENIPUNCTURE: CPT

## 2020-11-02 PROCEDURE — 80061 LIPID PANEL: CPT

## 2020-11-03 LAB
CHOLEST SERPL-MCNC: 200 MG/DL (ref 120–199)
CHOLEST/HDLC SERPL: 3.9 {RATIO} (ref 2–5)
HDLC SERPL-MCNC: 51 MG/DL (ref 40–75)
HDLC SERPL: 25.5 % (ref 20–50)
LDLC SERPL CALC-MCNC: 120.4 MG/DL (ref 63–159)
NONHDLC SERPL-MCNC: 149 MG/DL
TRIGL SERPL-MCNC: 143 MG/DL (ref 30–150)

## 2021-03-29 ENCOUNTER — OFFICE VISIT (OUTPATIENT)
Dept: INTERNAL MEDICINE | Facility: CLINIC | Age: 66
End: 2021-03-29
Payer: MEDICARE

## 2021-03-29 ENCOUNTER — LAB VISIT (OUTPATIENT)
Dept: LAB | Facility: HOSPITAL | Age: 66
End: 2021-03-29
Attending: FAMILY MEDICINE
Payer: MEDICARE

## 2021-03-29 VITALS
OXYGEN SATURATION: 97 % | BODY MASS INDEX: 37.65 KG/M2 | TEMPERATURE: 98 F | DIASTOLIC BLOOD PRESSURE: 70 MMHG | HEIGHT: 63 IN | SYSTOLIC BLOOD PRESSURE: 100 MMHG | WEIGHT: 212.5 LBS | HEART RATE: 78 BPM

## 2021-03-29 DIAGNOSIS — E11.59 HYPERTENSION ASSOCIATED WITH DIABETES: Chronic | ICD-10-CM

## 2021-03-29 DIAGNOSIS — Z12.11 SCREENING FOR COLORECTAL CANCER: ICD-10-CM

## 2021-03-29 DIAGNOSIS — M15.9 GENERALIZED OSTEOARTHRITIS OF MULTIPLE SITES: Chronic | ICD-10-CM

## 2021-03-29 DIAGNOSIS — E11.59 HYPERTENSION ASSOCIATED WITH DIABETES: Primary | ICD-10-CM

## 2021-03-29 DIAGNOSIS — E11.69 TYPE 2 DIABETES MELLITUS WITH OTHER SPECIFIED COMPLICATION, WITHOUT LONG-TERM CURRENT USE OF INSULIN: Chronic | ICD-10-CM

## 2021-03-29 DIAGNOSIS — Z12.12 SCREENING FOR COLORECTAL CANCER: ICD-10-CM

## 2021-03-29 DIAGNOSIS — I15.2 HYPERTENSION ASSOCIATED WITH DIABETES: Primary | ICD-10-CM

## 2021-03-29 DIAGNOSIS — E11.42 TYPE 2 DIABETES MELLITUS WITH DIABETIC POLYNEUROPATHY, WITHOUT LONG-TERM CURRENT USE OF INSULIN: Chronic | ICD-10-CM

## 2021-03-29 DIAGNOSIS — I15.2 HYPERTENSION ASSOCIATED WITH DIABETES: Chronic | ICD-10-CM

## 2021-03-29 DIAGNOSIS — I87.303 CHRONIC VENOUS HYPERTENSION INVOLVING BOTH SIDES: Chronic | ICD-10-CM

## 2021-03-29 DIAGNOSIS — E11.69 HYPERLIPIDEMIA ASSOCIATED WITH TYPE 2 DIABETES MELLITUS: Chronic | ICD-10-CM

## 2021-03-29 DIAGNOSIS — I73.9 PERIPHERAL VASCULAR DISEASE, UNSPECIFIED: ICD-10-CM

## 2021-03-29 DIAGNOSIS — E78.5 HYPERLIPIDEMIA ASSOCIATED WITH TYPE 2 DIABETES MELLITUS: Chronic | ICD-10-CM

## 2021-03-29 PROBLEM — R42 DIZZINESS: Status: RESOLVED | Noted: 2019-12-24 | Resolved: 2021-03-29

## 2021-03-29 PROBLEM — R11.2 INTRACTABLE VOMITING WITH NAUSEA: Status: RESOLVED | Noted: 2019-12-24 | Resolved: 2021-03-29

## 2021-03-29 LAB
ESTIMATED AVG GLUCOSE: 177 MG/DL (ref 68–131)
HBA1C MFR BLD: 7.8 % (ref 4–5.6)

## 2021-03-29 PROCEDURE — 1126F AMNT PAIN NOTED NONE PRSNT: CPT | Mod: S$GLB,,, | Performed by: FAMILY MEDICINE

## 2021-03-29 PROCEDURE — 99999 PR PBB SHADOW E&M-EST. PATIENT-LVL V: CPT | Mod: PBBFAC,,, | Performed by: FAMILY MEDICINE

## 2021-03-29 PROCEDURE — 3074F SYST BP LT 130 MM HG: CPT | Mod: CPTII,S$GLB,, | Performed by: FAMILY MEDICINE

## 2021-03-29 PROCEDURE — 3051F HG A1C>EQUAL 7.0%<8.0%: CPT | Mod: CPTII,S$GLB,, | Performed by: FAMILY MEDICINE

## 2021-03-29 PROCEDURE — 3072F PR LOW RISK FOR RETINOPATHY: ICD-10-PCS | Mod: S$GLB,,, | Performed by: FAMILY MEDICINE

## 2021-03-29 PROCEDURE — 1101F PT FALLS ASSESS-DOCD LE1/YR: CPT | Mod: CPTII,S$GLB,, | Performed by: FAMILY MEDICINE

## 2021-03-29 PROCEDURE — 99214 PR OFFICE/OUTPT VISIT, EST, LEVL IV, 30-39 MIN: ICD-10-PCS | Mod: S$GLB,,, | Performed by: FAMILY MEDICINE

## 2021-03-29 PROCEDURE — 3288F FALL RISK ASSESSMENT DOCD: CPT | Mod: CPTII,S$GLB,, | Performed by: FAMILY MEDICINE

## 2021-03-29 PROCEDURE — 99999 PR PBB SHADOW E&M-EST. PATIENT-LVL V: ICD-10-PCS | Mod: PBBFAC,,, | Performed by: FAMILY MEDICINE

## 2021-03-29 PROCEDURE — 3008F PR BODY MASS INDEX (BMI) DOCUMENTED: ICD-10-PCS | Mod: CPTII,S$GLB,, | Performed by: FAMILY MEDICINE

## 2021-03-29 PROCEDURE — 36415 COLL VENOUS BLD VENIPUNCTURE: CPT | Performed by: FAMILY MEDICINE

## 2021-03-29 PROCEDURE — 3078F DIAST BP <80 MM HG: CPT | Mod: CPTII,S$GLB,, | Performed by: FAMILY MEDICINE

## 2021-03-29 PROCEDURE — 3074F PR MOST RECENT SYSTOLIC BLOOD PRESSURE < 130 MM HG: ICD-10-PCS | Mod: CPTII,S$GLB,, | Performed by: FAMILY MEDICINE

## 2021-03-29 PROCEDURE — 99214 OFFICE O/P EST MOD 30 MIN: CPT | Mod: S$GLB,,, | Performed by: FAMILY MEDICINE

## 2021-03-29 PROCEDURE — 3051F PR MOST RECENT HEMOGLOBIN A1C LEVEL 7.0 - < 8.0%: ICD-10-PCS | Mod: CPTII,S$GLB,, | Performed by: FAMILY MEDICINE

## 2021-03-29 PROCEDURE — 1101F PR PT FALLS ASSESS DOC 0-1 FALLS W/OUT INJ PAST YR: ICD-10-PCS | Mod: CPTII,S$GLB,, | Performed by: FAMILY MEDICINE

## 2021-03-29 PROCEDURE — 3072F LOW RISK FOR RETINOPATHY: CPT | Mod: S$GLB,,, | Performed by: FAMILY MEDICINE

## 2021-03-29 PROCEDURE — 3078F PR MOST RECENT DIASTOLIC BLOOD PRESSURE < 80 MM HG: ICD-10-PCS | Mod: CPTII,S$GLB,, | Performed by: FAMILY MEDICINE

## 2021-03-29 PROCEDURE — 1159F MED LIST DOCD IN RCRD: CPT | Mod: S$GLB,,, | Performed by: FAMILY MEDICINE

## 2021-03-29 PROCEDURE — 1159F PR MEDICATION LIST DOCUMENTED IN MEDICAL RECORD: ICD-10-PCS | Mod: S$GLB,,, | Performed by: FAMILY MEDICINE

## 2021-03-29 PROCEDURE — 3008F BODY MASS INDEX DOCD: CPT | Mod: CPTII,S$GLB,, | Performed by: FAMILY MEDICINE

## 2021-03-29 PROCEDURE — 83036 HEMOGLOBIN GLYCOSYLATED A1C: CPT | Performed by: FAMILY MEDICINE

## 2021-03-29 PROCEDURE — 3288F PR FALLS RISK ASSESSMENT DOCUMENTED: ICD-10-PCS | Mod: CPTII,S$GLB,, | Performed by: FAMILY MEDICINE

## 2021-03-29 PROCEDURE — 1126F PR PAIN SEVERITY QUANTIFIED, NO PAIN PRESENT: ICD-10-PCS | Mod: S$GLB,,, | Performed by: FAMILY MEDICINE

## 2021-03-29 RX ORDER — AMLODIPINE BESYLATE AND BENAZEPRIL HYDROCHLORIDE 2.5; 1 MG/1; MG/1
1 CAPSULE ORAL DAILY
Qty: 90 CAPSULE | Refills: 1 | Status: SHIPPED | OUTPATIENT
Start: 2021-03-29 | End: 2021-10-04 | Stop reason: SDUPTHER

## 2021-03-29 RX ORDER — METFORMIN HYDROCHLORIDE 500 MG/1
1000 TABLET ORAL 2 TIMES DAILY WITH MEALS
Qty: 360 TABLET | Refills: 4 | Status: SHIPPED | OUTPATIENT
Start: 2021-04-29 | End: 2022-02-02 | Stop reason: SDUPTHER

## 2021-03-29 RX ORDER — METFORMIN HYDROCHLORIDE 500 MG/1
TABLET ORAL
Qty: 92 TABLET | Refills: 0 | Status: SHIPPED | OUTPATIENT
Start: 2021-03-29 | End: 2021-04-28

## 2021-03-29 RX ORDER — CELECOXIB 200 MG/1
200 CAPSULE ORAL 2 TIMES DAILY
Qty: 180 CAPSULE | Refills: 0 | Status: SHIPPED | OUTPATIENT
Start: 2021-03-29 | End: 2021-04-13 | Stop reason: SDUPTHER

## 2021-03-29 RX ORDER — ATORVASTATIN CALCIUM 40 MG/1
40 TABLET, FILM COATED ORAL NIGHTLY
Qty: 90 TABLET | Refills: 3 | Status: SHIPPED | OUTPATIENT
Start: 2021-03-29 | End: 2022-02-02 | Stop reason: SDUPTHER

## 2021-03-30 ENCOUNTER — TELEPHONE (OUTPATIENT)
Dept: DIABETES | Facility: CLINIC | Age: 66
End: 2021-03-30

## 2021-03-31 ENCOUNTER — TELEPHONE (OUTPATIENT)
Dept: INTERNAL MEDICINE | Facility: CLINIC | Age: 66
End: 2021-03-31

## 2021-03-31 DIAGNOSIS — E11.69 TYPE 2 DIABETES MELLITUS WITH OTHER SPECIFIED COMPLICATION, WITHOUT LONG-TERM CURRENT USE OF INSULIN: Primary | ICD-10-CM

## 2021-04-08 ENCOUNTER — PATIENT OUTREACH (OUTPATIENT)
Dept: ADMINISTRATIVE | Facility: HOSPITAL | Age: 66
End: 2021-04-08

## 2021-04-08 ENCOUNTER — PATIENT OUTREACH (OUTPATIENT)
Dept: ADMINISTRATIVE | Facility: OTHER | Age: 66
End: 2021-04-08

## 2021-04-12 ENCOUNTER — OFFICE VISIT (OUTPATIENT)
Dept: PODIATRY | Facility: CLINIC | Age: 66
End: 2021-04-12
Payer: MEDICARE

## 2021-04-12 VITALS
SYSTOLIC BLOOD PRESSURE: 143 MMHG | BODY MASS INDEX: 37.56 KG/M2 | DIASTOLIC BLOOD PRESSURE: 91 MMHG | WEIGHT: 212 LBS | HEIGHT: 63 IN | HEART RATE: 82 BPM

## 2021-04-12 DIAGNOSIS — E11.42 TYPE 2 DIABETES MELLITUS WITH DIABETIC POLYNEUROPATHY, WITHOUT LONG-TERM CURRENT USE OF INSULIN: Primary | Chronic | ICD-10-CM

## 2021-04-12 DIAGNOSIS — M21.42 PES PLANUS OF BOTH FEET: ICD-10-CM

## 2021-04-12 DIAGNOSIS — I73.9 PERIPHERAL VASCULAR DISEASE, UNSPECIFIED: ICD-10-CM

## 2021-04-12 DIAGNOSIS — M21.41 PES PLANUS OF BOTH FEET: ICD-10-CM

## 2021-04-12 PROCEDURE — 1159F MED LIST DOCD IN RCRD: CPT | Mod: S$GLB,,, | Performed by: PODIATRIST

## 2021-04-12 PROCEDURE — 3051F HG A1C>EQUAL 7.0%<8.0%: CPT | Mod: CPTII,S$GLB,, | Performed by: PODIATRIST

## 2021-04-12 PROCEDURE — 99999 PR PBB SHADOW E&M-EST. PATIENT-LVL III: CPT | Mod: PBBFAC,,, | Performed by: PODIATRIST

## 2021-04-12 PROCEDURE — 1101F PT FALLS ASSESS-DOCD LE1/YR: CPT | Mod: CPTII,S$GLB,, | Performed by: PODIATRIST

## 2021-04-12 PROCEDURE — 1101F PR PT FALLS ASSESS DOC 0-1 FALLS W/OUT INJ PAST YR: ICD-10-PCS | Mod: CPTII,S$GLB,, | Performed by: PODIATRIST

## 2021-04-12 PROCEDURE — 99999 PR PBB SHADOW E&M-EST. PATIENT-LVL III: ICD-10-PCS | Mod: PBBFAC,,, | Performed by: PODIATRIST

## 2021-04-12 PROCEDURE — 3288F FALL RISK ASSESSMENT DOCD: CPT | Mod: CPTII,S$GLB,, | Performed by: PODIATRIST

## 2021-04-12 PROCEDURE — 99204 OFFICE O/P NEW MOD 45 MIN: CPT | Mod: 25,S$GLB,, | Performed by: PODIATRIST

## 2021-04-12 PROCEDURE — 3072F PR LOW RISK FOR RETINOPATHY: ICD-10-PCS | Mod: S$GLB,,, | Performed by: PODIATRIST

## 2021-04-12 PROCEDURE — 99204 PR OFFICE/OUTPT VISIT, NEW, LEVL IV, 45-59 MIN: ICD-10-PCS | Mod: 25,S$GLB,, | Performed by: PODIATRIST

## 2021-04-12 PROCEDURE — 3051F PR MOST RECENT HEMOGLOBIN A1C LEVEL 7.0 - < 8.0%: ICD-10-PCS | Mod: CPTII,S$GLB,, | Performed by: PODIATRIST

## 2021-04-12 PROCEDURE — 3008F PR BODY MASS INDEX (BMI) DOCUMENTED: ICD-10-PCS | Mod: CPTII,S$GLB,, | Performed by: PODIATRIST

## 2021-04-12 PROCEDURE — 1126F AMNT PAIN NOTED NONE PRSNT: CPT | Mod: S$GLB,,, | Performed by: PODIATRIST

## 2021-04-12 PROCEDURE — 1126F PR PAIN SEVERITY QUANTIFIED, NO PAIN PRESENT: ICD-10-PCS | Mod: S$GLB,,, | Performed by: PODIATRIST

## 2021-04-12 PROCEDURE — 3072F LOW RISK FOR RETINOPATHY: CPT | Mod: S$GLB,,, | Performed by: PODIATRIST

## 2021-04-12 PROCEDURE — 1159F PR MEDICATION LIST DOCUMENTED IN MEDICAL RECORD: ICD-10-PCS | Mod: S$GLB,,, | Performed by: PODIATRIST

## 2021-04-12 PROCEDURE — 3008F BODY MASS INDEX DOCD: CPT | Mod: CPTII,S$GLB,, | Performed by: PODIATRIST

## 2021-04-12 PROCEDURE — 3288F PR FALLS RISK ASSESSMENT DOCUMENTED: ICD-10-PCS | Mod: CPTII,S$GLB,, | Performed by: PODIATRIST

## 2021-04-13 ENCOUNTER — TELEPHONE (OUTPATIENT)
Dept: INTERNAL MEDICINE | Facility: CLINIC | Age: 66
End: 2021-04-13

## 2021-04-13 DIAGNOSIS — M15.9 GENERALIZED OSTEOARTHRITIS OF MULTIPLE SITES: Chronic | ICD-10-CM

## 2021-04-13 RX ORDER — CELECOXIB 200 MG/1
200 CAPSULE ORAL DAILY PRN
Qty: 90 CAPSULE | Refills: 0 | Status: SHIPPED | OUTPATIENT
Start: 2021-04-13 | End: 2021-06-15 | Stop reason: SDUPTHER

## 2021-04-19 LAB — NONINV COLON CA DNA+OCC BLD SCRN STL QL: NEGATIVE

## 2021-04-23 ENCOUNTER — TELEPHONE (OUTPATIENT)
Dept: FAMILY MEDICINE | Facility: CLINIC | Age: 66
End: 2021-04-23

## 2021-04-26 ENCOUNTER — TELEPHONE (OUTPATIENT)
Dept: INTERNAL MEDICINE | Facility: CLINIC | Age: 66
End: 2021-04-26

## 2021-05-03 ENCOUNTER — TELEPHONE (OUTPATIENT)
Dept: INTERNAL MEDICINE | Facility: CLINIC | Age: 66
End: 2021-05-03

## 2021-05-12 ENCOUNTER — TELEPHONE (OUTPATIENT)
Dept: ADMINISTRATIVE | Facility: HOSPITAL | Age: 66
End: 2021-05-12

## 2021-06-14 ENCOUNTER — TELEPHONE (OUTPATIENT)
Dept: RHEUMATOLOGY | Facility: CLINIC | Age: 66
End: 2021-06-14

## 2021-06-14 ENCOUNTER — CLINICAL SUPPORT (OUTPATIENT)
Dept: DIABETES | Facility: CLINIC | Age: 66
End: 2021-06-14
Payer: MEDICARE

## 2021-06-14 VITALS — BODY MASS INDEX: 37.46 KG/M2 | HEIGHT: 63 IN | WEIGHT: 211.44 LBS

## 2021-06-14 DIAGNOSIS — E11.42 TYPE 2 DIABETES MELLITUS WITH DIABETIC POLYNEUROPATHY, WITHOUT LONG-TERM CURRENT USE OF INSULIN: Chronic | ICD-10-CM

## 2021-06-14 DIAGNOSIS — E11.69 TYPE 2 DIABETES MELLITUS WITH OTHER SPECIFIED COMPLICATION, WITHOUT LONG-TERM CURRENT USE OF INSULIN: Chronic | ICD-10-CM

## 2021-06-14 PROCEDURE — G0108 DIAB MANAGE TRN  PER INDIV: HCPCS | Mod: S$GLB,,, | Performed by: DIETITIAN, REGISTERED

## 2021-06-14 PROCEDURE — 99999 PR PBB SHADOW E&M-EST. PATIENT-LVL III: CPT | Mod: PBBFAC,,, | Performed by: DIETITIAN, REGISTERED

## 2021-06-14 PROCEDURE — 99999 PR PBB SHADOW E&M-EST. PATIENT-LVL III: ICD-10-PCS | Mod: PBBFAC,,, | Performed by: DIETITIAN, REGISTERED

## 2021-06-14 PROCEDURE — G0108 PR DIAB MANAGE TRN  PER INDIV: ICD-10-PCS | Mod: S$GLB,,, | Performed by: DIETITIAN, REGISTERED

## 2021-06-14 RX ORDER — LANCETS
EACH MISCELLANEOUS
Qty: 200 EACH | Refills: 11 | Status: SHIPPED | OUTPATIENT
Start: 2021-06-14 | End: 2022-02-02 | Stop reason: SDUPTHER

## 2021-06-14 RX ORDER — INSULIN PUMP SYRINGE, 3 ML
EACH MISCELLANEOUS
Qty: 1 EACH | Refills: 0 | Status: SHIPPED | OUTPATIENT
Start: 2021-06-14 | End: 2021-12-05 | Stop reason: SDUPTHER

## 2021-06-15 ENCOUNTER — OFFICE VISIT (OUTPATIENT)
Dept: RHEUMATOLOGY | Facility: CLINIC | Age: 66
End: 2021-06-15
Payer: MEDICARE

## 2021-06-15 ENCOUNTER — HOSPITAL ENCOUNTER (OUTPATIENT)
Dept: RADIOLOGY | Facility: HOSPITAL | Age: 66
Discharge: HOME | End: 2021-06-15
Attending: INTERNAL MEDICINE
Payer: MEDICARE

## 2021-06-15 VITALS
BODY MASS INDEX: 37.5 KG/M2 | SYSTOLIC BLOOD PRESSURE: 121 MMHG | HEIGHT: 63 IN | DIASTOLIC BLOOD PRESSURE: 78 MMHG | WEIGHT: 211.63 LBS | HEART RATE: 92 BPM

## 2021-06-15 DIAGNOSIS — M19.041 PRIMARY OSTEOARTHRITIS OF BOTH HANDS: Primary | ICD-10-CM

## 2021-06-15 DIAGNOSIS — M19.042 PRIMARY OSTEOARTHRITIS OF BOTH HANDS: ICD-10-CM

## 2021-06-15 DIAGNOSIS — M19.042 PRIMARY OSTEOARTHRITIS OF BOTH HANDS: Primary | ICD-10-CM

## 2021-06-15 DIAGNOSIS — M15.9 GENERALIZED OSTEOARTHRITIS OF MULTIPLE SITES: Chronic | ICD-10-CM

## 2021-06-15 DIAGNOSIS — M19.041 PRIMARY OSTEOARTHRITIS OF BOTH HANDS: ICD-10-CM

## 2021-06-15 PROCEDURE — 1126F PR PAIN SEVERITY QUANTIFIED, NO PAIN PRESENT: ICD-10-PCS | Mod: S$GLB,,, | Performed by: INTERNAL MEDICINE

## 2021-06-15 PROCEDURE — 1101F PT FALLS ASSESS-DOCD LE1/YR: CPT | Mod: CPTII,S$GLB,, | Performed by: INTERNAL MEDICINE

## 2021-06-15 PROCEDURE — 3288F FALL RISK ASSESSMENT DOCD: CPT | Mod: CPTII,S$GLB,, | Performed by: INTERNAL MEDICINE

## 2021-06-15 PROCEDURE — 1159F PR MEDICATION LIST DOCUMENTED IN MEDICAL RECORD: ICD-10-PCS | Mod: S$GLB,,, | Performed by: INTERNAL MEDICINE

## 2021-06-15 PROCEDURE — 99999 PR PBB SHADOW E&M-EST. PATIENT-LVL V: ICD-10-PCS | Mod: PBBFAC,,, | Performed by: INTERNAL MEDICINE

## 2021-06-15 PROCEDURE — 99205 PR OFFICE/OUTPT VISIT, NEW, LEVL V, 60-74 MIN: ICD-10-PCS | Mod: S$GLB,,, | Performed by: INTERNAL MEDICINE

## 2021-06-15 PROCEDURE — 3072F PR LOW RISK FOR RETINOPATHY: ICD-10-PCS | Mod: S$GLB,,, | Performed by: INTERNAL MEDICINE

## 2021-06-15 PROCEDURE — 99999 PR PBB SHADOW E&M-EST. PATIENT-LVL V: CPT | Mod: PBBFAC,,, | Performed by: INTERNAL MEDICINE

## 2021-06-15 PROCEDURE — 73130 XR HAND COMPLETE 3 VIEWS BILATERAL: ICD-10-PCS | Mod: 26,50,, | Performed by: RADIOLOGY

## 2021-06-15 PROCEDURE — 1159F MED LIST DOCD IN RCRD: CPT | Mod: S$GLB,,, | Performed by: INTERNAL MEDICINE

## 2021-06-15 PROCEDURE — 3288F PR FALLS RISK ASSESSMENT DOCUMENTED: ICD-10-PCS | Mod: CPTII,S$GLB,, | Performed by: INTERNAL MEDICINE

## 2021-06-15 PROCEDURE — 73130 X-RAY EXAM OF HAND: CPT | Mod: 26,50,, | Performed by: RADIOLOGY

## 2021-06-15 PROCEDURE — 1126F AMNT PAIN NOTED NONE PRSNT: CPT | Mod: S$GLB,,, | Performed by: INTERNAL MEDICINE

## 2021-06-15 PROCEDURE — 3072F LOW RISK FOR RETINOPATHY: CPT | Mod: S$GLB,,, | Performed by: INTERNAL MEDICINE

## 2021-06-15 PROCEDURE — 99205 OFFICE O/P NEW HI 60 MIN: CPT | Mod: S$GLB,,, | Performed by: INTERNAL MEDICINE

## 2021-06-15 PROCEDURE — 3008F PR BODY MASS INDEX (BMI) DOCUMENTED: ICD-10-PCS | Mod: CPTII,S$GLB,, | Performed by: INTERNAL MEDICINE

## 2021-06-15 PROCEDURE — 73130 X-RAY EXAM OF HAND: CPT | Mod: TC,50

## 2021-06-15 PROCEDURE — 3008F BODY MASS INDEX DOCD: CPT | Mod: CPTII,S$GLB,, | Performed by: INTERNAL MEDICINE

## 2021-06-15 PROCEDURE — 1101F PR PT FALLS ASSESS DOC 0-1 FALLS W/OUT INJ PAST YR: ICD-10-PCS | Mod: CPTII,S$GLB,, | Performed by: INTERNAL MEDICINE

## 2021-06-15 RX ORDER — CELECOXIB 200 MG/1
200 CAPSULE ORAL 2 TIMES DAILY
Qty: 180 CAPSULE | Refills: 1 | Status: SHIPPED | OUTPATIENT
Start: 2021-06-15 | End: 2021-09-13

## 2021-06-15 RX ORDER — COVID-19 MOLECULAR TEST ASSAY
KIT MISCELLANEOUS
COMMUNITY
Start: 2021-04-18 | End: 2021-12-13

## 2021-06-15 RX ORDER — PRAVASTATIN SODIUM 80 MG/1
80 TABLET ORAL NIGHTLY
COMMUNITY
Start: 2021-02-26 | End: 2021-06-28

## 2021-06-17 ENCOUNTER — TELEPHONE (OUTPATIENT)
Dept: RHEUMATOLOGY | Facility: CLINIC | Age: 66
End: 2021-06-17

## 2021-06-17 ENCOUNTER — PATIENT MESSAGE (OUTPATIENT)
Dept: RHEUMATOLOGY | Facility: CLINIC | Age: 66
End: 2021-06-17

## 2021-06-17 DIAGNOSIS — M06.00 SERONEGATIVE RHEUMATOID ARTHRITIS: Primary | ICD-10-CM

## 2021-06-17 RX ORDER — METHOTREXATE 2.5 MG/1
10 TABLET ORAL
Qty: 16 TABLET | Refills: 0 | Status: SHIPPED | OUTPATIENT
Start: 2021-06-17 | End: 2021-12-13 | Stop reason: SDUPTHER

## 2021-06-17 RX ORDER — FOLIC ACID 1 MG/1
1 TABLET ORAL DAILY
Qty: 90 TABLET | Refills: 2 | Status: SHIPPED | OUTPATIENT
Start: 2021-06-17 | End: 2021-12-13 | Stop reason: SDUPTHER

## 2021-06-18 ENCOUNTER — TELEPHONE (OUTPATIENT)
Dept: RHEUMATOLOGY | Facility: CLINIC | Age: 66
End: 2021-06-18

## 2021-06-21 ENCOUNTER — OFFICE VISIT (OUTPATIENT)
Dept: PODIATRY | Facility: CLINIC | Age: 66
End: 2021-06-21
Payer: MEDICARE

## 2021-06-21 ENCOUNTER — HOSPITAL ENCOUNTER (OUTPATIENT)
Dept: RADIOLOGY | Facility: HOSPITAL | Age: 66
Discharge: HOME | End: 2021-06-21
Attending: PODIATRIST
Payer: MEDICARE

## 2021-06-21 VITALS
BODY MASS INDEX: 37.39 KG/M2 | HEIGHT: 63 IN | HEART RATE: 93 BPM | WEIGHT: 211 LBS | SYSTOLIC BLOOD PRESSURE: 156 MMHG | DIASTOLIC BLOOD PRESSURE: 72 MMHG

## 2021-06-21 DIAGNOSIS — S96.912A LEFT ANKLE STRAIN, INITIAL ENCOUNTER: ICD-10-CM

## 2021-06-21 DIAGNOSIS — S96.912A LEFT ANKLE STRAIN, INITIAL ENCOUNTER: Primary | ICD-10-CM

## 2021-06-21 DIAGNOSIS — M25.572 ACUTE LEFT ANKLE PAIN: ICD-10-CM

## 2021-06-21 PROCEDURE — 73630 X-RAY EXAM OF FOOT: CPT | Mod: TC,LT

## 2021-06-21 PROCEDURE — 73610 XR ANKLE COMPLETE 3 VIEW LEFT: ICD-10-PCS | Mod: 26,LT,, | Performed by: RADIOLOGY

## 2021-06-21 PROCEDURE — 99999 PR PBB SHADOW E&M-EST. PATIENT-LVL IV: ICD-10-PCS | Mod: PBBFAC,,, | Performed by: PODIATRIST

## 2021-06-21 PROCEDURE — 1101F PR PT FALLS ASSESS DOC 0-1 FALLS W/OUT INJ PAST YR: ICD-10-PCS | Mod: CPTII,S$GLB,, | Performed by: PODIATRIST

## 2021-06-21 PROCEDURE — 1159F PR MEDICATION LIST DOCUMENTED IN MEDICAL RECORD: ICD-10-PCS | Mod: S$GLB,,, | Performed by: PODIATRIST

## 2021-06-21 PROCEDURE — 1101F PT FALLS ASSESS-DOCD LE1/YR: CPT | Mod: CPTII,S$GLB,, | Performed by: PODIATRIST

## 2021-06-21 PROCEDURE — 1125F AMNT PAIN NOTED PAIN PRSNT: CPT | Mod: S$GLB,,, | Performed by: PODIATRIST

## 2021-06-21 PROCEDURE — 3288F FALL RISK ASSESSMENT DOCD: CPT | Mod: CPTII,S$GLB,, | Performed by: PODIATRIST

## 2021-06-21 PROCEDURE — 99213 PR OFFICE/OUTPT VISIT, EST, LEVL III, 20-29 MIN: ICD-10-PCS | Mod: S$GLB,,, | Performed by: PODIATRIST

## 2021-06-21 PROCEDURE — 99999 PR PBB SHADOW E&M-EST. PATIENT-LVL IV: CPT | Mod: PBBFAC,,, | Performed by: PODIATRIST

## 2021-06-21 PROCEDURE — 73610 X-RAY EXAM OF ANKLE: CPT | Mod: TC,LT

## 2021-06-21 PROCEDURE — 3008F PR BODY MASS INDEX (BMI) DOCUMENTED: ICD-10-PCS | Mod: CPTII,S$GLB,, | Performed by: PODIATRIST

## 2021-06-21 PROCEDURE — 3008F BODY MASS INDEX DOCD: CPT | Mod: CPTII,S$GLB,, | Performed by: PODIATRIST

## 2021-06-21 PROCEDURE — 3072F LOW RISK FOR RETINOPATHY: CPT | Mod: S$GLB,,, | Performed by: PODIATRIST

## 2021-06-21 PROCEDURE — 1159F MED LIST DOCD IN RCRD: CPT | Mod: S$GLB,,, | Performed by: PODIATRIST

## 2021-06-21 PROCEDURE — 73630 XR FOOT COMPLETE 3 VIEW LEFT: ICD-10-PCS | Mod: 26,LT,, | Performed by: RADIOLOGY

## 2021-06-21 PROCEDURE — 1125F PR PAIN SEVERITY QUANTIFIED, PAIN PRESENT: ICD-10-PCS | Mod: S$GLB,,, | Performed by: PODIATRIST

## 2021-06-21 PROCEDURE — 3072F PR LOW RISK FOR RETINOPATHY: ICD-10-PCS | Mod: S$GLB,,, | Performed by: PODIATRIST

## 2021-06-21 PROCEDURE — 99213 OFFICE O/P EST LOW 20 MIN: CPT | Mod: S$GLB,,, | Performed by: PODIATRIST

## 2021-06-21 PROCEDURE — 3288F PR FALLS RISK ASSESSMENT DOCUMENTED: ICD-10-PCS | Mod: CPTII,S$GLB,, | Performed by: PODIATRIST

## 2021-06-21 PROCEDURE — 73610 X-RAY EXAM OF ANKLE: CPT | Mod: 26,LT,, | Performed by: RADIOLOGY

## 2021-06-21 PROCEDURE — 73630 X-RAY EXAM OF FOOT: CPT | Mod: 26,LT,, | Performed by: RADIOLOGY

## 2021-06-28 ENCOUNTER — OFFICE VISIT (OUTPATIENT)
Dept: INTERNAL MEDICINE | Facility: CLINIC | Age: 66
End: 2021-06-28
Payer: MEDICARE

## 2021-06-28 VITALS
HEART RATE: 95 BPM | HEIGHT: 63 IN | TEMPERATURE: 98 F | BODY MASS INDEX: 37.07 KG/M2 | OXYGEN SATURATION: 95 % | WEIGHT: 209.19 LBS | SYSTOLIC BLOOD PRESSURE: 130 MMHG | DIASTOLIC BLOOD PRESSURE: 70 MMHG

## 2021-06-28 DIAGNOSIS — E66.01 CLASS 2 SEVERE OBESITY DUE TO EXCESS CALORIES WITH SERIOUS COMORBIDITY AND BODY MASS INDEX (BMI) OF 37.0 TO 37.9 IN ADULT: Chronic | ICD-10-CM

## 2021-06-28 DIAGNOSIS — I15.2 HYPERTENSION ASSOCIATED WITH DIABETES: Chronic | ICD-10-CM

## 2021-06-28 DIAGNOSIS — E11.65 TYPE 2 DIABETES MELLITUS WITH HYPERGLYCEMIA, WITHOUT LONG-TERM CURRENT USE OF INSULIN: Chronic | ICD-10-CM

## 2021-06-28 DIAGNOSIS — E78.5 HYPERLIPIDEMIA ASSOCIATED WITH TYPE 2 DIABETES MELLITUS: Chronic | ICD-10-CM

## 2021-06-28 DIAGNOSIS — E11.59 HYPERTENSION ASSOCIATED WITH DIABETES: Chronic | ICD-10-CM

## 2021-06-28 DIAGNOSIS — E11.69 HYPERLIPIDEMIA ASSOCIATED WITH TYPE 2 DIABETES MELLITUS: Chronic | ICD-10-CM

## 2021-06-28 PROBLEM — E66.812 CLASS 2 SEVERE OBESITY DUE TO EXCESS CALORIES WITH SERIOUS COMORBIDITY AND BODY MASS INDEX (BMI) OF 37.0 TO 37.9 IN ADULT: Chronic | Status: ACTIVE | Noted: 2021-06-28

## 2021-06-28 PROCEDURE — 99214 PR OFFICE/OUTPT VISIT, EST, LEVL IV, 30-39 MIN: ICD-10-PCS | Mod: S$GLB,,, | Performed by: FAMILY MEDICINE

## 2021-06-28 PROCEDURE — 1101F PR PT FALLS ASSESS DOC 0-1 FALLS W/OUT INJ PAST YR: ICD-10-PCS | Mod: CPTII,S$GLB,, | Performed by: FAMILY MEDICINE

## 2021-06-28 PROCEDURE — 99214 OFFICE O/P EST MOD 30 MIN: CPT | Mod: S$GLB,,, | Performed by: FAMILY MEDICINE

## 2021-06-28 PROCEDURE — 3008F BODY MASS INDEX DOCD: CPT | Mod: CPTII,S$GLB,, | Performed by: FAMILY MEDICINE

## 2021-06-28 PROCEDURE — 3072F LOW RISK FOR RETINOPATHY: CPT | Mod: CPTII,S$GLB,, | Performed by: FAMILY MEDICINE

## 2021-06-28 PROCEDURE — 3052F HG A1C>EQUAL 8.0%<EQUAL 9.0%: CPT | Mod: CPTII,S$GLB,, | Performed by: FAMILY MEDICINE

## 2021-06-28 PROCEDURE — 3075F SYST BP GE 130 - 139MM HG: CPT | Mod: CPTII,S$GLB,, | Performed by: FAMILY MEDICINE

## 2021-06-28 PROCEDURE — 1126F PR PAIN SEVERITY QUANTIFIED, NO PAIN PRESENT: ICD-10-PCS | Mod: CPTII,S$GLB,, | Performed by: FAMILY MEDICINE

## 2021-06-28 PROCEDURE — 1126F AMNT PAIN NOTED NONE PRSNT: CPT | Mod: CPTII,S$GLB,, | Performed by: FAMILY MEDICINE

## 2021-06-28 PROCEDURE — 3052F PR MOST RECENT HEMOGLOBIN A1C LEVEL 8.0 - < 9.0%: ICD-10-PCS | Mod: CPTII,S$GLB,, | Performed by: FAMILY MEDICINE

## 2021-06-28 PROCEDURE — 99999 PR PBB SHADOW E&M-EST. PATIENT-LVL V: CPT | Mod: PBBFAC,,, | Performed by: FAMILY MEDICINE

## 2021-06-28 PROCEDURE — 3008F PR BODY MASS INDEX (BMI) DOCUMENTED: ICD-10-PCS | Mod: CPTII,S$GLB,, | Performed by: FAMILY MEDICINE

## 2021-06-28 PROCEDURE — 3078F PR MOST RECENT DIASTOLIC BLOOD PRESSURE < 80 MM HG: ICD-10-PCS | Mod: CPTII,S$GLB,, | Performed by: FAMILY MEDICINE

## 2021-06-28 PROCEDURE — 99999 PR PBB SHADOW E&M-EST. PATIENT-LVL V: ICD-10-PCS | Mod: PBBFAC,,, | Performed by: FAMILY MEDICINE

## 2021-06-28 PROCEDURE — 3072F PR LOW RISK FOR RETINOPATHY: ICD-10-PCS | Mod: CPTII,S$GLB,, | Performed by: FAMILY MEDICINE

## 2021-06-28 PROCEDURE — 3288F PR FALLS RISK ASSESSMENT DOCUMENTED: ICD-10-PCS | Mod: CPTII,S$GLB,, | Performed by: FAMILY MEDICINE

## 2021-06-28 PROCEDURE — 1101F PT FALLS ASSESS-DOCD LE1/YR: CPT | Mod: CPTII,S$GLB,, | Performed by: FAMILY MEDICINE

## 2021-06-28 PROCEDURE — 3075F PR MOST RECENT SYSTOLIC BLOOD PRESS GE 130-139MM HG: ICD-10-PCS | Mod: CPTII,S$GLB,, | Performed by: FAMILY MEDICINE

## 2021-06-28 PROCEDURE — 3078F DIAST BP <80 MM HG: CPT | Mod: CPTII,S$GLB,, | Performed by: FAMILY MEDICINE

## 2021-06-28 PROCEDURE — 3288F FALL RISK ASSESSMENT DOCD: CPT | Mod: CPTII,S$GLB,, | Performed by: FAMILY MEDICINE

## 2021-07-19 ENCOUNTER — LAB VISIT (OUTPATIENT)
Dept: LAB | Facility: HOSPITAL | Age: 66
End: 2021-07-19
Payer: MEDICARE

## 2021-07-19 DIAGNOSIS — M06.00 SERONEGATIVE RHEUMATOID ARTHRITIS: ICD-10-CM

## 2021-07-19 DIAGNOSIS — M19.041 PRIMARY OSTEOARTHRITIS OF BOTH HANDS: ICD-10-CM

## 2021-07-19 DIAGNOSIS — M19.042 PRIMARY OSTEOARTHRITIS OF BOTH HANDS: ICD-10-CM

## 2021-07-19 DIAGNOSIS — M15.9 GENERALIZED OSTEOARTHRITIS OF MULTIPLE SITES: Chronic | ICD-10-CM

## 2021-07-19 LAB
ALBUMIN SERPL BCP-MCNC: 3.9 G/DL (ref 3.5–5.2)
ALP SERPL-CCNC: 78 U/L (ref 55–135)
ALT SERPL W/O P-5'-P-CCNC: 32 U/L (ref 10–44)
ANION GAP SERPL CALC-SCNC: 11 MMOL/L (ref 8–16)
AST SERPL-CCNC: 21 U/L (ref 10–40)
BASOPHILS # BLD AUTO: 0.04 K/UL (ref 0–0.2)
BASOPHILS NFR BLD: 0.6 % (ref 0–1.9)
BILIRUB SERPL-MCNC: 0.5 MG/DL (ref 0.1–1)
BUN SERPL-MCNC: 12 MG/DL (ref 8–23)
CALCIUM SERPL-MCNC: 9.5 MG/DL (ref 8.7–10.5)
CHLORIDE SERPL-SCNC: 105 MMOL/L (ref 95–110)
CO2 SERPL-SCNC: 26 MMOL/L (ref 23–29)
CREAT SERPL-MCNC: 0.7 MG/DL (ref 0.5–1.4)
CRP SERPL-MCNC: 5 MG/L (ref 0–8.2)
DIFFERENTIAL METHOD: ABNORMAL
EOSINOPHIL # BLD AUTO: 0.3 K/UL (ref 0–0.5)
EOSINOPHIL NFR BLD: 4.7 % (ref 0–8)
ERYTHROCYTE [DISTWIDTH] IN BLOOD BY AUTOMATED COUNT: 12.4 % (ref 11.5–14.5)
ERYTHROCYTE [SEDIMENTATION RATE] IN BLOOD BY WESTERGREN METHOD: 16 MM/HR (ref 0–36)
EST. GFR  (AFRICAN AMERICAN): >60 ML/MIN/1.73 M^2
EST. GFR  (NON AFRICAN AMERICAN): >60 ML/MIN/1.73 M^2
GLUCOSE SERPL-MCNC: 119 MG/DL (ref 70–110)
HCT VFR BLD AUTO: 41.1 % (ref 37–48.5)
HGB BLD-MCNC: 13.9 G/DL (ref 12–16)
IMM GRANULOCYTES # BLD AUTO: 0.01 K/UL (ref 0–0.04)
IMM GRANULOCYTES NFR BLD AUTO: 0.2 % (ref 0–0.5)
LYMPHOCYTES # BLD AUTO: 1.4 K/UL (ref 1–4.8)
LYMPHOCYTES NFR BLD: 22.2 % (ref 18–48)
MCH RBC QN AUTO: 31.1 PG (ref 27–31)
MCHC RBC AUTO-ENTMCNC: 33.8 G/DL (ref 32–36)
MCV RBC AUTO: 92 FL (ref 82–98)
MONOCYTES # BLD AUTO: 0.4 K/UL (ref 0.3–1)
MONOCYTES NFR BLD: 6.8 % (ref 4–15)
NEUTROPHILS # BLD AUTO: 4.1 K/UL (ref 1.8–7.7)
NEUTROPHILS NFR BLD: 65.5 % (ref 38–73)
NRBC BLD-RTO: 0 /100 WBC
PLATELET # BLD AUTO: 227 K/UL (ref 150–450)
PMV BLD AUTO: 10.1 FL (ref 9.2–12.9)
POTASSIUM SERPL-SCNC: 4.9 MMOL/L (ref 3.5–5.1)
PROT SERPL-MCNC: 7.1 G/DL (ref 6–8.4)
RBC # BLD AUTO: 4.47 M/UL (ref 4–5.4)
SODIUM SERPL-SCNC: 142 MMOL/L (ref 136–145)
WBC # BLD AUTO: 6.21 K/UL (ref 3.9–12.7)

## 2021-07-19 PROCEDURE — 85652 RBC SED RATE AUTOMATED: CPT | Performed by: INTERNAL MEDICINE

## 2021-07-19 PROCEDURE — 85025 COMPLETE CBC W/AUTO DIFF WBC: CPT | Performed by: INTERNAL MEDICINE

## 2021-07-19 PROCEDURE — 80053 COMPREHEN METABOLIC PANEL: CPT | Performed by: INTERNAL MEDICINE

## 2021-07-19 PROCEDURE — 36415 COLL VENOUS BLD VENIPUNCTURE: CPT | Performed by: INTERNAL MEDICINE

## 2021-07-19 PROCEDURE — 86140 C-REACTIVE PROTEIN: CPT | Performed by: INTERNAL MEDICINE

## 2021-08-02 ENCOUNTER — OFFICE VISIT (OUTPATIENT)
Dept: PODIATRY | Facility: CLINIC | Age: 66
End: 2021-08-02
Payer: MEDICARE

## 2021-08-02 VITALS
DIASTOLIC BLOOD PRESSURE: 72 MMHG | WEIGHT: 199.94 LBS | BODY MASS INDEX: 35.43 KG/M2 | SYSTOLIC BLOOD PRESSURE: 133 MMHG | HEART RATE: 88 BPM | HEIGHT: 63 IN

## 2021-08-02 DIAGNOSIS — E11.42 TYPE 2 DIABETES MELLITUS WITH DIABETIC POLYNEUROPATHY, WITHOUT LONG-TERM CURRENT USE OF INSULIN: ICD-10-CM

## 2021-08-02 DIAGNOSIS — S96.912D LEFT ANKLE STRAIN, SUBSEQUENT ENCOUNTER: Primary | ICD-10-CM

## 2021-08-02 PROCEDURE — 3288F FALL RISK ASSESSMENT DOCD: CPT | Mod: CPTII,S$GLB,, | Performed by: PODIATRIST

## 2021-08-02 PROCEDURE — 3072F LOW RISK FOR RETINOPATHY: CPT | Mod: CPTII,S$GLB,, | Performed by: PODIATRIST

## 2021-08-02 PROCEDURE — 3072F PR LOW RISK FOR RETINOPATHY: ICD-10-PCS | Mod: CPTII,S$GLB,, | Performed by: PODIATRIST

## 2021-08-02 PROCEDURE — 1160F PR REVIEW ALL MEDS BY PRESCRIBER/CLIN PHARMACIST DOCUMENTED: ICD-10-PCS | Mod: CPTII,S$GLB,, | Performed by: PODIATRIST

## 2021-08-02 PROCEDURE — 1160F RVW MEDS BY RX/DR IN RCRD: CPT | Mod: CPTII,S$GLB,, | Performed by: PODIATRIST

## 2021-08-02 PROCEDURE — 3008F BODY MASS INDEX DOCD: CPT | Mod: CPTII,S$GLB,, | Performed by: PODIATRIST

## 2021-08-02 PROCEDURE — 3288F PR FALLS RISK ASSESSMENT DOCUMENTED: ICD-10-PCS | Mod: CPTII,S$GLB,, | Performed by: PODIATRIST

## 2021-08-02 PROCEDURE — 1101F PT FALLS ASSESS-DOCD LE1/YR: CPT | Mod: CPTII,S$GLB,, | Performed by: PODIATRIST

## 2021-08-02 PROCEDURE — 3008F PR BODY MASS INDEX (BMI) DOCUMENTED: ICD-10-PCS | Mod: CPTII,S$GLB,, | Performed by: PODIATRIST

## 2021-08-02 PROCEDURE — 99212 PR OFFICE/OUTPT VISIT, EST, LEVL II, 10-19 MIN: ICD-10-PCS | Mod: S$GLB,,, | Performed by: PODIATRIST

## 2021-08-02 PROCEDURE — 1159F PR MEDICATION LIST DOCUMENTED IN MEDICAL RECORD: ICD-10-PCS | Mod: CPTII,S$GLB,, | Performed by: PODIATRIST

## 2021-08-02 PROCEDURE — 3075F SYST BP GE 130 - 139MM HG: CPT | Mod: CPTII,S$GLB,, | Performed by: PODIATRIST

## 2021-08-02 PROCEDURE — 1101F PR PT FALLS ASSESS DOC 0-1 FALLS W/OUT INJ PAST YR: ICD-10-PCS | Mod: CPTII,S$GLB,, | Performed by: PODIATRIST

## 2021-08-02 PROCEDURE — 3075F PR MOST RECENT SYSTOLIC BLOOD PRESS GE 130-139MM HG: ICD-10-PCS | Mod: CPTII,S$GLB,, | Performed by: PODIATRIST

## 2021-08-02 PROCEDURE — 99999 PR PBB SHADOW E&M-EST. PATIENT-LVL III: CPT | Mod: PBBFAC,,, | Performed by: PODIATRIST

## 2021-08-02 PROCEDURE — 99212 OFFICE O/P EST SF 10 MIN: CPT | Mod: S$GLB,,, | Performed by: PODIATRIST

## 2021-08-02 PROCEDURE — 3078F PR MOST RECENT DIASTOLIC BLOOD PRESSURE < 80 MM HG: ICD-10-PCS | Mod: CPTII,S$GLB,, | Performed by: PODIATRIST

## 2021-08-02 PROCEDURE — 3052F HG A1C>EQUAL 8.0%<EQUAL 9.0%: CPT | Mod: CPTII,S$GLB,, | Performed by: PODIATRIST

## 2021-08-02 PROCEDURE — 3078F DIAST BP <80 MM HG: CPT | Mod: CPTII,S$GLB,, | Performed by: PODIATRIST

## 2021-08-02 PROCEDURE — 99999 PR PBB SHADOW E&M-EST. PATIENT-LVL III: ICD-10-PCS | Mod: PBBFAC,,, | Performed by: PODIATRIST

## 2021-08-02 PROCEDURE — 3052F PR MOST RECENT HEMOGLOBIN A1C LEVEL 8.0 - < 9.0%: ICD-10-PCS | Mod: CPTII,S$GLB,, | Performed by: PODIATRIST

## 2021-08-02 PROCEDURE — 1159F MED LIST DOCD IN RCRD: CPT | Mod: CPTII,S$GLB,, | Performed by: PODIATRIST

## 2021-08-09 ENCOUNTER — TELEPHONE (OUTPATIENT)
Dept: INTERNAL MEDICINE | Facility: CLINIC | Age: 66
End: 2021-08-09

## 2021-08-09 DIAGNOSIS — E11.65 TYPE 2 DIABETES MELLITUS WITH HYPERGLYCEMIA, WITHOUT LONG-TERM CURRENT USE OF INSULIN: Chronic | ICD-10-CM

## 2021-08-16 ENCOUNTER — CLINICAL SUPPORT (OUTPATIENT)
Dept: DIABETES | Facility: CLINIC | Age: 66
End: 2021-08-16
Payer: MEDICARE

## 2021-08-16 ENCOUNTER — LAB VISIT (OUTPATIENT)
Dept: LAB | Facility: HOSPITAL | Age: 66
End: 2021-08-16
Attending: FAMILY MEDICINE
Payer: MEDICARE

## 2021-08-16 ENCOUNTER — PATIENT OUTREACH (OUTPATIENT)
Dept: ADMINISTRATIVE | Facility: OTHER | Age: 66
End: 2021-08-16

## 2021-08-16 ENCOUNTER — OFFICE VISIT (OUTPATIENT)
Dept: OPHTHALMOLOGY | Facility: CLINIC | Age: 66
End: 2021-08-16
Payer: MEDICARE

## 2021-08-16 VITALS — BODY MASS INDEX: 34.96 KG/M2 | HEIGHT: 63 IN | WEIGHT: 197.31 LBS

## 2021-08-16 DIAGNOSIS — H52.4 HYPEROPIA WITH PRESBYOPIA, BILATERAL: ICD-10-CM

## 2021-08-16 DIAGNOSIS — E11.65 TYPE 2 DIABETES MELLITUS WITH HYPERGLYCEMIA, WITHOUT LONG-TERM CURRENT USE OF INSULIN: Chronic | ICD-10-CM

## 2021-08-16 DIAGNOSIS — E11.9 DIABETES MELLITUS TYPE 2 WITHOUT RETINOPATHY: Primary | ICD-10-CM

## 2021-08-16 DIAGNOSIS — H52.03 HYPEROPIA WITH PRESBYOPIA, BILATERAL: ICD-10-CM

## 2021-08-16 DIAGNOSIS — H25.013 CORTICAL AGE-RELATED CATARACT OF BOTH EYES: ICD-10-CM

## 2021-08-16 DIAGNOSIS — E11.69 TYPE 2 DIABETES MELLITUS WITH OTHER SPECIFIED COMPLICATION, WITHOUT LONG-TERM CURRENT USE OF INSULIN: ICD-10-CM

## 2021-08-16 DIAGNOSIS — H25.13 NUCLEAR SCLEROSIS, BILATERAL: ICD-10-CM

## 2021-08-16 LAB
ESTIMATED AVG GLUCOSE: 146 MG/DL (ref 68–131)
HBA1C MFR BLD: 6.7 % (ref 4–5.6)

## 2021-08-16 PROCEDURE — 99999 PR PBB SHADOW E&M-EST. PATIENT-LVL III: CPT | Mod: PBBFAC,,, | Performed by: DIETITIAN, REGISTERED

## 2021-08-16 PROCEDURE — 92015 PR REFRACTION: ICD-10-PCS | Mod: S$GLB,,, | Performed by: OPTOMETRIST

## 2021-08-16 PROCEDURE — 92015 DETERMINE REFRACTIVE STATE: CPT | Mod: S$GLB,,, | Performed by: OPTOMETRIST

## 2021-08-16 PROCEDURE — 3052F PR MOST RECENT HEMOGLOBIN A1C LEVEL 8.0 - < 9.0%: ICD-10-PCS | Mod: CPTII,S$GLB,, | Performed by: OPTOMETRIST

## 2021-08-16 PROCEDURE — 99999 PR PBB SHADOW E&M-EST. PATIENT-LVL III: CPT | Mod: PBBFAC,,, | Performed by: OPTOMETRIST

## 2021-08-16 PROCEDURE — 83036 HEMOGLOBIN GLYCOSYLATED A1C: CPT | Performed by: FAMILY MEDICINE

## 2021-08-16 PROCEDURE — 92014 PR EYE EXAM, EST PATIENT,COMPREHESV: ICD-10-PCS | Mod: S$GLB,,, | Performed by: OPTOMETRIST

## 2021-08-16 PROCEDURE — 99999 PR PBB SHADOW E&M-EST. PATIENT-LVL III: ICD-10-PCS | Mod: PBBFAC,,, | Performed by: DIETITIAN, REGISTERED

## 2021-08-16 PROCEDURE — 1159F MED LIST DOCD IN RCRD: CPT | Mod: CPTII,S$GLB,, | Performed by: OPTOMETRIST

## 2021-08-16 PROCEDURE — 99999 PR PBB SHADOW E&M-EST. PATIENT-LVL III: ICD-10-PCS | Mod: PBBFAC,,, | Performed by: OPTOMETRIST

## 2021-08-16 PROCEDURE — 1160F PR REVIEW ALL MEDS BY PRESCRIBER/CLIN PHARMACIST DOCUMENTED: ICD-10-PCS | Mod: CPTII,S$GLB,, | Performed by: OPTOMETRIST

## 2021-08-16 PROCEDURE — 2023F PR DILATED RETINAL EXAM W/O EVID OF RETINOPATHY: ICD-10-PCS | Mod: CPTII,S$GLB,, | Performed by: OPTOMETRIST

## 2021-08-16 PROCEDURE — 36415 COLL VENOUS BLD VENIPUNCTURE: CPT | Performed by: FAMILY MEDICINE

## 2021-08-16 PROCEDURE — G0108 PR DIAB MANAGE TRN  PER INDIV: ICD-10-PCS | Mod: S$GLB,,, | Performed by: DIETITIAN, REGISTERED

## 2021-08-16 PROCEDURE — G0108 DIAB MANAGE TRN  PER INDIV: HCPCS | Mod: S$GLB,,, | Performed by: DIETITIAN, REGISTERED

## 2021-08-16 PROCEDURE — 92014 COMPRE OPH EXAM EST PT 1/>: CPT | Mod: S$GLB,,, | Performed by: OPTOMETRIST

## 2021-08-16 PROCEDURE — 2023F DILAT RTA XM W/O RTNOPTHY: CPT | Mod: CPTII,S$GLB,, | Performed by: OPTOMETRIST

## 2021-08-16 PROCEDURE — 3052F HG A1C>EQUAL 8.0%<EQUAL 9.0%: CPT | Mod: CPTII,S$GLB,, | Performed by: OPTOMETRIST

## 2021-08-16 PROCEDURE — 1160F RVW MEDS BY RX/DR IN RCRD: CPT | Mod: CPTII,S$GLB,, | Performed by: OPTOMETRIST

## 2021-08-16 PROCEDURE — 1159F PR MEDICATION LIST DOCUMENTED IN MEDICAL RECORD: ICD-10-PCS | Mod: CPTII,S$GLB,, | Performed by: OPTOMETRIST

## 2021-09-07 ENCOUNTER — PATIENT MESSAGE (OUTPATIENT)
Dept: INTERNAL MEDICINE | Facility: CLINIC | Age: 66
End: 2021-09-07

## 2021-10-04 DIAGNOSIS — I15.2 HYPERTENSION ASSOCIATED WITH DIABETES: Chronic | ICD-10-CM

## 2021-10-04 DIAGNOSIS — Z78.0 ASYMPTOMATIC MENOPAUSAL STATE: ICD-10-CM

## 2021-10-04 DIAGNOSIS — E11.59 HYPERTENSION ASSOCIATED WITH DIABETES: Chronic | ICD-10-CM

## 2021-10-04 DIAGNOSIS — Z12.31 BREAST CANCER SCREENING BY MAMMOGRAM: ICD-10-CM

## 2021-10-04 DIAGNOSIS — E11.42 TYPE 2 DIABETES MELLITUS WITH DIABETIC POLYNEUROPATHY, WITHOUT LONG-TERM CURRENT USE OF INSULIN: Primary | ICD-10-CM

## 2021-10-04 RX ORDER — AMLODIPINE BESYLATE AND BENAZEPRIL HYDROCHLORIDE 2.5; 1 MG/1; MG/1
1 CAPSULE ORAL DAILY
Qty: 90 CAPSULE | Refills: 0 | Status: SHIPPED | OUTPATIENT
Start: 2021-10-04 | End: 2022-01-05 | Stop reason: SDUPTHER

## 2021-11-12 ENCOUNTER — PATIENT OUTREACH (OUTPATIENT)
Dept: ADMINISTRATIVE | Facility: OTHER | Age: 66
End: 2021-11-12
Payer: MEDICARE

## 2021-11-15 ENCOUNTER — CLINICAL SUPPORT (OUTPATIENT)
Dept: DIABETES | Facility: CLINIC | Age: 66
End: 2021-11-15
Payer: MEDICARE

## 2021-11-15 VITALS — HEIGHT: 63 IN | WEIGHT: 179 LBS | BODY MASS INDEX: 31.71 KG/M2

## 2021-11-15 DIAGNOSIS — E11.69 TYPE 2 DIABETES MELLITUS WITH OTHER SPECIFIED COMPLICATION, WITHOUT LONG-TERM CURRENT USE OF INSULIN: ICD-10-CM

## 2021-11-15 DIAGNOSIS — E11.65 TYPE 2 DIABETES MELLITUS WITH HYPERGLYCEMIA, WITHOUT LONG-TERM CURRENT USE OF INSULIN: Chronic | ICD-10-CM

## 2021-11-15 PROCEDURE — G0108 PR DIAB MANAGE TRN  PER INDIV: ICD-10-PCS | Mod: S$GLB,,, | Performed by: DIETITIAN, REGISTERED

## 2021-11-15 PROCEDURE — 99999 PR PBB SHADOW E&M-EST. PATIENT-LVL III: ICD-10-PCS | Mod: PBBFAC,,, | Performed by: DIETITIAN, REGISTERED

## 2021-11-15 PROCEDURE — G0108 DIAB MANAGE TRN  PER INDIV: HCPCS | Mod: S$GLB,,, | Performed by: DIETITIAN, REGISTERED

## 2021-11-15 PROCEDURE — 99999 PR PBB SHADOW E&M-EST. PATIENT-LVL III: CPT | Mod: PBBFAC,,, | Performed by: DIETITIAN, REGISTERED

## 2021-11-26 ENCOUNTER — OFFICE VISIT (OUTPATIENT)
Dept: URGENT CARE | Facility: CLINIC | Age: 66
End: 2021-11-26
Payer: MEDICARE

## 2021-11-26 VITALS
BODY MASS INDEX: 34.73 KG/M2 | TEMPERATURE: 98 F | HEART RATE: 94 BPM | RESPIRATION RATE: 18 BRPM | SYSTOLIC BLOOD PRESSURE: 124 MMHG | DIASTOLIC BLOOD PRESSURE: 69 MMHG | OXYGEN SATURATION: 97 % | HEIGHT: 63 IN | WEIGHT: 196 LBS

## 2021-11-26 DIAGNOSIS — R05.9 COUGH: ICD-10-CM

## 2021-11-26 DIAGNOSIS — B96.89 ACUTE BACTERIAL SINUSITIS: ICD-10-CM

## 2021-11-26 DIAGNOSIS — E11.59 HYPERTENSION ASSOCIATED WITH DIABETES: ICD-10-CM

## 2021-11-26 DIAGNOSIS — I15.2 HYPERTENSION ASSOCIATED WITH DIABETES: ICD-10-CM

## 2021-11-26 DIAGNOSIS — J32.9 SINUSITIS, UNSPECIFIED CHRONICITY, UNSPECIFIED LOCATION: Primary | ICD-10-CM

## 2021-11-26 DIAGNOSIS — R51.9 SINUS HEADACHE: ICD-10-CM

## 2021-11-26 DIAGNOSIS — J01.90 ACUTE BACTERIAL SINUSITIS: ICD-10-CM

## 2021-11-26 LAB
CTP QC/QA: YES
SARS-COV-2 RDRP RESP QL NAA+PROBE: NEGATIVE

## 2021-11-26 PROCEDURE — 3061F PR NEG MICROALBUMINURIA RESULT DOCUMENTED/REVIEW: ICD-10-PCS | Mod: CPTII,S$GLB,, | Performed by: NURSE PRACTITIONER

## 2021-11-26 PROCEDURE — 3066F PR DOCUMENTATION OF TREATMENT FOR NEPHROPATHY: ICD-10-PCS | Mod: CPTII,S$GLB,, | Performed by: NURSE PRACTITIONER

## 2021-11-26 PROCEDURE — 99214 OFFICE O/P EST MOD 30 MIN: CPT | Mod: S$GLB,,, | Performed by: NURSE PRACTITIONER

## 2021-11-26 PROCEDURE — 3072F LOW RISK FOR RETINOPATHY: CPT | Mod: CPTII,S$GLB,, | Performed by: NURSE PRACTITIONER

## 2021-11-26 PROCEDURE — 3066F NEPHROPATHY DOC TX: CPT | Mod: CPTII,S$GLB,, | Performed by: NURSE PRACTITIONER

## 2021-11-26 PROCEDURE — 99214 PR OFFICE/OUTPT VISIT, EST, LEVL IV, 30-39 MIN: ICD-10-PCS | Mod: S$GLB,,, | Performed by: NURSE PRACTITIONER

## 2021-11-26 PROCEDURE — 3061F NEG MICROALBUMINURIA REV: CPT | Mod: CPTII,S$GLB,, | Performed by: NURSE PRACTITIONER

## 2021-11-26 PROCEDURE — 3072F PR LOW RISK FOR RETINOPATHY: ICD-10-PCS | Mod: CPTII,S$GLB,, | Performed by: NURSE PRACTITIONER

## 2021-11-26 PROCEDURE — 4010F PR ACE/ARB THEARPY RXD/TAKEN: ICD-10-PCS | Mod: CPTII,S$GLB,, | Performed by: NURSE PRACTITIONER

## 2021-11-26 PROCEDURE — U0002 COVID-19 LAB TEST NON-CDC: HCPCS | Mod: QW,S$GLB,, | Performed by: NURSE PRACTITIONER

## 2021-11-26 PROCEDURE — U0002: ICD-10-PCS | Mod: QW,S$GLB,, | Performed by: NURSE PRACTITIONER

## 2021-11-26 PROCEDURE — 4010F ACE/ARB THERAPY RXD/TAKEN: CPT | Mod: CPTII,S$GLB,, | Performed by: NURSE PRACTITIONER

## 2021-11-26 RX ORDER — DOXYCYCLINE 100 MG/1
100 CAPSULE ORAL EVERY 12 HOURS
Qty: 20 CAPSULE | Refills: 0 | Status: SHIPPED | OUTPATIENT
Start: 2021-11-26 | End: 2021-12-06

## 2021-11-26 RX ORDER — FLUTICASONE PROPIONATE 50 MCG
2 SPRAY, SUSPENSION (ML) NASAL DAILY
Qty: 16 G | Refills: 0 | Status: SHIPPED | OUTPATIENT
Start: 2021-11-26 | End: 2022-08-22

## 2021-11-26 RX ORDER — PROMETHAZINE HYDROCHLORIDE AND DEXTROMETHORPHAN HYDROBROMIDE 6.25; 15 MG/5ML; MG/5ML
5 SYRUP ORAL
Qty: 180 ML | Refills: 0 | Status: SHIPPED | OUTPATIENT
Start: 2021-11-26 | End: 2021-12-13

## 2021-11-29 ENCOUNTER — TELEPHONE (OUTPATIENT)
Dept: URGENT CARE | Facility: CLINIC | Age: 66
End: 2021-11-29
Payer: MEDICARE

## 2021-12-05 RX ORDER — INSULIN PUMP SYRINGE, 3 ML
EACH MISCELLANEOUS
Qty: 1 EACH | Refills: 0 | Status: SHIPPED | OUTPATIENT
Start: 2021-12-05 | End: 2023-12-11

## 2021-12-10 ENCOUNTER — TELEPHONE (OUTPATIENT)
Dept: RHEUMATOLOGY | Facility: CLINIC | Age: 66
End: 2021-12-10
Payer: MEDICARE

## 2021-12-13 ENCOUNTER — LAB VISIT (OUTPATIENT)
Dept: LAB | Facility: HOSPITAL | Age: 66
End: 2021-12-13
Attending: FAMILY MEDICINE
Payer: MEDICARE

## 2021-12-13 ENCOUNTER — OFFICE VISIT (OUTPATIENT)
Dept: RHEUMATOLOGY | Facility: CLINIC | Age: 66
End: 2021-12-13
Payer: MEDICARE

## 2021-12-13 VITALS
DIASTOLIC BLOOD PRESSURE: 81 MMHG | WEIGHT: 173.5 LBS | SYSTOLIC BLOOD PRESSURE: 127 MMHG | HEIGHT: 63 IN | HEART RATE: 84 BPM | BODY MASS INDEX: 30.74 KG/M2

## 2021-12-13 DIAGNOSIS — E11.42 TYPE 2 DIABETES MELLITUS WITH DIABETIC POLYNEUROPATHY, WITHOUT LONG-TERM CURRENT USE OF INSULIN: ICD-10-CM

## 2021-12-13 DIAGNOSIS — M19.041 PRIMARY OSTEOARTHRITIS OF BOTH HANDS: ICD-10-CM

## 2021-12-13 DIAGNOSIS — M06.00 SERONEGATIVE RHEUMATOID ARTHRITIS: Primary | ICD-10-CM

## 2021-12-13 DIAGNOSIS — D84.9 IMMUNOCOMPROMISED: ICD-10-CM

## 2021-12-13 DIAGNOSIS — M19.042 PRIMARY OSTEOARTHRITIS OF BOTH HANDS: ICD-10-CM

## 2021-12-13 DIAGNOSIS — M15.9 GENERALIZED OSTEOARTHRITIS OF MULTIPLE SITES: Chronic | ICD-10-CM

## 2021-12-13 DIAGNOSIS — Z79.899 LONG TERM CURRENT USE OF IMMUNOSUPPRESSIVE DRUG: ICD-10-CM

## 2021-12-13 DIAGNOSIS — Z79.899 LONG TERM CURRENT USE OF IMMUNOSUPPRESSIVE DRUG: Primary | ICD-10-CM

## 2021-12-13 PROBLEM — Z79.60 LONG TERM CURRENT USE OF IMMUNOSUPPRESSIVE DRUG: Status: ACTIVE | Noted: 2021-12-13

## 2021-12-13 LAB
ESTIMATED AVG GLUCOSE: 126 MG/DL (ref 68–131)
HBA1C MFR BLD: 6 % (ref 4–5.6)

## 2021-12-13 PROCEDURE — 36415 COLL VENOUS BLD VENIPUNCTURE: CPT | Mod: PO | Performed by: FAMILY MEDICINE

## 2021-12-13 PROCEDURE — 99215 OFFICE O/P EST HI 40 MIN: CPT | Mod: S$GLB,,, | Performed by: INTERNAL MEDICINE

## 2021-12-13 PROCEDURE — 4010F ACE/ARB THERAPY RXD/TAKEN: CPT | Mod: CPTII,S$GLB,, | Performed by: INTERNAL MEDICINE

## 2021-12-13 PROCEDURE — 99215 PR OFFICE/OUTPT VISIT, EST, LEVL V, 40-54 MIN: ICD-10-PCS | Mod: S$GLB,,, | Performed by: INTERNAL MEDICINE

## 2021-12-13 PROCEDURE — 3061F NEG MICROALBUMINURIA REV: CPT | Mod: CPTII,S$GLB,, | Performed by: INTERNAL MEDICINE

## 2021-12-13 PROCEDURE — 99999 PR PBB SHADOW E&M-EST. PATIENT-LVL IV: CPT | Mod: PBBFAC,,, | Performed by: INTERNAL MEDICINE

## 2021-12-13 PROCEDURE — 3066F PR DOCUMENTATION OF TREATMENT FOR NEPHROPATHY: ICD-10-PCS | Mod: CPTII,S$GLB,, | Performed by: INTERNAL MEDICINE

## 2021-12-13 PROCEDURE — 4010F PR ACE/ARB THEARPY RXD/TAKEN: ICD-10-PCS | Mod: CPTII,S$GLB,, | Performed by: INTERNAL MEDICINE

## 2021-12-13 PROCEDURE — 3061F PR NEG MICROALBUMINURIA RESULT DOCUMENTED/REVIEW: ICD-10-PCS | Mod: CPTII,S$GLB,, | Performed by: INTERNAL MEDICINE

## 2021-12-13 PROCEDURE — 3072F LOW RISK FOR RETINOPATHY: CPT | Mod: CPTII,S$GLB,, | Performed by: INTERNAL MEDICINE

## 2021-12-13 PROCEDURE — 83036 HEMOGLOBIN GLYCOSYLATED A1C: CPT | Performed by: FAMILY MEDICINE

## 2021-12-13 PROCEDURE — 3066F NEPHROPATHY DOC TX: CPT | Mod: CPTII,S$GLB,, | Performed by: INTERNAL MEDICINE

## 2021-12-13 PROCEDURE — 99999 PR PBB SHADOW E&M-EST. PATIENT-LVL IV: ICD-10-PCS | Mod: PBBFAC,,, | Performed by: INTERNAL MEDICINE

## 2021-12-13 PROCEDURE — 3072F PR LOW RISK FOR RETINOPATHY: ICD-10-PCS | Mod: CPTII,S$GLB,, | Performed by: INTERNAL MEDICINE

## 2021-12-13 RX ORDER — FOLIC ACID 1 MG/1
1 TABLET ORAL DAILY
Qty: 90 TABLET | Refills: 2 | Status: SHIPPED | OUTPATIENT
Start: 2021-12-13 | End: 2022-08-07

## 2021-12-13 RX ORDER — METHOTREXATE 2.5 MG/1
10 TABLET ORAL
Qty: 16 TABLET | Refills: 3 | Status: SHIPPED | OUTPATIENT
Start: 2021-12-13 | End: 2022-03-07

## 2021-12-13 RX ORDER — CELECOXIB 200 MG/1
CAPSULE ORAL
Qty: 180 CAPSULE | Refills: 1 | Status: SHIPPED | OUTPATIENT
Start: 2021-12-13 | End: 2022-09-26

## 2022-01-05 DIAGNOSIS — E11.59 HYPERTENSION ASSOCIATED WITH DIABETES: Chronic | ICD-10-CM

## 2022-01-05 DIAGNOSIS — I15.2 HYPERTENSION ASSOCIATED WITH DIABETES: Chronic | ICD-10-CM

## 2022-01-05 NOTE — TELEPHONE ENCOUNTER
----- Message from Trenton Godwin sent at 1/5/2022 11:58 AM CST -----  Contact: May Mccormick is requesting a refill on amlodipine-benazepril 2.5-10 mg. Please call her back at [phone].              [facility] - [place] - [address]  [address] [facility] [address]  [facility] [address]  Phone: [phone] Fax: [phone]             Thanks  DD

## 2022-01-05 NOTE — TELEPHONE ENCOUNTER
No new care gaps identified.  Powered by [employer]. Reference number: [id number].   1/05/2022 3:07:15 PM CST

## 2022-01-05 NOTE — TELEPHONE ENCOUNTER
----- Message from Katie Enrique sent at 1/5/2022  3:26 PM CST -----  .Type:  Patient Returning Call    Who Called:ANDRES HERRERA [[id number]]  Who Left Message for Patient:nurse  Does the patient know what this is regarding?:  Would the patient rather a call back or a response via [facility]? call  Best Call Back Number: [phone]     Additional Information:

## 2022-01-06 RX ORDER — AMLODIPINE BESYLATE AND BENAZEPRIL HYDROCHLORIDE 2.5; 1 MG/1; MG/1
1 CAPSULE ORAL DAILY
Qty: 90 CAPSULE | Refills: 0 | Status: SHIPPED | OUTPATIENT
Start: 2022-01-06 | End: 2022-02-02 | Stop reason: SDUPTHER

## 2022-01-06 NOTE — TELEPHONE ENCOUNTER
REFILL REQUEST APPROVED.  Requested Prescriptions   Pending Prescriptions Disp Refills    amlodipine-benazepril 2.5-10 mg (LOTREL) 2.5-10 mg per capsule 90 capsule 0     Sig: Take 1 capsule by mouth once daily.

## 2022-02-02 ENCOUNTER — OFFICE VISIT (OUTPATIENT)
Dept: INTERNAL MEDICINE | Facility: CLINIC | Age: 67
End: 2022-02-02
Payer: MEDICARE

## 2022-02-02 VITALS
OXYGEN SATURATION: 98 % | WEIGHT: 174.19 LBS | DIASTOLIC BLOOD PRESSURE: 60 MMHG | SYSTOLIC BLOOD PRESSURE: 120 MMHG | TEMPERATURE: 98 F | HEIGHT: 63 IN | HEART RATE: 93 BPM | BODY MASS INDEX: 30.86 KG/M2

## 2022-02-02 DIAGNOSIS — E11.69 HYPERLIPIDEMIA ASSOCIATED WITH TYPE 2 DIABETES MELLITUS: Chronic | ICD-10-CM

## 2022-02-02 DIAGNOSIS — D84.9 IMMUNOCOMPROMISED: ICD-10-CM

## 2022-02-02 DIAGNOSIS — I15.2 HYPERTENSION ASSOCIATED WITH DIABETES: Chronic | ICD-10-CM

## 2022-02-02 DIAGNOSIS — E11.69 TYPE 2 DIABETES MELLITUS WITH OTHER SPECIFIED COMPLICATION, WITHOUT LONG-TERM CURRENT USE OF INSULIN: Chronic | ICD-10-CM

## 2022-02-02 DIAGNOSIS — Z86.16 HISTORY OF COVID-19: ICD-10-CM

## 2022-02-02 DIAGNOSIS — M06.00 SERONEGATIVE RHEUMATOID ARTHRITIS: ICD-10-CM

## 2022-02-02 DIAGNOSIS — E11.59 HYPERTENSION ASSOCIATED WITH DIABETES: Chronic | ICD-10-CM

## 2022-02-02 DIAGNOSIS — R63.4 WEIGHT LOSS: ICD-10-CM

## 2022-02-02 DIAGNOSIS — I73.9 PERIPHERAL VASCULAR DISEASE, UNSPECIFIED: ICD-10-CM

## 2022-02-02 DIAGNOSIS — Z23 NEED FOR COVID-19 VACCINE: ICD-10-CM

## 2022-02-02 DIAGNOSIS — E78.5 HYPERLIPIDEMIA ASSOCIATED WITH TYPE 2 DIABETES MELLITUS: Chronic | ICD-10-CM

## 2022-02-02 DIAGNOSIS — Z78.0 ASYMPTOMATIC MENOPAUSAL STATE: ICD-10-CM

## 2022-02-02 DIAGNOSIS — E11.65 TYPE 2 DIABETES MELLITUS WITH HYPERGLYCEMIA, WITHOUT LONG-TERM CURRENT USE OF INSULIN: Chronic | ICD-10-CM

## 2022-02-02 DIAGNOSIS — E11.42 TYPE 2 DIABETES MELLITUS WITH DIABETIC POLYNEUROPATHY, WITHOUT LONG-TERM CURRENT USE OF INSULIN: Primary | Chronic | ICD-10-CM

## 2022-02-02 DIAGNOSIS — Z12.31 BREAST CANCER SCREENING BY MAMMOGRAM: ICD-10-CM

## 2022-02-02 PROBLEM — E66.811 CLASS 1 OBESITY DUE TO EXCESS CALORIES WITH SERIOUS COMORBIDITY AND BODY MASS INDEX (BMI) OF 30.0 TO 30.9 IN ADULT: Chronic | Status: ACTIVE | Noted: 2021-06-28

## 2022-02-02 PROBLEM — E66.09 CLASS 1 OBESITY DUE TO EXCESS CALORIES WITH SERIOUS COMORBIDITY AND BODY MASS INDEX (BMI) OF 30.0 TO 30.9 IN ADULT: Chronic | Status: ACTIVE | Noted: 2021-06-28

## 2022-02-02 PROCEDURE — 1101F PR PT FALLS ASSESS DOC 0-1 FALLS W/OUT INJ PAST YR: ICD-10-PCS | Mod: CPTII,S$GLB,, | Performed by: FAMILY MEDICINE

## 2022-02-02 PROCEDURE — 1159F MED LIST DOCD IN RCRD: CPT | Mod: CPTII,S$GLB,, | Performed by: FAMILY MEDICINE

## 2022-02-02 PROCEDURE — 99214 OFFICE O/P EST MOD 30 MIN: CPT | Mod: S$GLB,,, | Performed by: FAMILY MEDICINE

## 2022-02-02 PROCEDURE — 1126F AMNT PAIN NOTED NONE PRSNT: CPT | Mod: CPTII,S$GLB,, | Performed by: FAMILY MEDICINE

## 2022-02-02 PROCEDURE — 3288F FALL RISK ASSESSMENT DOCD: CPT | Mod: CPTII,S$GLB,, | Performed by: FAMILY MEDICINE

## 2022-02-02 PROCEDURE — 3008F BODY MASS INDEX DOCD: CPT | Mod: CPTII,S$GLB,, | Performed by: FAMILY MEDICINE

## 2022-02-02 PROCEDURE — 1160F PR REVIEW ALL MEDS BY PRESCRIBER/CLIN PHARMACIST DOCUMENTED: ICD-10-PCS | Mod: CPTII,S$GLB,, | Performed by: FAMILY MEDICINE

## 2022-02-02 PROCEDURE — 1126F PR PAIN SEVERITY QUANTIFIED, NO PAIN PRESENT: ICD-10-PCS | Mod: CPTII,S$GLB,, | Performed by: FAMILY MEDICINE

## 2022-02-02 PROCEDURE — 3072F LOW RISK FOR RETINOPATHY: CPT | Mod: CPTII,S$GLB,, | Performed by: FAMILY MEDICINE

## 2022-02-02 PROCEDURE — 3074F SYST BP LT 130 MM HG: CPT | Mod: CPTII,S$GLB,, | Performed by: FAMILY MEDICINE

## 2022-02-02 PROCEDURE — 1101F PT FALLS ASSESS-DOCD LE1/YR: CPT | Mod: CPTII,S$GLB,, | Performed by: FAMILY MEDICINE

## 2022-02-02 PROCEDURE — 1159F PR MEDICATION LIST DOCUMENTED IN MEDICAL RECORD: ICD-10-PCS | Mod: CPTII,S$GLB,, | Performed by: FAMILY MEDICINE

## 2022-02-02 PROCEDURE — 4010F ACE/ARB THERAPY RXD/TAKEN: CPT | Mod: CPTII,S$GLB,, | Performed by: FAMILY MEDICINE

## 2022-02-02 PROCEDURE — 3288F PR FALLS RISK ASSESSMENT DOCUMENTED: ICD-10-PCS | Mod: CPTII,S$GLB,, | Performed by: FAMILY MEDICINE

## 2022-02-02 PROCEDURE — 4010F PR ACE/ARB THEARPY RXD/TAKEN: ICD-10-PCS | Mod: CPTII,S$GLB,, | Performed by: FAMILY MEDICINE

## 2022-02-02 PROCEDURE — 3078F DIAST BP <80 MM HG: CPT | Mod: CPTII,S$GLB,, | Performed by: FAMILY MEDICINE

## 2022-02-02 PROCEDURE — 3074F PR MOST RECENT SYSTOLIC BLOOD PRESSURE < 130 MM HG: ICD-10-PCS | Mod: CPTII,S$GLB,, | Performed by: FAMILY MEDICINE

## 2022-02-02 PROCEDURE — 3072F PR LOW RISK FOR RETINOPATHY: ICD-10-PCS | Mod: CPTII,S$GLB,, | Performed by: FAMILY MEDICINE

## 2022-02-02 PROCEDURE — 99999 PR PBB SHADOW E&M-EST. PATIENT-LVL IV: ICD-10-PCS | Mod: PBBFAC,,, | Performed by: FAMILY MEDICINE

## 2022-02-02 PROCEDURE — 3078F PR MOST RECENT DIASTOLIC BLOOD PRESSURE < 80 MM HG: ICD-10-PCS | Mod: CPTII,S$GLB,, | Performed by: FAMILY MEDICINE

## 2022-02-02 PROCEDURE — 1160F RVW MEDS BY RX/DR IN RCRD: CPT | Mod: CPTII,S$GLB,, | Performed by: FAMILY MEDICINE

## 2022-02-02 PROCEDURE — 3008F PR BODY MASS INDEX (BMI) DOCUMENTED: ICD-10-PCS | Mod: CPTII,S$GLB,, | Performed by: FAMILY MEDICINE

## 2022-02-02 PROCEDURE — 99999 PR PBB SHADOW E&M-EST. PATIENT-LVL IV: CPT | Mod: PBBFAC,,, | Performed by: FAMILY MEDICINE

## 2022-02-02 PROCEDURE — 99214 PR OFFICE/OUTPT VISIT, EST, LEVL IV, 30-39 MIN: ICD-10-PCS | Mod: S$GLB,,, | Performed by: FAMILY MEDICINE

## 2022-02-02 RX ORDER — AMLODIPINE BESYLATE AND BENAZEPRIL HYDROCHLORIDE 2.5; 1 MG/1; MG/1
1 CAPSULE ORAL DAILY
Qty: 90 CAPSULE | Refills: 2 | Status: SHIPPED | OUTPATIENT
Start: 2022-02-02 | End: 2022-09-12 | Stop reason: SDUPTHER

## 2022-02-02 RX ORDER — LANCETS
EACH MISCELLANEOUS
Qty: 200 EACH | Refills: 11 | Status: SHIPPED | OUTPATIENT
Start: 2022-02-02 | End: 2023-12-11

## 2022-02-02 RX ORDER — ATORVASTATIN CALCIUM 40 MG/1
40 TABLET, FILM COATED ORAL NIGHTLY
Qty: 90 TABLET | Refills: 2 | Status: SHIPPED | OUTPATIENT
Start: 2022-02-02 | End: 2023-01-24 | Stop reason: SDUPTHER

## 2022-02-02 RX ORDER — METFORMIN HYDROCHLORIDE 500 MG/1
1000 TABLET ORAL 2 TIMES DAILY WITH MEALS
Qty: 360 TABLET | Refills: 2 | Status: SHIPPED | OUTPATIENT
Start: 2022-02-02 | End: 2022-09-12 | Stop reason: SDUPTHER

## 2022-02-02 NOTE — ASSESSMENT & PLAN NOTE
"She reports very significant dietary improvements and increased activity, resulting in intentional weight-loss, augmented by Ozempic.  Estimated body mass index is 30.85 kg/m² as calculated from the following:    Height as of this encounter: 5' 3" (1.6 m).    Weight as of this encounter: 79 kg (174 lb 2.6 oz).   Wt Readings from Last 10 Encounters:   02/02/22 79 kg (174 lb 2.6 oz)   12/13/21 78.7 kg (173 lb 8 oz)   11/26/21 88.9 kg (196 lb)   11/15/21 81.2 kg (179 lb 0.2 oz)   08/16/21 89.5 kg (197 lb 5 oz)   08/02/21 90.7 kg (199 lb 15.3 oz)   06/28/21 94.9 kg (209 lb 3.5 oz)   06/21/21 95.7 kg (211 lb)   06/15/21 96 kg (211 lb 10.3 oz)   06/14/21 95.9 kg (211 lb 6.7 oz)     "

## 2022-02-02 NOTE — ASSESSMENT & PLAN NOTE
Lab Results   Component Value Date    CHOL 159 06/21/2021    CHOL 200 (H) 11/02/2020    TRIG 126 06/21/2021    TRIG 143 11/02/2020    HDL 43 06/21/2021    HDL 51 11/02/2020    LDLCALC 90.8 06/21/2021    LDLCALC 120.4 11/02/2020    NONHDLCHOL 116 06/21/2021    NONHDLCHOL 149 11/02/2020    AST 16 12/13/2021    ALT 17 12/13/2021     The 10-year ASCVD risk score (Stella NGUYEN Jr., et al., 2013) is: 16.4%    Values used to calculate the score:      Age: 66 years      Sex: Female      Is Non- : No      Diabetic: Yes      Tobacco smoker: No      Systolic Blood Pressure: 120 mmHg      Is BP treated: Yes      HDL Cholesterol: 43 mg/dL      Total Cholesterol: 159 mg/dL

## 2022-02-02 NOTE — PROGRESS NOTES
OFFICE VISIT 2/2/22  8:30 AM CST  LAST ENCOUNTER WITH ME:  6/28/2021   CHIEF COMPLAINT: Follow-up    May appears to doing well on her current medicines. May reports preceiving no adverse side-effects and wants to continue current treatment.     DIAGNOSES SPECIFICALLY EVALUATED AND TREATED THIS ENCOUNTER  1. Type 2 diabetes mellitus with diabetic polyneuropathy, without long-term current use of insulin  - metFORMIN (GLUCOPHAGE) 500 MG tablet; Take 2 tablets (1,000 mg total) by mouth 2 (two) times daily with meals.  Dispense: 360 tablet; Refill: 2  - Microalbumin/Creatinine Ratio, Urine    2. Hyperlipidemia associated with type 2 diabetes mellitus  - atorvastatin (LIPITOR) 40 MG tablet; Take 1 tablet (40 mg total) by mouth every evening.  Dispense: 90 tablet; Refill: 2    3. Hypertension associated with diabetes  - amlodipine-benazepril 2.5-10 mg (LOTREL) 2.5-10 mg per capsule; Take 1 capsule by mouth once daily.  Dispense: 90 capsule; Refill: 2    4. Weight loss    5. History of COVID-19 (December, 2021)    6. Type 2 diabetes mellitus with other specified complication, without long-term current use of insulin  - metFORMIN (GLUCOPHAGE) 500 MG tablet; Take 2 tablets (1,000 mg total) by mouth 2 (two) times daily with meals.  Dispense: 360 tablet; Refill: 2  - semaglutide (OZEMPIC) 0.25 mg or 0.5 mg(2 mg/1.5 mL) pen injector; Inject 0.5 mg into the skin every 7 days. for 999 doses  Dispense: 4 pen; Refill: 1  - lancets Misc; Check blood glucose 1-2 times daily (BEFORE breakfast or other meal) and as needed for symptoms of low or high blood glucose  Dispense: 200 each; Refill: 11  - blood sugar diagnostic Strp; Check blood glucose 1-2 times daily (BEFORE breakfast or other meal) and as needed for symptoms of low or high blood glucose  Dispense: 200 each; Refill: 11    7. Type 2 diabetes mellitus with hyperglycemia, without long-term current use of insulin    8. Hypertension associated with diabetes  -  amlodipine-benazepril 2.5-10 mg (LOTREL) 2.5-10 mg per capsule; Take 1 capsule by mouth once daily.  Dispense: 90 capsule; Refill: 2    9. Seronegative rheumatoid arthritis    10. Immunocompromised    11. Peripheral vascular disease, unspecified    12. Breast cancer screening by mammogram  - Mammo Digital Screening Bilat w/ Dilan; Future    13. Asymptomatic menopausal state  - DXA Bone Density Spine And Hip; Future    14. Need for COVID-19 vaccine     Follow up in about 4 months (around 6/6/2022) for periodic management of chronic medical conditions.    Problem List Items Addressed This Visit     Seronegative rheumatoid arthritis    Hypertension associated with diabetes (Chronic)    Current Assessment & Plan     BP Readings from Last 6 Encounters:   02/02/22 120/60   12/13/21 127/81   11/26/21 124/69   08/02/21 133/72   06/28/21 130/70   06/21/21 (!) 156/72            Relevant Medications    metFORMIN (GLUCOPHAGE) 500 MG tablet    semaglutide (OZEMPIC) 0.25 mg or 0.5 mg(2 mg/1.5 mL) pen injector    amlodipine-benazepril 2.5-10 mg (LOTREL) 2.5-10 mg per capsule    Hyperlipidemia associated with type 2 diabetes mellitus (Chronic)    Current Assessment & Plan     Lab Results   Component Value Date    CHOL 159 06/21/2021    CHOL 200 (H) 11/02/2020    TRIG 126 06/21/2021    TRIG 143 11/02/2020    HDL 43 06/21/2021    HDL 51 11/02/2020    LDLCALC 90.8 06/21/2021    LDLCALC 120.4 11/02/2020    NONHDLCHOL 116 06/21/2021    NONHDLCHOL 149 11/02/2020    AST 16 12/13/2021    ALT 17 12/13/2021     The 10-year ASCVD risk score (Stella NGUYEN Jr., et al., 2013) is: 16.4%    Values used to calculate the score:      Age: 66 years      Sex: Female      Is Non- : No      Diabetic: Yes      Tobacco smoker: No      Systolic Blood Pressure: 120 mmHg      Is BP treated: Yes      HDL Cholesterol: 43 mg/dL      Total Cholesterol: 159 mg/dL          Relevant Medications    atorvastatin (LIPITOR) 40 MG tablet    metFORMIN  (GLUCOPHAGE) 500 MG tablet    semaglutide (OZEMPIC) 0.25 mg or 0.5 mg(2 mg/1.5 mL) pen injector    Peripheral vascular disease, unspecified    Current Assessment & Plan     Asymptomatic. Clinically stable. No claudication.         Type 2 diabetes mellitus with diabetic polyneuropathy, without long-term current use of insulin - Primary (Chronic)    Current Assessment & Plan     Diabetes Management Status    Statin: Taking  ACE/ARB: Taking    Screening or Prevention Patient's value Goal Complete/Controlled?   HgA1C Testing and Control   Lab Results   Component Value Date    HGBA1C 6.0 (H) 12/13/2021      Annually/Less than 8% Yes   Lipid profile : 06/21/2021 Annually Yes   LDL control Lab Results   Component Value Date    LDLCALC 90.8 06/21/2021    Annually/Less than 100 mg/dl  Yes   Nephropathy screening Lab Results   Component Value Date    LABMICR 5.0 03/29/2021     Lab Results   Component Value Date    PROTEINUA Negative 05/26/2020    Annually Yes   Blood pressure BP Readings from Last 1 Encounters:   02/02/22 120/60    Less than 140/90 Yes   Dilated retinal exam : 08/16/2021 Annually Yes   Foot exam   : 02/02/2022 Annually Yes     Lab Results   Component Value Date    HGBA1C 6.0 (H) 12/13/2021    HGBA1C 6.7 (H) 08/16/2021    HGBA1C 8.1 (H) 06/21/2021    ESTGFRAFRICA >60 12/13/2021    EGFRNONAA >60 12/13/2021    MICALBCREAT 6.3 03/29/2021    LDLCALC 90.8 06/21/2021     Last 6 Patient Entered Readings                                          Most Recent A1c:     There is no flowsheet data to display.        HEALTH MAINTENANCE: Diabetic health maintenance interventions reviewed and are up to date except for:  There are no preventive care reminders to display for this patient.   DIABETIC FOOT EXAM: Inspection of feet reveals no open wounds, ulcerations, significant calluses, or evidence of arterial insufficiency. 10g monofilament sensation is intact in all 5 locations tested.          Relevant Medications     "metFORMIN (GLUCOPHAGE) 500 MG tablet    semaglutide (OZEMPIC) 0.25 mg or 0.5 mg(2 mg/1.5 mL) pen injector    Other Relevant Orders    Microalbumin/Creatinine Ratio, Urine    Weight loss    Current Assessment & Plan     She reports very significant dietary improvements and increased activity, resulting in intentional weight-loss, augmented by Ozempic.  Estimated body mass index is 30.85 kg/m² as calculated from the following:    Height as of this encounter: 5' 3" (1.6 m).    Weight as of this encounter: 79 kg (174 lb 2.6 oz).   Wt Readings from Last 10 Encounters:   02/02/22 79 kg (174 lb 2.6 oz)   12/13/21 78.7 kg (173 lb 8 oz)   11/26/21 88.9 kg (196 lb)   11/15/21 81.2 kg (179 lb 0.2 oz)   08/16/21 89.5 kg (197 lb 5 oz)   08/02/21 90.7 kg (199 lb 15.3 oz)   06/28/21 94.9 kg (209 lb 3.5 oz)   06/21/21 95.7 kg (211 lb)   06/15/21 96 kg (211 lb 10.3 oz)   06/14/21 95.9 kg (211 lb 6.7 oz)            Immunocompromised    Overview     From rheumatologic meds.         Current Assessment & Plan     No S/Sx infection.         History of COVID-19 (December, 2021)    Current Assessment & Plan     This condition appears to be RESOLVED. She reports no residual symptoms or impairment.            Other Visit Diagnoses     Type 2 diabetes mellitus with other specified complication, without long-term current use of insulin  (Chronic)       Relevant Medications    metFORMIN (GLUCOPHAGE) 500 MG tablet    semaglutide (OZEMPIC) 0.25 mg or 0.5 mg(2 mg/1.5 mL) pen injector    lancets Misc    blood sugar diagnostic Strp    Type 2 diabetes mellitus with hyperglycemia, without long-term current use of insulin  (Chronic)       Relevant Medications    metFORMIN (GLUCOPHAGE) 500 MG tablet    semaglutide (OZEMPIC) 0.25 mg or 0.5 mg(2 mg/1.5 mL) pen injector    Breast cancer screening by mammogram        Relevant Orders    Mammo Digital Screening Bilat w/ Dilan    Asymptomatic menopausal state        Relevant Orders    DXA Bone Density Spine And " Hip    Need for COVID-19 vaccine          Unless noted herein, any chronic conditions are represented as and appear compensated/controlled and stable, and no other significant complaints or concerns were reported.    PRESCRIPTION DRUG MANAGEMENT  Outpatient Medications Prior to Visit   Medication Sig Dispense Refill    aspirin (ECOTRIN) 81 MG EC tablet Take 81 mg by mouth once daily.      blood-glucose meter kit Check blood glucose 1-2 times daily (BEFORE breakfast or other meal) and as needed for symptoms of low or high blood glucose 1 each 0    celecoxib (CELEBREX) 200 MG capsule Take 1 capsule by mouth 2 times daily. 180 capsule 1    cetirizine (ZYRTEC) 5 MG tablet Take 5 mg by mouth once daily.      cholecalciferol, vitamin D3, (VITAMIN D3) 50 mcg (2,000 unit) Cap Take 1 capsule by mouth once daily.      fluticasone propionate (FLONASE) 50 mcg/actuation nasal spray 2 sprays (100 mcg total) by Each Nostril route once daily. 16 g 0    folic acid (FOLVITE) 1 MG tablet Take 1 tablet (1 mg total) by mouth once daily. 90 tablet 2    GLUCOSAMINE/D3/BOSWELLIA MAYELIN (OSTEO BI-FLEX, 5-LOXIN, ORAL) Take 2 tablets by mouth once daily.      metaxalone (SKELAXIN) 800 MG tablet TAKE 1 TABLET TWICE DAILY AS NEEDED FOR SPASMS  1    methotrexate 2.5 MG Tab Take 4 tablets (10 mg total) by mouth every 7 days. 16 tablet 3    multivit-mineral-iron-lutein Tab Take 1 tablet by mouth once daily.      amlodipine-benazepril 2.5-10 mg (LOTREL) 2.5-10 mg per capsule Take 1 capsule by mouth once daily. 90 capsule 0    atorvastatin (LIPITOR) 40 MG tablet Take 1 tablet (40 mg total) by mouth every evening. 90 tablet 3    blood sugar diagnostic Strp Check blood glucose 1-2 times daily (BEFORE breakfast or other meal) and as needed for symptoms of low or high blood glucose 200 each 11    lancets Misc Check blood glucose 1-2 times daily (BEFORE breakfast or other meal) and as needed for symptoms of low or high blood glucose 200  each 11    metFORMIN (GLUCOPHAGE) 500 MG tablet Take 2 tablets (1,000 mg total) by mouth 2 (two) times daily with meals. 360 tablet 4    semaglutide (OZEMPIC) 0.25 mg or 0.5 mg(2 mg/1.5 mL) pen injector Inject 0.5 mg into the skin every 7 days. for 999 doses 4 pen 1     No facility-administered medications prior to visit.     Medications Discontinued During This Encounter   Medication Reason    atorvastatin (LIPITOR) 40 MG tablet Reorder    metFORMIN (GLUCOPHAGE) 500 MG tablet Reorder    lancets Misc Reorder    blood sugar diagnostic Strp Reorder    semaglutide (OZEMPIC) 0.25 mg or 0.5 mg(2 mg/1.5 mL) pen injector Reorder    amlodipine-benazepril 2.5-10 mg (LOTREL) 2.5-10 mg per capsule Reorder     Medications Ordered This Encounter   Medications    amlodipine-benazepril 2.5-10 mg (LOTREL) 2.5-10 mg per capsule     Sig: Take 1 capsule by mouth once daily.     Dispense:  90 capsule     Refill:  2     -    atorvastatin (LIPITOR) 40 MG tablet     Sig: Take 1 tablet (40 mg total) by mouth every evening.     Dispense:  90 tablet     Refill:  2     -    blood sugar diagnostic Strp     Sig: Check blood glucose 1-2 times daily (BEFORE breakfast or other meal) and as needed for symptoms of low or high blood glucose     Dispense:  200 each     Refill:  11     Dispense supplies corresponding with patient's glucometer.    lancets Misc     Sig: Check blood glucose 1-2 times daily (BEFORE breakfast or other meal) and as needed for symptoms of low or high blood glucose     Dispense:  200 each     Refill:  11    metFORMIN (GLUCOPHAGE) 500 MG tablet     Sig: Take 2 tablets (1,000 mg total) by mouth 2 (two) times daily with meals.     Dispense:  360 tablet     Refill:  2     -    semaglutide (OZEMPIC) 0.25 mg or 0.5 mg(2 mg/1.5 mL) pen injector     Sig: Inject 0.5 mg into the skin every 7 days. for 999 doses     Dispense:  4 pen     Refill:  1     Review of Systems   Respiratory: Negative for chest tightness and  "shortness of breath.    Cardiovascular: Negative for chest pain.   Endocrine: Negative for polydipsia and polyuria.      PHYSICAL EXAM  Vitals:    02/02/22 0857   BP: 120/60   BP Location: Left arm   Patient Position: Sitting   BP Method: Medium (Manual)   Pulse: 93   Temp: 97.7 °F (36.5 °C)   TempSrc: Tympanic   SpO2: 98%   Weight: 79 kg (174 lb 2.6 oz)   Height: 5' 3" (1.6 m)   CONSTITUTIONAL: Vital signs noted. No apparent distress. Does not appear acutely ill or septic. Appears adequately hydrated.  PULM: Lungs clear. Breathing unlabored.  HEART: Auscultation reveals regular rate.  DERM: Skin warm and moist with normal turgor.  NEURO: There are no gross focal motor deficits.  PSYCHIATRIC: Alert and conversant. Mood grossly neutral. Judgment and insight grossly intact.      Documentation entered by me for this encounter may have been done in part using speech-recognition technology. Although I have made an effort to ensure accuracy, "sound like" errors may exist and should be interpreted in context.   "

## 2022-02-02 NOTE — ASSESSMENT & PLAN NOTE
BP Readings from Last 6 Encounters:   02/02/22 120/60   12/13/21 127/81   11/26/21 124/69   08/02/21 133/72   06/28/21 130/70   06/21/21 (!) 156/72

## 2022-02-02 NOTE — PATIENT INSTRUCTIONS
"I strongly recommend that you get your COVID-19 vaccine booster. I've already had mine.    The booster shot is necessary to be considered "fully vaccinated" against COVID-19. People who are fully vaccinated are much better protected from severe illness from COVID-19 than people who are unvaccinated or not fully vaccinated.    If your primary COVID-19 vaccine series was Pfizer, you should get a Pfizer booster.    The quickest and easiest way to schedule an appointment for your COVID-19 booster vaccination is from the [employer] navin or your [facility] account online at [email or web address]. You can also schedule an appointment for your COVID-19 vaccination by calling [phone].    Please take care of yourself. You are important to me!  "

## 2022-02-02 NOTE — ASSESSMENT & PLAN NOTE
Diabetes Management Status    Statin: Taking  ACE/ARB: Taking    Screening or Prevention Patient's value Goal Complete/Controlled?   HgA1C Testing and Control   Lab Results   Component Value Date    HGBA1C 6.0 (H) 12/13/2021      Annually/Less than 8% Yes   Lipid profile : 06/21/2021 Annually Yes   LDL control Lab Results   Component Value Date    LDLCALC 90.8 06/21/2021    Annually/Less than 100 mg/dl  Yes   Nephropathy screening Lab Results   Component Value Date    LABMICR 5.0 03/29/2021     Lab Results   Component Value Date    PROTEINUA Negative 05/26/2020    Annually Yes   Blood pressure BP Readings from Last 1 Encounters:   02/02/22 120/60    Less than 140/90 Yes   Dilated retinal exam : 08/16/2021 Annually Yes   Foot exam   : 02/02/2022 Annually Yes     Lab Results   Component Value Date    HGBA1C 6.0 (H) 12/13/2021    HGBA1C 6.7 (H) 08/16/2021    HGBA1C 8.1 (H) 06/21/2021    ESTGFRAFRICA >60 12/13/2021    EGFRNONAA >60 12/13/2021    MICALBCREAT 6.3 03/29/2021    LDLCALC 90.8 06/21/2021     Last 6 Patient Entered Readings                                          Most Recent A1c:     There is no flowsheet data to display.        HEALTH MAINTENANCE: Diabetic health maintenance interventions reviewed and are up to date except for:  There are no preventive care reminders to display for this patient.   DIABETIC FOOT EXAM: Inspection of feet reveals no open wounds, ulcerations, significant calluses, or evidence of arterial insufficiency. 10g monofilament sensation is intact in all 5 locations tested.

## 2022-02-07 ENCOUNTER — LAB VISIT (OUTPATIENT)
Dept: LAB | Facility: HOSPITAL | Age: 67
End: 2022-02-07
Attending: FAMILY MEDICINE
Payer: MEDICARE

## 2022-02-07 DIAGNOSIS — E11.42 TYPE 2 DIABETES MELLITUS WITH DIABETIC POLYNEUROPATHY, WITHOUT LONG-TERM CURRENT USE OF INSULIN: Chronic | ICD-10-CM

## 2022-02-07 LAB
ALBUMIN/CREAT UR: 10.9 UG/MG (ref 0–30)
CREAT UR-MCNC: 101 MG/DL (ref 15–325)
MICROALBUMIN UR DL<=1MG/L-MCNC: 11 UG/ML

## 2022-02-07 PROCEDURE — 82043 UR ALBUMIN QUANTITATIVE: CPT | Performed by: FAMILY MEDICINE

## 2022-02-07 PROCEDURE — 82570 ASSAY OF URINE CREATININE: CPT | Performed by: FAMILY MEDICINE

## 2022-03-11 ENCOUNTER — TELEPHONE (OUTPATIENT)
Dept: RHEUMATOLOGY | Facility: CLINIC | Age: 67
End: 2022-03-11
Payer: MEDICARE

## 2022-03-13 ENCOUNTER — PATIENT OUTREACH (OUTPATIENT)
Dept: ADMINISTRATIVE | Facility: OTHER | Age: 67
End: 2022-03-13
Payer: MEDICARE

## 2022-03-14 ENCOUNTER — LAB VISIT (OUTPATIENT)
Dept: LAB | Facility: HOSPITAL | Age: 67
End: 2022-03-14
Attending: FAMILY MEDICINE
Payer: MEDICARE

## 2022-03-14 ENCOUNTER — OFFICE VISIT (OUTPATIENT)
Dept: RHEUMATOLOGY | Facility: CLINIC | Age: 67
End: 2022-03-14
Payer: MEDICARE

## 2022-03-14 VITALS — DIASTOLIC BLOOD PRESSURE: 63 MMHG | SYSTOLIC BLOOD PRESSURE: 97 MMHG | HEART RATE: 79 BPM

## 2022-03-14 DIAGNOSIS — Z79.899 LONG TERM CURRENT USE OF IMMUNOSUPPRESSIVE DRUG: ICD-10-CM

## 2022-03-14 DIAGNOSIS — D84.9 IMMUNOCOMPROMISED: ICD-10-CM

## 2022-03-14 DIAGNOSIS — M06.00 SERONEGATIVE RHEUMATOID ARTHRITIS: Primary | ICD-10-CM

## 2022-03-14 DIAGNOSIS — M19.042 PRIMARY OSTEOARTHRITIS OF BOTH HANDS: ICD-10-CM

## 2022-03-14 DIAGNOSIS — M15.9 GENERALIZED OSTEOARTHRITIS OF MULTIPLE SITES: ICD-10-CM

## 2022-03-14 DIAGNOSIS — M19.041 PRIMARY OSTEOARTHRITIS OF BOTH HANDS: ICD-10-CM

## 2022-03-14 LAB
ALBUMIN SERPL BCP-MCNC: 4 G/DL (ref 3.5–5.2)
ALP SERPL-CCNC: 49 U/L (ref 55–135)
ALT SERPL W/O P-5'-P-CCNC: 21 U/L (ref 10–44)
ANION GAP SERPL CALC-SCNC: 10 MMOL/L (ref 8–16)
AST SERPL-CCNC: 17 U/L (ref 10–40)
BASOPHILS # BLD AUTO: 0.04 K/UL (ref 0–0.2)
BASOPHILS NFR BLD: 0.6 % (ref 0–1.9)
BILIRUB SERPL-MCNC: 0.4 MG/DL (ref 0.1–1)
BUN SERPL-MCNC: 11 MG/DL (ref 8–23)
CALCIUM SERPL-MCNC: 10.2 MG/DL (ref 8.7–10.5)
CHLORIDE SERPL-SCNC: 102 MMOL/L (ref 95–110)
CO2 SERPL-SCNC: 28 MMOL/L (ref 23–29)
CREAT SERPL-MCNC: 0.6 MG/DL (ref 0.5–1.4)
CRP SERPL-MCNC: 2 MG/L (ref 0–8.2)
DIFFERENTIAL METHOD: ABNORMAL
EOSINOPHIL # BLD AUTO: 0.2 K/UL (ref 0–0.5)
EOSINOPHIL NFR BLD: 3.1 % (ref 0–8)
ERYTHROCYTE [DISTWIDTH] IN BLOOD BY AUTOMATED COUNT: 13.1 % (ref 11.5–14.5)
EST. GFR  (AFRICAN AMERICAN): >60 ML/MIN/1.73 M^2
EST. GFR  (NON AFRICAN AMERICAN): >60 ML/MIN/1.73 M^2
GLUCOSE SERPL-MCNC: 94 MG/DL (ref 70–110)
HCT VFR BLD AUTO: 40.2 % (ref 37–48.5)
HGB BLD-MCNC: 13.5 G/DL (ref 12–16)
IMM GRANULOCYTES # BLD AUTO: 0.02 K/UL (ref 0–0.04)
IMM GRANULOCYTES NFR BLD AUTO: 0.3 % (ref 0–0.5)
LYMPHOCYTES # BLD AUTO: 1.5 K/UL (ref 1–4.8)
LYMPHOCYTES NFR BLD: 24 % (ref 18–48)
MCH RBC QN AUTO: 31.5 PG (ref 27–31)
MCHC RBC AUTO-ENTMCNC: 33.6 G/DL (ref 32–36)
MCV RBC AUTO: 94 FL (ref 82–98)
MONOCYTES # BLD AUTO: 0.4 K/UL (ref 0.3–1)
MONOCYTES NFR BLD: 6.4 % (ref 4–15)
NEUTROPHILS # BLD AUTO: 4.2 K/UL (ref 1.8–7.7)
NEUTROPHILS NFR BLD: 65.6 % (ref 38–73)
NRBC BLD-RTO: 0 /100 WBC
PLATELET # BLD AUTO: 262 K/UL (ref 150–450)
PMV BLD AUTO: 9.9 FL (ref 9.2–12.9)
POTASSIUM SERPL-SCNC: 4.7 MMOL/L (ref 3.5–5.1)
PROT SERPL-MCNC: 6.9 G/DL (ref 6–8.4)
RBC # BLD AUTO: 4.29 M/UL (ref 4–5.4)
SODIUM SERPL-SCNC: 140 MMOL/L (ref 136–145)
WBC # BLD AUTO: 6.41 K/UL (ref 3.9–12.7)

## 2022-03-14 PROCEDURE — G0008 FLU VACCINE - QUADRIVALENT - ADJUVANTED: ICD-10-PCS | Mod: S$GLB,,, | Performed by: PHYSICIAN ASSISTANT

## 2022-03-14 PROCEDURE — 3066F PR DOCUMENTATION OF TREATMENT FOR NEPHROPATHY: ICD-10-PCS | Mod: CPTII,S$GLB,, | Performed by: PHYSICIAN ASSISTANT

## 2022-03-14 PROCEDURE — 3066F NEPHROPATHY DOC TX: CPT | Mod: CPTII,S$GLB,, | Performed by: PHYSICIAN ASSISTANT

## 2022-03-14 PROCEDURE — 80053 COMPREHEN METABOLIC PANEL: CPT | Performed by: PHYSICIAN ASSISTANT

## 2022-03-14 PROCEDURE — 3072F PR LOW RISK FOR RETINOPATHY: ICD-10-PCS | Mod: CPTII,S$GLB,, | Performed by: PHYSICIAN ASSISTANT

## 2022-03-14 PROCEDURE — 3078F PR MOST RECENT DIASTOLIC BLOOD PRESSURE < 80 MM HG: ICD-10-PCS | Mod: CPTII,S$GLB,, | Performed by: PHYSICIAN ASSISTANT

## 2022-03-14 PROCEDURE — 3061F PR NEG MICROALBUMINURIA RESULT DOCUMENTED/REVIEW: ICD-10-PCS | Mod: CPTII,S$GLB,, | Performed by: PHYSICIAN ASSISTANT

## 2022-03-14 PROCEDURE — 3074F PR MOST RECENT SYSTOLIC BLOOD PRESSURE < 130 MM HG: ICD-10-PCS | Mod: CPTII,S$GLB,, | Performed by: PHYSICIAN ASSISTANT

## 2022-03-14 PROCEDURE — 3074F SYST BP LT 130 MM HG: CPT | Mod: CPTII,S$GLB,, | Performed by: PHYSICIAN ASSISTANT

## 2022-03-14 PROCEDURE — 99999 PR PBB SHADOW E&M-EST. PATIENT-LVL I: CPT | Mod: PBBFAC,,, | Performed by: PHYSICIAN ASSISTANT

## 2022-03-14 PROCEDURE — 3061F NEG MICROALBUMINURIA REV: CPT | Mod: CPTII,S$GLB,, | Performed by: PHYSICIAN ASSISTANT

## 2022-03-14 PROCEDURE — 3078F DIAST BP <80 MM HG: CPT | Mod: CPTII,S$GLB,, | Performed by: PHYSICIAN ASSISTANT

## 2022-03-14 PROCEDURE — 86140 C-REACTIVE PROTEIN: CPT | Performed by: PHYSICIAN ASSISTANT

## 2022-03-14 PROCEDURE — 85652 RBC SED RATE AUTOMATED: CPT | Performed by: PHYSICIAN ASSISTANT

## 2022-03-14 PROCEDURE — 99999 PR PBB SHADOW E&M-EST. PATIENT-LVL I: ICD-10-PCS | Mod: PBBFAC,,, | Performed by: PHYSICIAN ASSISTANT

## 2022-03-14 PROCEDURE — 4010F ACE/ARB THERAPY RXD/TAKEN: CPT | Mod: CPTII,S$GLB,, | Performed by: PHYSICIAN ASSISTANT

## 2022-03-14 PROCEDURE — 85025 COMPLETE CBC W/AUTO DIFF WBC: CPT | Performed by: PHYSICIAN ASSISTANT

## 2022-03-14 PROCEDURE — 99215 PR OFFICE/OUTPT VISIT, EST, LEVL V, 40-54 MIN: ICD-10-PCS | Mod: 25,S$GLB,, | Performed by: PHYSICIAN ASSISTANT

## 2022-03-14 PROCEDURE — 3072F LOW RISK FOR RETINOPATHY: CPT | Mod: CPTII,S$GLB,, | Performed by: PHYSICIAN ASSISTANT

## 2022-03-14 PROCEDURE — 90694 VACC AIIV4 NO PRSRV 0.5ML IM: CPT | Mod: S$GLB,,, | Performed by: PHYSICIAN ASSISTANT

## 2022-03-14 PROCEDURE — 90694 FLU VACCINE - QUADRIVALENT - ADJUVANTED: ICD-10-PCS | Mod: S$GLB,,, | Performed by: PHYSICIAN ASSISTANT

## 2022-03-14 PROCEDURE — 4010F PR ACE/ARB THEARPY RXD/TAKEN: ICD-10-PCS | Mod: CPTII,S$GLB,, | Performed by: PHYSICIAN ASSISTANT

## 2022-03-14 PROCEDURE — 99215 OFFICE O/P EST HI 40 MIN: CPT | Mod: 25,S$GLB,, | Performed by: PHYSICIAN ASSISTANT

## 2022-03-14 PROCEDURE — G0008 ADMIN INFLUENZA VIRUS VAC: HCPCS | Mod: S$GLB,,, | Performed by: PHYSICIAN ASSISTANT

## 2022-03-14 RX ORDER — METHOTREXATE 2.5 MG/1
15 TABLET ORAL
Qty: 24 TABLET | Refills: 3 | Status: SHIPPED | OUTPATIENT
Start: 2022-03-14 | End: 2022-08-16

## 2022-03-14 NOTE — PROGRESS NOTES
RHEUMATOLOGY OUTPATIENT CLINIC NOTE    3/13/2022    Attending Rheumatologist: Daysi Montes PA-C  Primary Care Provider: JAMES Lane MD   Chief Complaint/Reason For Consultation:  Disease management      Subjective:        May Montoya is a 66 y.o. female here today for 3 mo follow up seronegative rheumatoid arthritis with MCP joint synovitis and was given methotrexate 10 mg/wk.  She took it for 4 weeks noted significant improvement and discontinued since she ran out of refills.  Reports worsening pain and swelling associated with stiffness of her hands. She was restarted on 10 mg/wk at last visit. Does have improvement again but c/o pain at bilateral CMC joints. Tolerating MTX w/o SE. Rheumatological review of system negative otherwise.  Superimposed osteoarthritic changes small and large joints.    Serologies    Lab Results   Component Value Date    RF <10.0 06/15/2021      Lab Results   Component Value Date    HEPCAB Negative 06/28/2016       Current Rheum Medications:  · MTX, celebrex  Previous Rheum Medications:   · n/a    Past Medical History:   Diagnosis Date    Arthritis     Diabetes mellitus     Herniated disc, cervical     Hypertension     Type 2 diabetes mellitus with diabetic polyneuropathy, without long-term current use of insulin 3/29/2021    Type 2 diabetes mellitus with other specified complication 9/28/2015     Social History     Socioeconomic History    Marital status:    Tobacco Use    Smoking status: Never Smoker    Smokeless tobacco: Never Used   Substance and Sexual Activity    Alcohol use: No    Drug use: No    Sexual activity: Never     Review of patient's allergies indicates:   Allergen Reactions    Avelox [moxifloxacin]        Objective:   BP 97/63   Pulse 79   LMP  (LMP Unknown)     Immunization History   Administered Date(s) Administered    COVID-19, MRNA, LN-S, PF (Pfizer) (Purple Cap) 02/12/2021, 03/05/2021    Influenza (FLUAD) - Quadrivalent  - Adjuvanted - PF *Preferred* (65+) 09/30/2020    Influenza - High Dose - PF (65 years and older) 10/28/2019    Pneumococcal Conjugate - 13 Valent 09/24/2018, 11/16/2020    Pneumococcal Polysaccharide - 23 Valent 06/28/2016    Tdap 03/27/2018    Zoster Recombinant 10/05/2020, 04/05/2021       Current Outpatient Medications:     amlodipine-benazepril 2.5-10 mg (LOTREL) 2.5-10 mg per capsule, Take 1 capsule by mouth once daily., Disp: 90 capsule, Rfl: 2    aspirin (ECOTRIN) 81 MG EC tablet, Take 81 mg by mouth once daily., Disp: , Rfl:     atorvastatin (LIPITOR) 40 MG tablet, Take 1 tablet (40 mg total) by mouth every evening., Disp: 90 tablet, Rfl: 2    blood sugar diagnostic Strp, Check blood glucose 1-2 times daily (BEFORE breakfast or other meal) and as needed for symptoms of low or high blood glucose, Disp: 200 each, Rfl: 11    blood-glucose meter kit, Check blood glucose 1-2 times daily (BEFORE breakfast or other meal) and as needed for symptoms of low or high blood glucose, Disp: 1 each, Rfl: 0    celecoxib (CELEBREX) 200 MG capsule, Take 1 capsule by mouth 2 times daily., Disp: 180 capsule, Rfl: 1    cetirizine (ZYRTEC) 5 MG tablet, Take 5 mg by mouth once daily., Disp: , Rfl:     cholecalciferol, vitamin D3, (VITAMIN D3) 50 mcg (2,000 unit) Cap, Take 1 capsule by mouth once daily., Disp: , Rfl:     fluticasone propionate (FLONASE) 50 mcg/actuation nasal spray, 2 sprays (100 mcg total) by Each Nostril route once daily., Disp: 16 g, Rfl: 0    folic acid (FOLVITE) 1 MG tablet, Take 1 tablet (1 mg total) by mouth once daily., Disp: 90 tablet, Rfl: 2    GLUCOSAMINE/D3/BOSWELLIA MAYELIN (OSTEO BI-FLEX, 5-LOXIN, ORAL), Take 2 tablets by mouth once daily., Disp: , Rfl:     lancets Misc, Check blood glucose 1-2 times daily (BEFORE breakfast or other meal) and as needed for symptoms of low or high blood glucose, Disp: 200 each, Rfl: 11    metaxalone (SKELAXIN) 800 MG tablet, TAKE 1 TABLET TWICE DAILY AS  NEEDED FOR SPASMS, Disp: , Rfl: 1    metFORMIN (GLUCOPHAGE) 500 MG tablet, Take 2 tablets (1,000 mg total) by mouth 2 (two) times daily with meals., Disp: 360 tablet, Rfl: 2    methotrexate 2.5 MG Tab, Take 4 tablets (10 mg total) by mouth every 7 days., Disp: 16 tablet, Rfl: 3    multivit-mineral-iron-lutein Tab, Take 1 tablet by mouth once daily., Disp: , Rfl:     semaglutide (OZEMPIC) 0.25 mg or 0.5 mg(2 mg/1.5 mL) pen injector, Inject 0.5 mg into the skin every 7 days. for 999 doses, Disp: 4 pen, Rfl: 1    Physical Exam   Constitutional: She is oriented to person, place, and time. No distress.   HENT:   Head: Normocephalic and atraumatic.   Pulmonary/Chest: Effort normal.   Abdominal: Normal appearance.   Musculoskeletal:         General: Swelling, tenderness and deformity present. Normal range of motion.      Cervical back: Normal range of motion.   Neurological: She is alert and oriented to person, place, and time.   Skin: Skin is warm and dry.   Psychiatric: Her behavior is normal. Mood normal.   Nursing note and vitals reviewed.      Recent Results (from the past 336 hour(s))   CBC Auto Differential    Collection Time: 03/14/22 12:39 PM   Result Value Ref Range    WBC 6.41 3.90 - 12.70 K/uL    RBC 4.29 4.00 - 5.40 M/uL    Hemoglobin 13.5 12.0 - 16.0 g/dL    Hematocrit 40.2 37.0 - 48.5 %    MCV 94 82 - 98 fL    MCH 31.5 (H) 27.0 - 31.0 pg    MCHC 33.6 32.0 - 36.0 g/dL    RDW 13.1 11.5 - 14.5 %    Platelets 262 150 - 450 K/uL    MPV 9.9 9.2 - 12.9 fL    Immature Granulocytes 0.3 0.0 - 0.5 %    Gran # (ANC) 4.2 1.8 - 7.7 K/uL    Immature Grans (Abs) 0.02 0.00 - 0.04 K/uL    Lymph # 1.5 1.0 - 4.8 K/uL    Mono # 0.4 0.3 - 1.0 K/uL    Eos # 0.2 0.0 - 0.5 K/uL    Baso # 0.04 0.00 - 0.20 K/uL    nRBC 0 0 /100 WBC    Gran % 65.6 38.0 - 73.0 %    Lymph % 24.0 18.0 - 48.0 %    Mono % 6.4 4.0 - 15.0 %    Eosinophil % 3.1 0.0 - 8.0 %    Basophil % 0.6 0.0 - 1.9 %    Differential Method Automated    Comprehensive  Metabolic Panel    Collection Time: 03/14/22 12:39 PM   Result Value Ref Range    Sodium 140 136 - 145 mmol/L    Potassium 4.7 3.5 - 5.1 mmol/L    Chloride 102 95 - 110 mmol/L    CO2 28 23 - 29 mmol/L    Glucose 94 70 - 110 mg/dL    BUN 11 8 - 23 mg/dL    Creatinine 0.6 0.5 - 1.4 mg/dL    Calcium 10.2 8.7 - 10.5 mg/dL    Total Protein 6.9 6.0 - 8.4 g/dL    Albumin 4.0 3.5 - 5.2 g/dL    Total Bilirubin 0.4 0.1 - 1.0 mg/dL    Alkaline Phosphatase 49 (L) 55 - 135 U/L    AST 17 10 - 40 U/L    ALT 21 10 - 44 U/L    Anion Gap 10 8 - 16 mmol/L    eGFR if African American >60 >60 mL/min/1.73 m^2    eGFR if non African American >60 >60 mL/min/1.73 m^2   C-Reactive Protein    Collection Time: 03/14/22 12:39 PM   Result Value Ref Range    CRP 2.0 0.0 - 8.2 mg/L     Imaging:  All imaging reviewed and independently interpreted by me.    XR HAND COMPLETE 3 VIEWS BILATERAL 06/15/21  FINDINGS:  Scattered moderate to advanced multi articular degenerative changes throughout the bilateral hands.  Findings are most pronounced at the bilateral carpometacarpal joints and 5th DIP joints with joint space narrowing and marginal spurring with subchondral sclerosis and osteophyte formation.  No fracture or dislocation.  Soft tissues are symmetric in appearance.    Assessment:     1. Seronegative rheumatoid arthritis    2. Long term current use of immunosuppressive drug    3. Immunocompromised    4. Primary osteoarthritis of both hands    5. Generalized osteoarthritis of multiple sites          Plan:       · Increase dose of MTX to 15 mg per week and cont folic acid daily  · High dose flu shot today  · Compromised immune system secondary to autoimmune disease and use of immunosuppressive drugs. Monitor carefully for infections and toxicities- Currently denies issues with recurrent infections. Advised to get immediate medical care if any infection.  · Recommend Yearly Skin Cancer Screening by Dermatology for all patients on biologic or Da.  advised strict adherence to age appropriate vaccinations (shingles, pneumonia, flu and covid) and cancer screenings with PCP   · Patient advised to hold DMARD and/or biologic therapy for signs of infection or for surgery. If you are unsure what to do please call our office for instruction.[facility] [phone]  · no current medication related issues, no evidence of toxicity. I ordered labs for toxicity monitoring;  results reviewed and discussed findings with the patient   · Return to clinic: 3 mos w/ Dr WILL    The patient understands, chooses and consents to this plan and accepts all the risks which include but are not limited to the risks mentioned above.     Method of contact with patient concerns: [facility]    60 minutes of total time spent on the encounter, which includes face to face time and non-face to face time preparing to see the patient (eg, review of tests), Obtaining and/or reviewing separately obtained history, Documenting clinical information in the electronic or other health record, Independently interpreting results (not separately reported) and communicating results to the patient/family/caregiver, or Care coordination (not separately reported).             Daysi Montes PA-C  Rheumatology Department   [facility]

## 2022-03-14 NOTE — PROGRESS NOTES
Administered 0.5mL of Influenza (Fluad) to left deltoid. Patient instructed on s/s to report. Tolerated well. Advised to wait 15 minutes post administration in clinic. Patient Verbalized understanding.    Lot- [id number]  Exp- June 30th, 2022

## 2022-03-15 ENCOUNTER — PATIENT MESSAGE (OUTPATIENT)
Dept: RHEUMATOLOGY | Facility: CLINIC | Age: 67
End: 2022-03-15
Payer: MEDICARE

## 2022-03-15 LAB — ERYTHROCYTE [SEDIMENTATION RATE] IN BLOOD BY WESTERGREN METHOD: 6 MM/HR (ref 0–36)

## 2022-04-25 ENCOUNTER — PATIENT MESSAGE (OUTPATIENT)
Dept: INTERNAL MEDICINE | Facility: CLINIC | Age: 67
End: 2022-04-25
Payer: MEDICARE

## 2022-04-25 DIAGNOSIS — E11.69 TYPE 2 DIABETES MELLITUS WITH OTHER SPECIFIED COMPLICATION, WITHOUT LONG-TERM CURRENT USE OF INSULIN: Chronic | ICD-10-CM

## 2022-04-25 NOTE — TELEPHONE ENCOUNTER
Care Due:                  Date            Visit Type   Department     Provider  --------------------------------------------------------------------------------                                EP -                              PRIMARY      [facility] INTERNAL  Last Visit: 02-      CARE ([facility])   MEDICINE       [address]  Next Visit: None Scheduled  None         None Found                                                            Last  Test          Frequency    Reason                     Performed    Due Date  --------------------------------------------------------------------------------    HBA1C.......  6 months...  metFORMIN, semaglutide...  12- 06-    Lipid Panel.  12 months..  atorvastatin.............  06- 06-    Powered by [employer]. Reference number: [id number].   4/25/2022 5:16:18 PM CDT

## 2022-04-25 NOTE — TELEPHONE ENCOUNTER
----- Message from Michael Rodriguez sent at 4/25/2022  5:21 PM CDT -----  Regarding: Return Call  Contact: [phone]  Patient Returning Call    Who Called: May    Who Left Message for Patient: Beau Catherine LPN    Does the patient know what this is regarding? Medication Refills    Would the patient rather a call back or a [facility]? Call Back    Best Call Back Number:[phone]

## 2022-04-25 NOTE — TELEPHONE ENCOUNTER
----- Message from Roger Melgoza sent at 4/25/2022  4:20 PM CDT -----  Contact: patient  Prescription refill     semaglutide (OZEMPIC) 0.25 mg or 0.5 mg(2 mg/1.5 mL) pen injector  The patient is completely out and needs this order today .      She wants a call back that this was taking care of

## 2022-04-25 NOTE — TELEPHONE ENCOUNTER
TO MY TEAM:   · May was supposed to have A1c mid-May and F/U appointment with me in June, but these aren't scheduled.  · I already ordered A1c and linked it to her 6/13 lab appointment.  · Schedule May for next available regular appointment with me anytime after [place] 15 and before the end of July.    Thanks!       May Carl.    I received a request to refill your Ozempic. I approved the refill request and sent the prescription electronically to your pharmacy.    Someone from [facility] should be calling you soon to help schedule your appointment with me. If you haven't been contacted within the next 3-4 business days, send my team a message, and they'll be glad to help you.     Thanks for letting me care for you, and thanks for trusting [facility] with your healthcare needs.    All the best,    YAHAIRA Lane MD    Medications Ordered This Encounter   Medications    semaglutide (OZEMPIC) 0.25 mg or 0.5 mg(2 mg/1.5 mL) pen injector     Sig: Inject 0.5 mg into the skin every 7 days. for 999 doses     Dispense:  4 pen     Refill:  3        Orders Placed This Encounter   Procedures    Hemoglobin A1C      Future Appointments   Date Time Provider Department Center   5/16/2022  9:30 AM Ellie Clark RD, CDE [facility]   [facility]13[facility]2022 11[facility] [place]   6/13/2022 11:45 AM Domingo Gonzalez MD [facility] RHEUM [place]   4/11/2023  1:00 PM [facility]  MAMMO1 SCREEN HC M[facility]MO [place] [facility]   4/11/2023  1:50 PM Gwen Krishnan MD [facility] OBGYN [place] [facility]

## 2022-05-11 ENCOUNTER — TELEPHONE (OUTPATIENT)
Dept: DIABETES | Facility: CLINIC | Age: 67
End: 2022-05-11
Payer: MEDICARE

## 2022-05-11 NOTE — TELEPHONE ENCOUNTER
Message left regarding need to RS appt on 5/16 due to provider bookout. Return contacts provided and requested pt check mychart message.

## 2022-05-12 ENCOUNTER — PATIENT MESSAGE (OUTPATIENT)
Dept: DIABETES | Facility: CLINIC | Age: 67
End: 2022-05-12
Payer: MEDICARE

## 2022-05-13 ENCOUNTER — TELEPHONE (OUTPATIENT)
Dept: DIABETES | Facility: CLINIC | Age: 67
End: 2022-05-13
Payer: MEDICARE

## 2022-06-09 ENCOUNTER — TELEPHONE (OUTPATIENT)
Dept: RHEUMATOLOGY | Facility: CLINIC | Age: 67
End: 2022-06-09
Payer: MEDICARE

## 2022-06-09 NOTE — TELEPHONE ENCOUNTER
----- Message from Casey Pratt sent at 6/9/2022 12:05 PM CDT -----  Contact: [phone]  Patient would like to consult with a nurse in regards to her scheduled appt. Please call back at [phone]. Thanks r/s

## 2022-06-09 NOTE — TELEPHONE ENCOUNTER
Spoke with pt and she is unable to make the 6.13.22 appt. Moved to next opening on 9.19.22 at 1.15 at [facility] and moved labs to 6.20.22 at [facility]

## 2022-06-20 ENCOUNTER — LAB VISIT (OUTPATIENT)
Dept: LAB | Facility: HOSPITAL | Age: 67
End: 2022-06-20
Attending: PHYSICIAN ASSISTANT
Payer: MEDICARE

## 2022-06-20 ENCOUNTER — CLINICAL SUPPORT (OUTPATIENT)
Dept: DIABETES | Facility: CLINIC | Age: 67
End: 2022-06-20
Payer: MEDICARE

## 2022-06-20 VITALS — BODY MASS INDEX: 30.19 KG/M2 | WEIGHT: 170.44 LBS

## 2022-06-20 DIAGNOSIS — E11.69 TYPE 2 DIABETES MELLITUS WITH OTHER SPECIFIED COMPLICATION, WITHOUT LONG-TERM CURRENT USE OF INSULIN: Chronic | ICD-10-CM

## 2022-06-20 DIAGNOSIS — E11.69 TYPE 2 DIABETES MELLITUS WITH OTHER SPECIFIED COMPLICATION, WITHOUT LONG-TERM CURRENT USE OF INSULIN: ICD-10-CM

## 2022-06-20 DIAGNOSIS — Z79.899 LONG TERM CURRENT USE OF IMMUNOSUPPRESSIVE DRUG: ICD-10-CM

## 2022-06-20 LAB
ALBUMIN SERPL BCP-MCNC: 3.8 G/DL (ref 3.5–5.2)
ALP SERPL-CCNC: 49 U/L (ref 55–135)
ALT SERPL W/O P-5'-P-CCNC: 30 U/L (ref 10–44)
ANION GAP SERPL CALC-SCNC: 8 MMOL/L (ref 8–16)
AST SERPL-CCNC: 19 U/L (ref 10–40)
BASOPHILS # BLD AUTO: 0.04 K/UL (ref 0–0.2)
BASOPHILS NFR BLD: 0.7 % (ref 0–1.9)
BILIRUB SERPL-MCNC: 0.7 MG/DL (ref 0.1–1)
BUN SERPL-MCNC: 14 MG/DL (ref 8–23)
CALCIUM SERPL-MCNC: 9.9 MG/DL (ref 8.7–10.5)
CHLORIDE SERPL-SCNC: 104 MMOL/L (ref 95–110)
CO2 SERPL-SCNC: 30 MMOL/L (ref 23–29)
CREAT SERPL-MCNC: 0.6 MG/DL (ref 0.5–1.4)
CRP SERPL-MCNC: 1.2 MG/L (ref 0–8.2)
DIFFERENTIAL METHOD: ABNORMAL
EOSINOPHIL # BLD AUTO: 0.2 K/UL (ref 0–0.5)
EOSINOPHIL NFR BLD: 4.4 % (ref 0–8)
ERYTHROCYTE [DISTWIDTH] IN BLOOD BY AUTOMATED COUNT: 12.9 % (ref 11.5–14.5)
EST. GFR  (AFRICAN AMERICAN): >60 ML/MIN/1.73 M^2
EST. GFR  (NON AFRICAN AMERICAN): >60 ML/MIN/1.73 M^2
ESTIMATED AVG GLUCOSE: 108 MG/DL (ref 68–131)
GLUCOSE SERPL-MCNC: 91 MG/DL (ref 70–110)
HBA1C MFR BLD: 5.4 % (ref 4–5.6)
HCT VFR BLD AUTO: 38.7 % (ref 37–48.5)
HGB BLD-MCNC: 12.9 G/DL (ref 12–16)
IMM GRANULOCYTES # BLD AUTO: 0.01 K/UL (ref 0–0.04)
IMM GRANULOCYTES NFR BLD AUTO: 0.2 % (ref 0–0.5)
LYMPHOCYTES # BLD AUTO: 1.6 K/UL (ref 1–4.8)
LYMPHOCYTES NFR BLD: 29 % (ref 18–48)
MCH RBC QN AUTO: 31.5 PG (ref 27–31)
MCHC RBC AUTO-ENTMCNC: 33.3 G/DL (ref 32–36)
MCV RBC AUTO: 94 FL (ref 82–98)
MONOCYTES # BLD AUTO: 0.5 K/UL (ref 0.3–1)
MONOCYTES NFR BLD: 8.6 % (ref 4–15)
NEUTROPHILS # BLD AUTO: 3.1 K/UL (ref 1.8–7.7)
NEUTROPHILS NFR BLD: 57.1 % (ref 38–73)
NRBC BLD-RTO: 0 /100 WBC
PLATELET # BLD AUTO: 270 K/UL (ref 150–450)
PMV BLD AUTO: 9.2 FL (ref 9.2–12.9)
POTASSIUM SERPL-SCNC: 5.2 MMOL/L (ref 3.5–5.1)
PROT SERPL-MCNC: 6.3 G/DL (ref 6–8.4)
RBC # BLD AUTO: 4.1 M/UL (ref 4–5.4)
SODIUM SERPL-SCNC: 142 MMOL/L (ref 136–145)
WBC # BLD AUTO: 5.44 K/UL (ref 3.9–12.7)

## 2022-06-20 PROCEDURE — 85652 RBC SED RATE AUTOMATED: CPT | Performed by: PHYSICIAN ASSISTANT

## 2022-06-20 PROCEDURE — 83036 HEMOGLOBIN GLYCOSYLATED A1C: CPT | Performed by: FAMILY MEDICINE

## 2022-06-20 PROCEDURE — G0108 PR DIAB MANAGE TRN  PER INDIV: ICD-10-PCS | Mod: S$GLB,,, | Performed by: DIETITIAN, REGISTERED

## 2022-06-20 PROCEDURE — 80053 COMPREHEN METABOLIC PANEL: CPT | Performed by: PHYSICIAN ASSISTANT

## 2022-06-20 PROCEDURE — 86140 C-REACTIVE PROTEIN: CPT | Performed by: PHYSICIAN ASSISTANT

## 2022-06-20 PROCEDURE — 99999 PR PBB SHADOW E&M-EST. PATIENT-LVL III: CPT | Mod: PBBFAC,,, | Performed by: DIETITIAN, REGISTERED

## 2022-06-20 PROCEDURE — 85025 COMPLETE CBC W/AUTO DIFF WBC: CPT | Performed by: PHYSICIAN ASSISTANT

## 2022-06-20 PROCEDURE — 99999 PR PBB SHADOW E&M-EST. PATIENT-LVL III: ICD-10-PCS | Mod: PBBFAC,,, | Performed by: DIETITIAN, REGISTERED

## 2022-06-20 PROCEDURE — G0108 DIAB MANAGE TRN  PER INDIV: HCPCS | Mod: S$GLB,,, | Performed by: DIETITIAN, REGISTERED

## 2022-06-20 NOTE — PROGRESS NOTES
Diabetes Care Specialist Progress Note  Author: Ellie Clark RD, CDE  Date: 6/20/2022    Program Intake  Reason for Diabetes Program Visit:: Post Program Follow-Up  Type of Intervention:: Individual  Education: Self-Management Skill Review  Current diabetes risk level:: moderate    Lab Results   Component Value Date    HGBA1C 6.0 (H) 12/13/2021     Diabetes Management Latest Ref Rng & Units 8/16/2021   HbA1c 4.0 - 5.6 % 6.7 (H)     Diabetes Management Latest Ref Rng & Units 6/21/2021   HbA1c 4.0 - 5.6 % 8.1 (H)     Clinical    Problem Review  Active comorbidities affecting diabetes self-care.:  (HTN, HLD, PVD, Obesity)    Clinical Assessment  Current Diabetes Treatment: Diet, Exercise, Injectable, Oral Medication (Metformin 500mg 2tab twice daily. Ozempic 0.25mg weekly (pt didn't increase to 0.5 mg strength).)  Have you ever experienced hypoglycemia (low blood sugar)?: no  Have you ever experienced hyperglycemia (high blood sugar)?: no    Medication Information  How many days a week do you miss your medications?: Never  Do you sometimes have difficulty refilling your medications?: No  Medication adherence impacting ability to self-manage diabetes?: No    Labs  Do you have regular lab work to monitor your medications?: Yes  Type of Regular Lab Work: A1c, Cholesterol, Microalbumin, CBC, BMP  Where do you get your labs drawn?: [facility]  Lab Compliance Barriers: No    Nutritional Status  Diet:  (Pt reports 3meals, 1-2 snacks daily. No excess carb, sat fat or sodium. Using MR shake 1-2times daily.)  Meal Plan 24 Hour Recall - Breakfast: oatmeal (equal) OR pro shake  Meal Plan 24 Hour Recall - Lunch: yogurt OR turkey / chix w/ pb crackers  Meal Plan 24 Hour Recall - Dinner: squash, tomatoes, onions and pork sausage  Meal Plan 24 Hour Recall - Snack: fruit, animal crackers (5-6); mariah: diet, water, occs oj  Change in appetite?: No  Was weight loss intentional or unintentional?: Intentional (~40 lbs past year)  Current  nutritional status an area of need that is impacting patient's ability to self-manage diabetes?: No    Diabetes Self-Management Skills Assessment    Nutrition/Healthy Eating  Challenges to healthy eating::  (none)  Method of carbohydrate measurement:: carb counting/reading labels (MR shake/entree, air fryer)  Patient can identify foods that impact blood sugar.: yes  Nutrition/Healthy Eating Skills Assessment Completed:: Yes  Assessment indicates:: Adequate understanding  Area of need?: No    Physical Activity/Exercise  Patient's daily activity level::  (Walking in neighborhood ~5d/wk for ~15min)  Patient can identify reasons why exercise/physical activity is important in diabetes management.: yes  Physical Activity/Exercise Skills Assessment Completed: : Yes  Assessment indicates:: Instruction Needed  Area of need?: Yes    Medications  Patient is able to describe current diabetes management routine.: yes  Patient is able to identify current diabetes medications, dosages, and appropriate timing of medications.: no  Patient understands the purpose of the medications taken for diabetes.: yes  Patient reports problems or concerns with current medication regimen.: yes  Medication regimen problems/concerns:: unsure of doses  Medication Skills Assessment Completed:: Yes  Assessment indicates:: Instruction Needed  Area of need?: Yes    Home Blood Glucose Monitoring  Patient states that blood sugar is checked at home daily.: yes  Monitoring Method:: home glucometer  When you check what is your typical blood sugar range? : per recall, fst BG 140s; not testing other times  Blood glucose logs:: no, encouraged to bring logs to provider visits  Home Blood Glucose Monitoring Skills Assessment Completed: : Yes  Assessment indicates:: Adequate understanding  Area of need?: No    Assessment Summary and Plan  Based on today's diabetes care assessment, the following areas of need were identified:      Social 6/14/2021   Support No    Access to Mass Media/Tech No   Cognitive/Behavioral Health No   Culture/Roman Catholic No   Communication No   Health Literacy No        Clinical 6/20/2022   Medication Adherence No   Lab Compliance No   Nutritional Status No        Diabetes Self-Management Skills 6/20/2022   Nutrition/Healthy Eating No   Physical Activity/Exercise Yes  Discussed strategies to improve aerobic and  resistance exercises using online chair based videos in addition to her walking program.   Medication Yes - see care plan   Home Blood Glucose Monitoring No     Today's interventions were provided through individual discussion, instruction, and written materials were provided.    Patient verbalized understanding of instruction and written materials.  Pt was able to return back demonstration of instructions today. Patient understood key points, needs reinforcement and further instruction.     Diabetes Self-Management Care Plan:  Today's Diabetes Self-Management Care Plan was developed with May's input. May has agreed to work toward the following goal(s) to improve his/her overall diabetes control.      Care Plan: Diabetes Management   Updates made since 5/21/2022 12:00 AM      Problem: Healthy Eating       Goal: Eat 3 meals daily - manage carbs 30grams/meal, 5-15grams/snack. Completed 6/20/2022   Start Date: 6/14/2021   Expected End Date: 6/20/2022   This Visit's Progress: Met   Recent Progress: Met   Priority: Low   Barriers: No Barriers Identified   Note:    Encouraged overall progress and adequate protein intake.      Problem: Blood Glucose Self-Monitoring       Goal: Patient agrees to check and record blood sugars 1 time per day (rotate btw fst, 2hr pp). Completed 6/20/2022   Start Date: 6/14/2021   Expected End Date: 6/20/2022   This Visit's Progress: Met   Recent Progress: Met   Priority: Medium   Barriers: No Barriers Identified   Note:    Encouraged progress. Discussed rotating test times btw fst, 2hr ppd & bring meter/records to  clinic.     Problem: Medications       Goal: Patient Agrees to take Diabetes Medication(s) as prescribed. Completed 6/20/2022   Start Date: 8/16/2021   Expected End Date: 6/20/2022   This Visit's Progress: Met   Recent Progress: Met   Priority: High   Barriers: No Barriers Identified   Note:    Discussed instructions per Dr Lane to increase Ozempic 0.5mg weekly dose.          Follow Up Plan   Follow up as referred.    Today's care plan and follow up schedule was discussed with patient.  May verbalized understanding of the care plan, goals, and agrees to follow up plan.        The patient was encouraged to communicate with his/her health care provider/physician and care team regarding his/her condition(s) and treatment.  I provided the patient with my contact information today and encouraged to contact me via phone or [facility] Portal as needed.     Length of Visit   Total Time: 30 Minutes

## 2022-06-20 NOTE — LETTER
June 20, 2022    YAHAIRA Lane MD  [address]  [place] [address]         Patient: May Montoya   MR Number: [id number]   YOB: 1955   Date of Visit: 6/20/2022       Dear Dr. Lane:    Thank you for referring May for diabetes self-management education and support services. She has completed all components of our Diabetes Management Program. Below is a summary of her clinical outcomes and goal progress.    Patient Outcomes:    A1c Status:   Lab Results   Component Value Date    HGBA1C 6.0 (H) 12/13/2021    HGBA1C 6.7 (H) 08/16/2021       Care Plan: Diabetes Management   Updates made since 5/21/2022 12:00 AM      Problem: Healthy Eating       Goal: Eat 3 meals daily - manage carbs 30grams/meal, 5-15grams/snack. Completed 6/20/2022   Start Date: 6/14/2021   Expected End Date: 6/20/2022   This Visit's Progress: Met   Recent Progress: Met   Priority: Low   Barriers: No Barriers Identified   Note:    Encouraged overall progress and adequate protein intake.      Problem: Blood Glucose Self-Monitoring       Goal: Patient agrees to check and record blood sugars 1 time per day (rotate btw fst, 2hr pp). Completed 6/20/2022   Start Date: 6/14/2021   Expected End Date: 6/20/2022   This Visit's Progress: Met   Recent Progress: Met   Priority: Medium   Barriers: No Barriers Identified   Note:    Encouraged progress. Discussed rotating test times btw fst, 2hr ppd & bring meter/records to clinic.     Problem: Medications       Goal: Patient Agrees to take Diabetes Medication(s) as prescribed. Completed 6/20/2022   Start Date: 8/16/2021   Expected End Date: 6/20/2022   This Visit's Progress: Met   Recent Progress: Met   Priority: High   Barriers: No Barriers Identified   Note:    Discussed instructions per Dr Lane to increase Ozempic 0.5mg weekly dose.          Follow up:   · May to attend medical appointments as scheduled  · May to update you on her DM education progress as needed      If  you have questions, please do not hesitate to call me. I look forward to providing additional education and support as referred.    Sincerely,    Ellie Clark RD, CDE  Diabetes Care and

## 2022-06-21 LAB — ERYTHROCYTE [SEDIMENTATION RATE] IN BLOOD BY WESTERGREN METHOD: <2 MM/HR (ref 0–36)

## 2022-07-20 DIAGNOSIS — E11.9 TYPE 2 DIABETES MELLITUS WITHOUT COMPLICATION: ICD-10-CM

## 2022-08-22 ENCOUNTER — OFFICE VISIT (OUTPATIENT)
Dept: OPHTHALMOLOGY | Facility: CLINIC | Age: 67
End: 2022-08-22
Payer: MEDICARE

## 2022-08-22 DIAGNOSIS — H25.13 NUCLEAR SCLEROSIS, BILATERAL: ICD-10-CM

## 2022-08-22 DIAGNOSIS — E11.9 DIABETES MELLITUS TYPE 2 WITHOUT RETINOPATHY: Primary | ICD-10-CM

## 2022-08-22 DIAGNOSIS — H52.03 HYPEROPIA WITH PRESBYOPIA, BILATERAL: ICD-10-CM

## 2022-08-22 DIAGNOSIS — H25.013 CORTICAL AGE-RELATED CATARACT OF BOTH EYES: ICD-10-CM

## 2022-08-22 DIAGNOSIS — H52.4 HYPEROPIA WITH PRESBYOPIA, BILATERAL: ICD-10-CM

## 2022-08-22 DIAGNOSIS — E11.36 DIABETIC CATARACT OF BOTH EYES: ICD-10-CM

## 2022-08-22 PROCEDURE — 2023F PR DILATED RETINAL EXAM W/O EVID OF RETINOPATHY: ICD-10-PCS | Mod: CPTII,S$GLB,, | Performed by: OPTOMETRIST

## 2022-08-22 PROCEDURE — 3061F NEG MICROALBUMINURIA REV: CPT | Mod: CPTII,S$GLB,, | Performed by: OPTOMETRIST

## 2022-08-22 PROCEDURE — 3044F HG A1C LEVEL LT 7.0%: CPT | Mod: CPTII,S$GLB,, | Performed by: OPTOMETRIST

## 2022-08-22 PROCEDURE — 99999 PR PBB SHADOW E&M-EST. PATIENT-LVL III: ICD-10-PCS | Mod: PBBFAC,,, | Performed by: OPTOMETRIST

## 2022-08-22 PROCEDURE — 1159F MED LIST DOCD IN RCRD: CPT | Mod: CPTII,S$GLB,, | Performed by: OPTOMETRIST

## 2022-08-22 PROCEDURE — 1160F PR REVIEW ALL MEDS BY PRESCRIBER/CLIN PHARMACIST DOCUMENTED: ICD-10-PCS | Mod: CPTII,S$GLB,, | Performed by: OPTOMETRIST

## 2022-08-22 PROCEDURE — 3061F PR NEG MICROALBUMINURIA RESULT DOCUMENTED/REVIEW: ICD-10-PCS | Mod: CPTII,S$GLB,, | Performed by: OPTOMETRIST

## 2022-08-22 PROCEDURE — 4010F ACE/ARB THERAPY RXD/TAKEN: CPT | Mod: CPTII,S$GLB,, | Performed by: OPTOMETRIST

## 2022-08-22 PROCEDURE — 1126F PR PAIN SEVERITY QUANTIFIED, NO PAIN PRESENT: ICD-10-PCS | Mod: CPTII,S$GLB,, | Performed by: OPTOMETRIST

## 2022-08-22 PROCEDURE — 4010F PR ACE/ARB THEARPY RXD/TAKEN: ICD-10-PCS | Mod: CPTII,S$GLB,, | Performed by: OPTOMETRIST

## 2022-08-22 PROCEDURE — 1159F PR MEDICATION LIST DOCUMENTED IN MEDICAL RECORD: ICD-10-PCS | Mod: CPTII,S$GLB,, | Performed by: OPTOMETRIST

## 2022-08-22 PROCEDURE — 3044F PR MOST RECENT HEMOGLOBIN A1C LEVEL <7.0%: ICD-10-PCS | Mod: CPTII,S$GLB,, | Performed by: OPTOMETRIST

## 2022-08-22 PROCEDURE — 92015 PR REFRACTION: ICD-10-PCS | Mod: S$GLB,,, | Performed by: OPTOMETRIST

## 2022-08-22 PROCEDURE — 92014 PR EYE EXAM, EST PATIENT,COMPREHESV: ICD-10-PCS | Mod: S$GLB,,, | Performed by: OPTOMETRIST

## 2022-08-22 PROCEDURE — 92014 COMPRE OPH EXAM EST PT 1/>: CPT | Mod: S$GLB,,, | Performed by: OPTOMETRIST

## 2022-08-22 PROCEDURE — 1160F RVW MEDS BY RX/DR IN RCRD: CPT | Mod: CPTII,S$GLB,, | Performed by: OPTOMETRIST

## 2022-08-22 PROCEDURE — 1126F AMNT PAIN NOTED NONE PRSNT: CPT | Mod: CPTII,S$GLB,, | Performed by: OPTOMETRIST

## 2022-08-22 PROCEDURE — 3066F PR DOCUMENTATION OF TREATMENT FOR NEPHROPATHY: ICD-10-PCS | Mod: CPTII,S$GLB,, | Performed by: OPTOMETRIST

## 2022-08-22 PROCEDURE — 2023F DILAT RTA XM W/O RTNOPTHY: CPT | Mod: CPTII,S$GLB,, | Performed by: OPTOMETRIST

## 2022-08-22 PROCEDURE — 99999 PR PBB SHADOW E&M-EST. PATIENT-LVL III: CPT | Mod: PBBFAC,,, | Performed by: OPTOMETRIST

## 2022-08-22 PROCEDURE — 3066F NEPHROPATHY DOC TX: CPT | Mod: CPTII,S$GLB,, | Performed by: OPTOMETRIST

## 2022-08-22 PROCEDURE — 92015 DETERMINE REFRACTIVE STATE: CPT | Mod: S$GLB,,, | Performed by: OPTOMETRIST

## 2022-08-22 NOTE — PROGRESS NOTES
HPI     Diabetic Eye Exam     Comments: Yearly diabetic eye exam              Comments     Dm since 2011  No sx's          Last edited by Hollie Frey on 8/22/2022  9:11 AM. (History)            Assessment /Plan     For exam results, see Encounter Report.    Diabetes mellitus type 2 without retinopathy  There was no diabetic retinopathy present in either eye today.   Recommended that pt continue care with PCP and/or specialists regarding diabetes.  Follow-up dilated eye exam recommended in 12 months, sooner with any vision changes or new concerns.    Nuclear sclerosis, bilateral  Cortical age-related cataract of both eyes  Diabetic cataract of both eyes  Visually significant cataract.  Patient is at the option stage.   Cataract surgery will improve vision, but necessity is dependent on patient's symptoms.   Pt declines surgical consult at this time.  Will continue to monitor over the next 12 months. Pt to call or RTC with any significant change in vision prior to next visit.    Hyperopia with presbyopia, bilateral  Eyeglass Final Rx     Eyeglass Final Rx       Sphere Add    Right +3.00 +2.50    Left +3.00 +2.50    Expiration Date: 8/22/2023              RTC 1 yr for dilated eye exam or PRN if any problems.   Discussed above and answered questions.

## 2022-08-23 ENCOUNTER — TELEPHONE (OUTPATIENT)
Dept: INTERNAL MEDICINE | Facility: CLINIC | Age: 67
End: 2022-08-23
Payer: MEDICARE

## 2022-08-23 ENCOUNTER — TELEPHONE (OUTPATIENT)
Dept: RHEUMATOLOGY | Facility: CLINIC | Age: 67
End: 2022-08-23
Payer: MEDICARE

## 2022-08-23 NOTE — TELEPHONE ENCOUNTER
----- Message from Deidre Quintana sent at 8/23/2022  9:31 AM CDT -----  Contact: [facility]  Request a return call on this pt A1C results, no additional info given and can be reached at [phone]///[id number]

## 2022-08-23 NOTE — TELEPHONE ENCOUNTER
----- Message from Kiran Manjarrez LPN sent at 8/23/2022 10:24 AM CDT -----  Contact: [facility]  Request a return call on this pt A1C results, no additional info given and can be reached at [phone]///thxMW

## 2022-08-26 DIAGNOSIS — E11.69 TYPE 2 DIABETES MELLITUS WITH OTHER SPECIFIED COMPLICATION, WITHOUT LONG-TERM CURRENT USE OF INSULIN: Chronic | ICD-10-CM

## 2022-08-26 NOTE — TELEPHONE ENCOUNTER
Requested Prescriptions     Pending Prescriptions Disp Refills    semaglutide (OZEMPIC) 0.25 mg or 0.5 mg(2 mg/1.5 mL) pen injector 1 pen 0     Sig: Inject 0.5 mg into the skin every 7 days. for 999 doses     #LMRX  TO MY TEAM:    Please notify May of refill response above.   Explain: Appointment required for any further refills.   Schedule May for next available virtual visit or in-office appointment with or TRACI Diaz, assuming they haven't already scheduled one.   Please close any care gaps you can by written order guidelines.    Thanks!    Future Appointments   Date Time Provider Department Center   9/19/2022  1:15 PM [facility] [place]   4/11/2023  1:00 PM [facility]  MAMMO1 SCREEN [facility] MAMMO [place] [facility]   4/11/2023  1:50 PM Gwen Krishnan MD [facility] OBGYN [place] [facility]       HEALTH MAINTENANCE INTERVENTIONS - DUE OR DUE SOON  Health Maintenance Due   Topic Date Due    COVID-19 Vaccine (3 - Booster for Pfizer series) 08/05/2021    Pneumococcal Vaccines (Age 65+) (3 - PPSV23 or PCV20) 11/16/2021    Lipid Panel  06/21/2022

## 2022-08-26 NOTE — TELEPHONE ENCOUNTER
Called and left a voicemail for patient that Dr. Lane sent in her prescription that was requested and to call us back and schedule an appointment.

## 2022-08-26 NOTE — TELEPHONE ENCOUNTER
Care Due:                  Date            Visit Type   Department     Provider  --------------------------------------------------------------------------------                                EP -                              PRIMARY      [facility] INTERNAL  Last Visit: 02-      CARE ([facility])   MEDICINE       [address]  Next Visit: None Scheduled  None         None Found                                                            Last  Test          Frequency    Reason                     Performed    Due Date  --------------------------------------------------------------------------------    Lipid Panel.  12 months..  atorvastatin.............  06- 06-    Health [facility] Embedded Care Gaps. Reference number: [id number]. 8/26/2022   9:32:39 AM CDT

## 2022-08-26 NOTE — TELEPHONE ENCOUNTER
----- Message from Lorena Drew sent at 8/26/2022  8:03 AM CDT -----  Contact: May  Type:  RX Refill Request    Who Called: May  Refill or New Rx:refill  RX Name and Strength:semaglutide (OZEMPIC)   How is the patient currently taking it? (ex. 1XDay):once a week  Is this a 30 day or 90 day RX:30 day  Preferred Pharmacy with phone number:  [facility] - [facility] [address]  [address]  [facility] [address]  Phone: [phone] Fax: [phone]   Local or Mail Order:Local  Ordering Provider:Domingo  Would the patient rather a call back or a response via [facility]? Call back   Best Call Back Number:[phone]  Additional Information: Patient states she completed out and have to take it on Monday.

## 2022-08-30 ENCOUNTER — TELEPHONE (OUTPATIENT)
Dept: INTERNAL MEDICINE | Facility: CLINIC | Age: 67
End: 2022-08-30
Payer: MEDICARE

## 2022-08-30 NOTE — TELEPHONE ENCOUNTER
----- Message from Germania Arana sent at 8/30/2022 12:05 PM CDT -----  Contact: [phone]  Type:  Patient Returning Call    Who Called:danny   Who Left Message for Patient:linda  Does the patient know what this is regarding?:n/a   Would the patient rather a call back or a response via [facility]? Call back   Best Call Back Number:[phone]  Additional Information:       Thanks  KB

## 2022-08-30 NOTE — TELEPHONE ENCOUNTER
Spoke with patient and scheduled her an appointment with Daysi Norton NP for medication refills on 9/12/22. She verbalized understanding.

## 2022-08-30 NOTE — TELEPHONE ENCOUNTER
----- Message from Germania Arana sent at 8/30/2022 10:00 AM CDT -----  Contact: [phone]  Type:  Sooner Apoointment Request    Caller is requesting a sooner appointment.  Caller declined first available appointment listed below.  Caller will not accept being placed on the waitlist and is requesting a message be sent to doctor.  Name of Caller:danny   When is the first available appointment?n/a   Symptoms: check up   Would the patient rather a call back or a response [facility]? Call back   Best Call Back Number:[phone]  Additional Information: n/a      Thanks   KB

## 2022-09-12 ENCOUNTER — OFFICE VISIT (OUTPATIENT)
Dept: INTERNAL MEDICINE | Facility: CLINIC | Age: 67
End: 2022-09-12
Payer: MEDICARE

## 2022-09-12 VITALS
HEIGHT: 63 IN | WEIGHT: 170.63 LBS | SYSTOLIC BLOOD PRESSURE: 126 MMHG | BODY MASS INDEX: 30.23 KG/M2 | TEMPERATURE: 98 F | DIASTOLIC BLOOD PRESSURE: 74 MMHG

## 2022-09-12 DIAGNOSIS — E78.5 HYPERLIPIDEMIA ASSOCIATED WITH TYPE 2 DIABETES MELLITUS: Chronic | ICD-10-CM

## 2022-09-12 DIAGNOSIS — I15.2 HYPERTENSION ASSOCIATED WITH DIABETES: Primary | Chronic | ICD-10-CM

## 2022-09-12 DIAGNOSIS — Z23 NEED FOR INFLUENZA VACCINATION: ICD-10-CM

## 2022-09-12 DIAGNOSIS — R63.4 WEIGHT LOSS: ICD-10-CM

## 2022-09-12 DIAGNOSIS — E11.69 HYPERLIPIDEMIA ASSOCIATED WITH TYPE 2 DIABETES MELLITUS: Chronic | ICD-10-CM

## 2022-09-12 DIAGNOSIS — M15.9 GENERALIZED OSTEOARTHRITIS OF MULTIPLE SITES: Chronic | ICD-10-CM

## 2022-09-12 DIAGNOSIS — E11.69 TYPE 2 DIABETES MELLITUS WITH OTHER SPECIFIED COMPLICATION, WITHOUT LONG-TERM CURRENT USE OF INSULIN: Chronic | ICD-10-CM

## 2022-09-12 DIAGNOSIS — M19.041 PRIMARY OSTEOARTHRITIS OF BOTH HANDS: ICD-10-CM

## 2022-09-12 DIAGNOSIS — I87.303 CHRONIC VENOUS HYPERTENSION INVOLVING BOTH SIDES: Chronic | ICD-10-CM

## 2022-09-12 DIAGNOSIS — M06.00 SERONEGATIVE RHEUMATOID ARTHRITIS: ICD-10-CM

## 2022-09-12 DIAGNOSIS — I73.9 PERIPHERAL VASCULAR DISEASE, UNSPECIFIED: ICD-10-CM

## 2022-09-12 DIAGNOSIS — E11.42 TYPE 2 DIABETES MELLITUS WITH DIABETIC POLYNEUROPATHY, WITHOUT LONG-TERM CURRENT USE OF INSULIN: Chronic | ICD-10-CM

## 2022-09-12 DIAGNOSIS — Z23 NEED FOR PNEUMOCOCCAL VACCINATION: ICD-10-CM

## 2022-09-12 DIAGNOSIS — E11.59 HYPERTENSION ASSOCIATED WITH DIABETES: Chronic | ICD-10-CM

## 2022-09-12 DIAGNOSIS — E11.59 HYPERTENSION ASSOCIATED WITH DIABETES: Primary | Chronic | ICD-10-CM

## 2022-09-12 DIAGNOSIS — I15.2 HYPERTENSION ASSOCIATED WITH DIABETES: Chronic | ICD-10-CM

## 2022-09-12 DIAGNOSIS — M19.042 PRIMARY OSTEOARTHRITIS OF BOTH HANDS: ICD-10-CM

## 2022-09-12 PROBLEM — E11.9 TYPE 2 DIABETES MELLITUS, WITHOUT LONG-TERM CURRENT USE OF INSULIN: Status: ACTIVE | Noted: 2021-03-29

## 2022-09-12 PROCEDURE — 3078F DIAST BP <80 MM HG: CPT | Mod: CPTII,S$GLB,, | Performed by: NURSE PRACTITIONER

## 2022-09-12 PROCEDURE — 1126F AMNT PAIN NOTED NONE PRSNT: CPT | Mod: CPTII,S$GLB,, | Performed by: NURSE PRACTITIONER

## 2022-09-12 PROCEDURE — 3008F PR BODY MASS INDEX (BMI) DOCUMENTED: ICD-10-PCS | Mod: CPTII,S$GLB,, | Performed by: NURSE PRACTITIONER

## 2022-09-12 PROCEDURE — 1159F PR MEDICATION LIST DOCUMENTED IN MEDICAL RECORD: ICD-10-PCS | Mod: CPTII,S$GLB,, | Performed by: NURSE PRACTITIONER

## 2022-09-12 PROCEDURE — 90677 PNEUMOCOCCAL CONJUGATE VACCINE 20-VALENT: ICD-10-PCS | Mod: S$GLB,,, | Performed by: NURSE PRACTITIONER

## 2022-09-12 PROCEDURE — G0008 ADMIN INFLUENZA VIRUS VAC: HCPCS | Mod: S$GLB,,, | Performed by: NURSE PRACTITIONER

## 2022-09-12 PROCEDURE — 3066F NEPHROPATHY DOC TX: CPT | Mod: CPTII,S$GLB,, | Performed by: NURSE PRACTITIONER

## 2022-09-12 PROCEDURE — 3072F LOW RISK FOR RETINOPATHY: CPT | Mod: CPTII,S$GLB,, | Performed by: NURSE PRACTITIONER

## 2022-09-12 PROCEDURE — 1101F PT FALLS ASSESS-DOCD LE1/YR: CPT | Mod: CPTII,S$GLB,, | Performed by: NURSE PRACTITIONER

## 2022-09-12 PROCEDURE — 1159F MED LIST DOCD IN RCRD: CPT | Mod: CPTII,S$GLB,, | Performed by: NURSE PRACTITIONER

## 2022-09-12 PROCEDURE — 3008F BODY MASS INDEX DOCD: CPT | Mod: CPTII,S$GLB,, | Performed by: NURSE PRACTITIONER

## 2022-09-12 PROCEDURE — 3288F PR FALLS RISK ASSESSMENT DOCUMENTED: ICD-10-PCS | Mod: CPTII,S$GLB,, | Performed by: NURSE PRACTITIONER

## 2022-09-12 PROCEDURE — 99214 PR OFFICE/OUTPT VISIT, EST, LEVL IV, 30-39 MIN: ICD-10-PCS | Mod: 25,S$GLB,, | Performed by: NURSE PRACTITIONER

## 2022-09-12 PROCEDURE — G0009 PNEUMOCOCCAL CONJUGATE VACCINE 20-VALENT: ICD-10-PCS | Mod: S$GLB,,, | Performed by: NURSE PRACTITIONER

## 2022-09-12 PROCEDURE — 90677 PCV20 VACCINE IM: CPT | Mod: S$GLB,,, | Performed by: NURSE PRACTITIONER

## 2022-09-12 PROCEDURE — 1160F RVW MEDS BY RX/DR IN RCRD: CPT | Mod: CPTII,S$GLB,, | Performed by: NURSE PRACTITIONER

## 2022-09-12 PROCEDURE — 3061F PR NEG MICROALBUMINURIA RESULT DOCUMENTED/REVIEW: ICD-10-PCS | Mod: CPTII,S$GLB,, | Performed by: NURSE PRACTITIONER

## 2022-09-12 PROCEDURE — 1160F PR REVIEW ALL MEDS BY PRESCRIBER/CLIN PHARMACIST DOCUMENTED: ICD-10-PCS | Mod: CPTII,S$GLB,, | Performed by: NURSE PRACTITIONER

## 2022-09-12 PROCEDURE — 3066F PR DOCUMENTATION OF TREATMENT FOR NEPHROPATHY: ICD-10-PCS | Mod: CPTII,S$GLB,, | Performed by: NURSE PRACTITIONER

## 2022-09-12 PROCEDURE — 3074F SYST BP LT 130 MM HG: CPT | Mod: CPTII,S$GLB,, | Performed by: NURSE PRACTITIONER

## 2022-09-12 PROCEDURE — G0009 ADMIN PNEUMOCOCCAL VACCINE: HCPCS | Mod: S$GLB,,, | Performed by: NURSE PRACTITIONER

## 2022-09-12 PROCEDURE — 3288F FALL RISK ASSESSMENT DOCD: CPT | Mod: CPTII,S$GLB,, | Performed by: NURSE PRACTITIONER

## 2022-09-12 PROCEDURE — 4010F PR ACE/ARB THEARPY RXD/TAKEN: ICD-10-PCS | Mod: CPTII,S$GLB,, | Performed by: NURSE PRACTITIONER

## 2022-09-12 PROCEDURE — 4010F ACE/ARB THERAPY RXD/TAKEN: CPT | Mod: CPTII,S$GLB,, | Performed by: NURSE PRACTITIONER

## 2022-09-12 PROCEDURE — 3061F NEG MICROALBUMINURIA REV: CPT | Mod: CPTII,S$GLB,, | Performed by: NURSE PRACTITIONER

## 2022-09-12 PROCEDURE — 3072F PR LOW RISK FOR RETINOPATHY: ICD-10-PCS | Mod: CPTII,S$GLB,, | Performed by: NURSE PRACTITIONER

## 2022-09-12 PROCEDURE — 99999 PR PBB SHADOW E&M-EST. PATIENT-LVL IV: CPT | Mod: PBBFAC,,, | Performed by: NURSE PRACTITIONER

## 2022-09-12 PROCEDURE — 3044F PR MOST RECENT HEMOGLOBIN A1C LEVEL <7.0%: ICD-10-PCS | Mod: CPTII,S$GLB,, | Performed by: NURSE PRACTITIONER

## 2022-09-12 PROCEDURE — 3078F PR MOST RECENT DIASTOLIC BLOOD PRESSURE < 80 MM HG: ICD-10-PCS | Mod: CPTII,S$GLB,, | Performed by: NURSE PRACTITIONER

## 2022-09-12 PROCEDURE — 1101F PR PT FALLS ASSESS DOC 0-1 FALLS W/OUT INJ PAST YR: ICD-10-PCS | Mod: CPTII,S$GLB,, | Performed by: NURSE PRACTITIONER

## 2022-09-12 PROCEDURE — 3074F PR MOST RECENT SYSTOLIC BLOOD PRESSURE < 130 MM HG: ICD-10-PCS | Mod: CPTII,S$GLB,, | Performed by: NURSE PRACTITIONER

## 2022-09-12 PROCEDURE — 90694 FLU VACCINE - QUADRIVALENT - ADJUVANTED: ICD-10-PCS | Mod: S$GLB,,, | Performed by: NURSE PRACTITIONER

## 2022-09-12 PROCEDURE — 99999 PR PBB SHADOW E&M-EST. PATIENT-LVL IV: ICD-10-PCS | Mod: PBBFAC,,, | Performed by: NURSE PRACTITIONER

## 2022-09-12 PROCEDURE — G0008 FLU VACCINE - QUADRIVALENT - ADJUVANTED: ICD-10-PCS | Mod: S$GLB,,, | Performed by: NURSE PRACTITIONER

## 2022-09-12 PROCEDURE — 3044F HG A1C LEVEL LT 7.0%: CPT | Mod: CPTII,S$GLB,, | Performed by: NURSE PRACTITIONER

## 2022-09-12 PROCEDURE — 99214 OFFICE O/P EST MOD 30 MIN: CPT | Mod: 25,S$GLB,, | Performed by: NURSE PRACTITIONER

## 2022-09-12 PROCEDURE — 1126F PR PAIN SEVERITY QUANTIFIED, NO PAIN PRESENT: ICD-10-PCS | Mod: CPTII,S$GLB,, | Performed by: NURSE PRACTITIONER

## 2022-09-12 PROCEDURE — 90694 VACC AIIV4 NO PRSRV 0.5ML IM: CPT | Mod: S$GLB,,, | Performed by: NURSE PRACTITIONER

## 2022-09-12 RX ORDER — METFORMIN HYDROCHLORIDE 500 MG/1
1000 TABLET ORAL 2 TIMES DAILY WITH MEALS
Qty: 360 TABLET | Refills: 2 | Status: SHIPPED | OUTPATIENT
Start: 2022-09-12 | End: 2023-04-25 | Stop reason: SDUPTHER

## 2022-09-12 RX ORDER — AMLODIPINE BESYLATE AND BENAZEPRIL HYDROCHLORIDE 2.5; 1 MG/1; MG/1
1 CAPSULE ORAL DAILY
Qty: 90 CAPSULE | Refills: 2 | Status: SHIPPED | OUTPATIENT
Start: 2022-09-12 | End: 2023-04-25 | Stop reason: SDUPTHER

## 2022-09-12 NOTE — ASSESSMENT & PLAN NOTE
Lab Results   Component Value Date    HGBA1C 5.4 06/20/2022     -Significantly improved. Continue current regimen.

## 2022-09-12 NOTE — PROGRESS NOTES
OFFICE VISIT 9/12/22  1:00 PM CDT    CHIEF COMPLAINT: Medication Refill    HPI:  Presents today for 6 month follow up with medication refills. Patient is feeling well with no complaints today. She has lost 50 pounds since initiation of Ozempic and endorses feeling significantly better overall. She is due for FLP and will schedule this today.    DIAGNOSES SPECIFICALLY EVALUATED AND TREATED THIS ENCOUNTER  1. Hypertension associated with diabetes  - amlodipine-benazepril 2.5-10 mg (LOTREL) 2.5-10 mg per capsule; Take 1 capsule by mouth once daily.  Dispense: 90 capsule; Refill: 2    2. Hyperlipidemia associated with type 2 diabetes mellitus    3. Peripheral vascular disease, unspecified    4. Type 2 diabetes mellitus with diabetic polyneuropathy, without long-term current use of insulin  - metFORMIN (GLUCOPHAGE) 500 MG tablet; Take 2 tablets (1,000 mg total) by mouth 2 (two) times daily with meals.  Dispense: 360 tablet; Refill: 2    5. Primary osteoarthritis of both hands    6. Generalized osteoarthritis of multiple sites    7. Hypertension associated with diabetes  - amlodipine-benazepril 2.5-10 mg (LOTREL) 2.5-10 mg per capsule; Take 1 capsule by mouth once daily.  Dispense: 90 capsule; Refill: 2    8. Seronegative rheumatoid arthritis    9. Chronic venous hypertension involving legs, L > R    10. Type 2 diabetes mellitus with other specified complication, without long-term current use of insulin  - semaglutide (OZEMPIC) 0.25 mg or 0.5 mg(2 mg/1.5 mL) pen injector; Inject 0.5 mg into the skin every 7 days. for 999 doses  Dispense: 3 pen; Refill: 2  - metFORMIN (GLUCOPHAGE) 500 MG tablet; Take 2 tablets (1,000 mg total) by mouth 2 (two) times daily with meals.  Dispense: 360 tablet; Refill: 2    11. Weight loss    12. Need for pneumococcal vaccination    13. Need for influenza vaccination        Problem List Items Addressed This Visit          Cardiac/Vascular    Hypertension associated with diabetes - Primary  (Chronic)    Current Assessment & Plan     BP Readings from Last 3 Encounters:   04/05/22 130/70   03/14/22 97/63   02/02/22 120/60   This condition is stable. We will continue current treatment regimen.            Relevant Medications    semaglutide (OZEMPIC) 0.25 mg or 0.5 mg(2 mg/1.5 mL) pen injector    amlodipine-benazepril 2.5-10 mg (LOTREL) 2.5-10 mg per capsule    metFORMIN (GLUCOPHAGE) 500 MG tablet    Hyperlipidemia associated with type 2 diabetes mellitus (Chronic)    Current Assessment & Plan     Lab Results   Component Value Date    CHOL 159 06/21/2021    CHOL 200 (H) 11/02/2020    CHOL 213 (H) 10/28/2019     Lab Results   Component Value Date    HDL 43 06/21/2021    HDL 51 11/02/2020    HDL 49 10/28/2019     Lab Results   Component Value Date    LDLCALC 90.8 06/21/2021    LDLCALC 120.4 11/02/2020    LDLCALC 136.6 10/28/2019     Lab Results   Component Value Date    TRIG 126 06/21/2021    TRIG 143 11/02/2020    TRIG 137 10/28/2019     Lab Results   Component Value Date    CHOLHDL 27.0 06/21/2021    CHOLHDL 25.5 11/02/2020    CHOLHDL 23.0 10/28/2019   Patient is overdue for FLP. Order placed, encouraged patient to schedule ASAP.          Relevant Medications    semaglutide (OZEMPIC) 0.25 mg or 0.5 mg(2 mg/1.5 mL) pen injector    metFORMIN (GLUCOPHAGE) 500 MG tablet    Chronic venous hypertension involving legs, L > R (Chronic)    Current Assessment & Plan     Stable, no evidence of skin breakdown          Peripheral vascular disease, unspecified    Current Assessment & Plan     This condition is stable. We will continue current treatment regimen.             Immunology/Multi System    Seronegative rheumatoid arthritis    Overview     Rheumatologist: Dr. Domingo Gonzalez             Endocrine    Type 2 diabetes mellitus, without long-term current use of insulin    Current Assessment & Plan     Lab Results   Component Value Date    HGBA1C 5.4 06/20/2022   -Significantly improved. Continue current  regimen.          Relevant Medications    semaglutide (OZEMPIC) 0.25 mg or 0.5 mg(2 mg/1.5 mL) pen injector    metFORMIN (GLUCOPHAGE) 500 MG tablet    Weight loss    Current Assessment & Plan     Wt Readings from Last 3 Encounters:   09/12/22 1315 77.4 kg (170 lb 10.2 oz)   06/20/22 1143 77.3 kg (170 lb 6.7 oz)   04/05/22 1448 77.1 kg (170 lb)   -Weight loss with Ozempic               Orthopedic    Generalized osteoarthritis of multiple sites (Chronic)    Primary osteoarthritis of both hands     Other Visit Diagnoses       Need for pneumococcal vaccination        Need for influenza vaccination        Relevant Orders    Influenza (FLUAD) - Quadrivalent (Adjuvanted) *Preferred* (65+) (PF)          Unless noted herein, any chronic conditions are represented as and appear compensated/controlled and stable, and no other significant complaints or concerns were reported.    Review of Systems   Constitutional:  Negative for chills and fever.   HENT:  Negative for congestion, ear pain, hearing loss and sinus pain.    Eyes:  Negative for blurred vision, discharge and redness.   Respiratory:  Negative for cough, hemoptysis, sputum production, shortness of breath and wheezing.    Cardiovascular:  Negative for chest pain, palpitations, claudication and leg swelling.   Gastrointestinal:  Negative for abdominal pain, constipation, diarrhea, heartburn, nausea and vomiting.   Genitourinary:  Negative for dysuria, frequency and urgency.   Musculoskeletal:  Negative for back pain, falls, joint pain, myalgias and neck pain.   Skin:  Negative for itching and rash.   Neurological:  Negative for dizziness, tingling, sensory change, focal weakness, weakness and headaches.   Psychiatric/Behavioral:  Negative for depression.        PHYSICAL EXAM  Vitals:    09/12/22 1315   BP: 126/74   BP Location: Right arm   Patient Position: Sitting   BP Method: Medium (Automatic)   Temp: 98.1 °F (36.7 °C)   TempSrc: Tympanic   Weight: 77.4 kg (170 lb  "10.2 oz)   Height: 5' 3" (1.6 m)       Physical Exam  Constitutional:       General: She is not in acute distress.     Appearance: Normal appearance. She is well-developed.   HENT:      Head: Normocephalic and atraumatic.      Right Ear: Tympanic membrane and external ear normal.      Left Ear: Tympanic membrane and external ear normal.      Nose: Nose normal.      Mouth/Throat:      Dentition: Normal dentition.      Pharynx: Uvula midline.   Eyes:      General: Lids are normal.      Conjunctiva/sclera: Conjunctivae normal.      Pupils: Pupils are equal, round, and reactive to light.   Neck:      Thyroid: No thyromegaly.      Trachea: Trachea normal.   Cardiovascular:      Rate and Rhythm: Normal rate and regular rhythm.      Pulses: Normal pulses.      Heart sounds: Normal heart sounds, S1 normal and S2 normal. No murmur heard.    No friction rub. No gallop.   Pulmonary:      Effort: Pulmonary effort is normal.      Breath sounds: Normal breath sounds. No wheezing or rales.   Abdominal:      General: Bowel sounds are normal.      Palpations: Abdomen is soft. There is no mass.      Tenderness: There is no abdominal tenderness. There is no guarding or rebound.   Musculoskeletal:         General: Normal range of motion.      Cervical back: Normal range of motion and neck supple.      Comments: No scoliosis.   Lymphadenopathy:      Cervical: No cervical adenopathy.   Skin:     General: Skin is warm.      Findings: No rash.   Neurological:      Mental Status: She is alert.      Coordination: Coordination normal.      Gait: Gait normal.   Psychiatric:         Speech: Speech normal.         Behavior: Behavior normal.       FOLLOW UP  6 months with Dr. Lane for follow up and refills.     Future Appointments   Date Time Provider Department Center   9/19/2022  1:15 PM Domingo Gonzalez MD [facility] RHEUM [place]   4/11/2023  1:00 PM [facility]  MAMMO1 SCREEN [facility] MAMMO [place] [facility]   4/11/2023  1:50 PM Gwen Krishnan MD [facility] " [place]       Problem List Items Addressed This Visit          Cardiac/Vascular    Hypertension associated with diabetes - Primary (Chronic)    Current Assessment & Plan     BP Readings from Last 3 Encounters:   04/05/22 130/70   03/14/22 97/63   02/02/22 120/60   This condition is stable. We will continue current treatment regimen.            Relevant Medications    semaglutide (OZEMPIC) 0.25 mg or 0.5 mg(2 mg/1.5 mL) pen injector    amlodipine-benazepril 2.5-10 mg (LOTREL) 2.5-10 mg per capsule    metFORMIN (GLUCOPHAGE) 500 MG tablet    Hyperlipidemia associated with type 2 diabetes mellitus (Chronic)    Current Assessment & Plan     Lab Results   Component Value Date    CHOL 159 06/21/2021    CHOL 200 (H) 11/02/2020    CHOL 213 (H) 10/28/2019     Lab Results   Component Value Date    HDL 43 06/21/2021    HDL 51 11/02/2020    HDL 49 10/28/2019     Lab Results   Component Value Date    LDLCALC 90.8 06/21/2021    LDLCALC 120.4 11/02/2020    LDLCALC 136.6 10/28/2019     Lab Results   Component Value Date    TRIG 126 06/21/2021    TRIG 143 11/02/2020    TRIG 137 10/28/2019     Lab Results   Component Value Date    CHOLHDL 27.0 06/21/2021    CHOLHDL 25.5 11/02/2020    CHOLHDL 23.0 10/28/2019   Patient is overdue for FLP. Order placed, encouraged patient to schedule ASAP.          Relevant Medications    semaglutide (OZEMPIC) 0.25 mg or 0.5 mg(2 mg/1.5 mL) pen injector    metFORMIN (GLUCOPHAGE) 500 MG tablet    Chronic venous hypertension involving legs, L > R (Chronic)    Current Assessment & Plan     Stable, no evidence of skin breakdown          Peripheral vascular disease, unspecified    Current Assessment & Plan     This condition is stable. We will continue current treatment regimen.             Immunology/Multi System    Seronegative rheumatoid arthritis    Overview     Rheumatologist: Dr. Domingo Gonzalez             Endocrine    Type 2 diabetes mellitus, without long-term current use of insulin     Current Assessment & Plan     Lab Results   Component Value Date    HGBA1C 5.4 06/20/2022   -Significantly improved. Continue current regimen.          Relevant Medications    semaglutide (OZEMPIC) 0.25 mg or 0.5 mg(2 mg/1.5 mL) pen injector    metFORMIN (GLUCOPHAGE) 500 MG tablet    Weight loss    Current Assessment & Plan     Wt Readings from Last 3 Encounters:   09/12/22 1315 77.4 kg (170 lb 10.2 oz)   06/20/22 1143 77.3 kg (170 lb 6.7 oz)   04/05/22 1448 77.1 kg (170 lb)   -Weight loss with Ozempic               Orthopedic    Generalized osteoarthritis of multiple sites (Chronic)    Primary osteoarthritis of both hands     Other Visit Diagnoses       Need for pneumococcal vaccination        Need for influenza vaccination        Relevant Orders    Influenza (FLUAD) - Quadrivalent (Adjuvanted) *Preferred* (65+) (PF)        Unless noted herein, any chronic conditions are represented as and appear compensated/controlled and stable, and no other significant complaints or concerns were reported.    PRESCRIPTION DRUG MANAGEMENT  Outpatient Medications Prior to Visit   Medication Sig Dispense Refill    aspirin (ECOTRIN) 81 MG EC tablet Take 81 mg by mouth once daily.      atorvastatin (LIPITOR) 40 MG tablet Take 1 tablet (40 mg total) by mouth every evening. 90 tablet 2    blood sugar diagnostic Strp Check blood glucose 1-2 times daily (BEFORE breakfast or other meal) and as needed for symptoms of low or high blood glucose 200 each 11    blood-glucose meter kit Check blood glucose 1-2 times daily (BEFORE breakfast or other meal) and as needed for symptoms of low or high blood glucose 1 each 0    celecoxib (CELEBREX) 200 MG capsule Take 1 capsule by mouth 2 times daily. 180 capsule 1    cetirizine (ZYRTEC) 5 MG tablet Take 5 mg by mouth once daily.      cholecalciferol, vitamin D3, (VITAMIN D3) 50 mcg (2,000 unit) Cap Take 1 capsule by mouth once daily.      folic acid (FOLVITE) 1 MG tablet Take 1 tablet (1 mg total) by  "mouth once daily. 90 tablet 2    GLUCOSAMINE/D3/BOSWELLIA MAYELIN (OSTEO BI-FLEX, 5-LOXIN, ORAL) Take 2 tablets by mouth once daily.      lancets Misc Check blood glucose 1-2 times daily (BEFORE breakfast or other meal) and as needed for symptoms of low or high blood glucose 200 each 11    metaxalone (SKELAXIN) 800 MG tablet TAKE 1 TABLET TWICE DAILY AS NEEDED FOR SPASMS  1    methotrexate 2.5 MG Tab Take 4 tablets (10 mg total) by mouth every 7 days. 16 tablet 3    multivit-mineral-iron-lutein Tab Take 1 tablet by mouth once daily.      amlodipine-benazepril 2.5-10 mg (LOTREL) 2.5-10 mg per capsule Take 1 capsule by mouth once daily. 90 capsule 2    metFORMIN (GLUCOPHAGE) 500 MG tablet Take 2 tablets (1,000 mg total) by mouth 2 (two) times daily with meals. 360 tablet 2    semaglutide (OZEMPIC) 0.25 mg or 0.5 mg(2 mg/1.5 mL) pen injector Inject 0.5 mg into the skin every 7 days. for 999 doses 1 pen 0     No facility-administered medications prior to visit.     Documentation entered by me for this encounter may have been done in part using speech-recognition technology. Although I have made an effort to ensure accuracy, "sound like" errors may exist and should be interpreted in context.        "

## 2022-09-12 NOTE — ASSESSMENT & PLAN NOTE
BP Readings from Last 3 Encounters:   04/05/22 130/70   03/14/22 97/63   02/02/22 120/60     This condition is stable. We will continue current treatment regimen.

## 2022-09-12 NOTE — PATIENT INSTRUCTIONS
I strongly recommend that you get your COVID-19 vaccine boosters to be completely vaccinated against COVID-19.Those who have been completely vaccinated are much less likely to suffer severe illness from the COVID-19 virus.     If your primary COVID-19 vaccine series was Pfizer, you should get a Pfizer booster.    You can learn more about the COVID-19 vaccine and booster shot options at https://www.[email or web address].org/coronavirus/vaccine-faqs.    The quickest and easiest way to schedule an appointment for your COVID-19 booster vaccination is by using [facility] or by calling [phone].    As always, thank you for letting me participate in your care.     Daysi Eastman FNP-C

## 2022-09-12 NOTE — ASSESSMENT & PLAN NOTE
Wt Readings from Last 3 Encounters:   09/12/22 1315 77.4 kg (170 lb 10.2 oz)   06/20/22 1143 77.3 kg (170 lb 6.7 oz)   04/05/22 1448 77.1 kg (170 lb)     -Weight loss with Ozempic

## 2022-09-12 NOTE — ASSESSMENT & PLAN NOTE
Lab Results   Component Value Date    CHOL 159 06/21/2021    CHOL 200 (H) 11/02/2020    CHOL 213 (H) 10/28/2019     Lab Results   Component Value Date    HDL 43 06/21/2021    HDL 51 11/02/2020    HDL 49 10/28/2019     Lab Results   Component Value Date    LDLCALC 90.8 06/21/2021    LDLCALC 120.4 11/02/2020    LDLCALC 136.6 10/28/2019     Lab Results   Component Value Date    TRIG 126 06/21/2021    TRIG 143 11/02/2020    TRIG 137 10/28/2019     Lab Results   Component Value Date    CHOLHDL 27.0 06/21/2021    CHOLHDL 25.5 11/02/2020    CHOLHDL 23.0 10/28/2019     Patient is overdue for FLP. Order placed, encouraged patient to schedule ASAP.

## 2022-09-13 NOTE — PROGRESS NOTES
OFFICE VISIT 9/12/22  1:00 PM CDT    CHIEF COMPLAINT: Medication Refill      HPI:   Ms. Montoya presents today for 6 month follow up with medication refills. May reports that her chronic conditions are stable, and she reports no new complaints or concerns unless noted herein below. She says she is doing well on her current medicines, she reports preceiving no adverse side-effects and wants to continue current treatment She has lost 50 pounds since initiation of Ozempic and endorses feeling significantly better overall.    DIAGNOSES ADDRESSED THIS VISIT   1. Hypertension associated with diabetes  Assessment & Plan:  BP Readings from Last 3 Encounters:   04/05/22 130/70   03/14/22 97/63   02/02/22 120/60     This condition is stable. We will continue current treatment regimen.       Orders:  -     amlodipine-benazepril 2.5-10 mg (LOTREL) 2.5-10 mg per capsule    2. Hyperlipidemia associated with type 2 diabetes mellitus  Assessment & Plan:  Lab Results   Component Value Date    CHOL 159 06/21/2021    CHOL 200 (H) 11/02/2020    CHOL 213 (H) 10/28/2019     Lab Results   Component Value Date    HDL 43 06/21/2021    HDL 51 11/02/2020    HDL 49 10/28/2019     Lab Results   Component Value Date    LDLCALC 90.8 06/21/2021    LDLCALC 120.4 11/02/2020    LDLCALC 136.6 10/28/2019     Lab Results   Component Value Date    TRIG 126 06/21/2021    TRIG 143 11/02/2020    TRIG 137 10/28/2019     Lab Results   Component Value Date    CHOLHDL 27.0 06/21/2021    CHOLHDL 25.5 11/02/2020    CHOLHDL 23.0 10/28/2019     Patient is overdue for FLP. Order placed, encouraged patient to schedule ASAP. We will continue current regimen unless otherwise indicated after lab work.       3. Peripheral vascular disease, unspecified  Assessment & Plan:  This condition is stable. We will continue current treatment regimen.       4. Type 2 diabetes mellitus with diabetic polyneuropathy, without long-term current use of insulin  Assessment &  Plan:  Diabetes Management Status    Statin: Taking  ACE/ARB: Taking    Screening or Prevention Patient's value Goal Complete/Controlled?   HgA1C Testing and Control   Lab Results   Component Value Date    HGBA1C 5.4 06/20/2022      Annually/Less than 8% Yes     Lipid profile : 06/21/2021 Annually No     LDL control Lab Results   Component Value Date    LDLCALC 90.8 06/21/2021    Annually/Less than 100 mg/dl  No     Nephropathy screening Lab Results   Component Value Date    LABMICR 11.0 02/07/2022     Lab Results   Component Value Date    PROTEINUA Negative 05/26/2020    Annually Yes     Blood pressure BP Readings from Last 1 Encounters:   09/12/22 126/74    Less than 140/90 Yes     Dilated retinal exam : 08/22/2022 Annually Yes     Foot exam   : 02/02/2022 Annually Yes       Lab Results   Component Value Date    HGBA1C 5.4 06/20/2022    HGBA1C 6.0 (H) 12/13/2021    HGBA1C 6.7 (H) 08/16/2021    MICALBCREAT 10.9 02/07/2022    LDLCALC 90.8 06/21/2021     -Significantly improved Alc. We will continue current regimen and continue to monitor.     Orders:  -     semaglutide (OZEMPIC) 0.25 mg or 0.5 mg(2 mg/1.5 mL) pen injector  -     metFORMIN (GLUCOPHAGE) 500 MG tablet    5. Primary osteoarthritis of both hands  -This condition is stable. We will continue to monitor     Seronegative rheumatoid arthritis  Overview:  Rheumatologist: Dr. Domingo Gonzalez     Chronic venous hypertension involving legs, L > R  Assessment & Plan:  Stable, no evidence of skin breakdown     Weight loss  Assessment & Plan:  Wt Readings from Last 3 Encounters:   09/12/22 1315 77.4 kg (170 lb 10.2 oz)   06/20/22 1143 77.3 kg (170 lb 6.7 oz)   04/05/22 1448 77.1 kg (170 lb)     -Weight loss has been notable since initiating Ozempic. Encouraged continued efforts.     Need for pneumococcal vaccination  -     (In Office Administered) Pneumococcal Conjugate Vaccine (20 Valent) (IM)    Need for influenza vaccination  -     Influenza (FLUAD) -  "Quadrivalent (Adjuvanted) *Preferred* (65+) (PF)         Unless noted herein, any chronic conditions are represented as and appear compensated and stable. No other significant complaints or concerns were reported.     FOLLOW UP  Follow up in about 6 months (around 3/12/2023), or if symptoms worsen or fail to improve.  Future Appointments   Date Time Provider Department Center   9/14/2022  8:35 AM LABORATORY, [facility]   9/19/2022  1:15 PM Domingo Gonzalez MD [facility] RHEUM [place]   3/13/2023 10:30 AM YAHAIRA Lane MD [facility]   4/11/2023  1:00 PM [facility]  MAMMO1 SCREEN [facility] M[facility]MO [place] [facility]   4/11/2023  1:50 PM Gwen Krishnan MD [facility]BGYN [place] [facility]       REVIEW OF SYSTEMS  Constitutional:  Negative for chills and fever.   HENT:  Negative for congestion, ear pain, hearing loss and sinus pain.    Eyes:  Negative for blurred vision, discharge and redness.   Respiratory:  Negative for cough, hemoptysis, sputum production, shortness of breath and wheezing.    Cardiovascular:  Negative for chest pain, palpitations, claudication and leg swelling.   Gastrointestinal:  Negative for abdominal pain, constipation, diarrhea, heartburn, nausea and vomiting.   Genitourinary:  Negative for dysuria, frequency and urgency.   Musculoskeletal:  Negative for back pain, falls, joint pain, myalgias and neck pain.   Skin:  Negative for itching and rash.   Neurological:  Negative for dizziness, tingling, sensory change, focal weakness, weakness and headaches.   Psychiatric/Behavioral:  Negative for depression.          PHYSICAL EXAM  Vitals:    09/12/22 1315   BP: 126/74   BP Location: Right arm   Patient Position: Sitting   BP Method: Medium (Automatic)   Temp: 98.1 °F (36.7 °C)   TempSrc: Tympanic   Weight: 77.4 kg (170 lb 10.2 oz)   Height: 5' 3" (1.6 m)      Constitutional:       General: She is not in acute distress.     Appearance: Normal appearance. She is well-developed.   HENT:      Head: Normocephalic " and atraumatic.      Right Ear: Tympanic membrane and external ear normal.      Left Ear: Tympanic membrane and external ear normal.      Nose: Nose normal.      Mouth/Throat:      Dentition: Normal dentition.      Pharynx: Uvula midline.   Eyes:      General: Lids are normal.      Conjunctiva/sclera: Conjunctivae normal.      Pupils: Pupils are equal, round, and reactive to light.   Neck:      Thyroid: No thyromegaly.      Trachea: Trachea normal.   Cardiovascular:      Rate and Rhythm: Normal rate and regular rhythm.      Pulses: Normal pulses.      Heart sounds: Normal heart sounds, S1 normal and S2 normal. No murmur heard.    No friction rub. No gallop.   Pulmonary:      Effort: Pulmonary effort is normal.      Breath sounds: Normal breath sounds. No wheezing or rales.   Abdominal:      General: Bowel sounds are normal.      Palpations: Abdomen is soft. There is no mass.      Tenderness: There is no abdominal tenderness. There is no guarding or rebound.   Musculoskeletal:         General: Normal range of motion.      Cervical back: Normal range of motion and neck supple.      Comments: No scoliosis.   Lymphadenopathy:      Cervical: No cervical adenopathy.   Skin:     General: Skin is warm.      Findings: No rash.   Neurological:      Mental Status: She is alert.      Coordination: Coordination normal.      Gait: Gait normal.   Psychiatric:         Speech: Speech normal.         Behavior: Behavior normal.       PRESCRIPTION DRUG MANAGEMENT  Outpatient Medications Prior to Visit   Medication Sig Dispense Refill    aspirin (ECOTRIN) 81 MG EC tablet Take 81 mg by mouth once daily.      atorvastatin (LIPITOR) 40 MG tablet Take 1 tablet (40 mg total) by mouth every evening. 90 tablet 2    blood sugar diagnostic Strp Check blood glucose 1-2 times daily (BEFORE breakfast or other meal) and as needed for symptoms of low or high blood glucose 200 each 11    blood-glucose meter kit Check blood glucose 1-2 times daily  (BEFORE breakfast or other meal) and as needed for symptoms of low or high blood glucose 1 each 0    celecoxib (CELEBREX) 200 MG capsule Take 1 capsule by mouth 2 times daily. 180 capsule 1    cetirizine (ZYRTEC) 5 MG tablet Take 5 mg by mouth once daily.      cholecalciferol, vitamin D3, (VITAMIN D3) 50 mcg (2,000 unit) Cap Take 1 capsule by mouth once daily.      folic acid (FOLVITE) 1 MG tablet Take 1 tablet (1 mg total) by mouth once daily. 90 tablet 2    GLUCOSAMINE/D3/BOSWELLIA MAYELIN (OSTEO BI-FLEX, 5-LOXIN, ORAL) Take 2 tablets by mouth once daily.      lancets Misc Check blood glucose 1-2 times daily (BEFORE breakfast or other meal) and as needed for symptoms of low or high blood glucose 200 each 11    metaxalone (SKELAXIN) 800 MG tablet TAKE 1 TABLET TWICE DAILY AS NEEDED FOR SPASMS  1    methotrexate 2.5 MG Tab Take 4 tablets (10 mg total) by mouth every 7 days. 16 tablet 3    multivit-mineral-iron-lutein Tab Take 1 tablet by mouth once daily.      amlodipine-benazepril 2.5-10 mg (LOTREL) 2.5-10 mg per capsule Take 1 capsule by mouth once daily. 90 capsule 2    metFORMIN (GLUCOPHAGE) 500 MG tablet Take 2 tablets (1,000 mg total) by mouth 2 (two) times daily with meals. 360 tablet 2    semaglutide (OZEMPIC) 0.25 mg or 0.5 mg(2 mg/1.5 mL) pen injector Inject 0.5 mg into the skin every 7 days. for 999 doses 1 pen 0     No facility-administered medications prior to visit.     Medications Discontinued During This Encounter   Medication Reason    metFORMIN (GLUCOPHAGE) 500 MG tablet Reorder    amlodipine-benazepril 2.5-10 mg (LOTREL) 2.5-10 mg per capsule Reorder    semaglutide (OZEMPIC) 0.25 mg or 0.5 mg(2 mg/1.5 mL) pen injector Reorder     Medications Ordered This Encounter   Medications    amlodipine-benazepril 2.5-10 mg (LOTREL) 2.5-10 mg per capsule     Sig: Take 1 capsule by mouth once daily.     Dispense:  90 capsule     Refill:  2     -    metFORMIN (GLUCOPHAGE) 500 MG tablet     Sig: Take 2 tablets  "(1,000 mg total) by mouth 2 (two) times daily with meals.     Dispense:  360 tablet     Refill:  2     -    semaglutide (OZEMPIC) 0.25 mg or 0.5 mg(2 mg/1.5 mL) pen injector     Sig: Inject 0.5 mg into the skin every 7 days. for 999 doses     Dispense:  3 pen     Refill:  2       Documentation entered by me for this encounter may have been done in part using speech-recognition technology. Although I have made an effort to ensure accuracy, "sound like" errors may exist and should be interpreted in context.        "

## 2022-09-14 ENCOUNTER — LAB VISIT (OUTPATIENT)
Dept: LAB | Facility: HOSPITAL | Age: 67
End: 2022-09-14
Attending: FAMILY MEDICINE
Payer: MEDICARE

## 2022-09-14 DIAGNOSIS — E11.9 TYPE 2 DIABETES MELLITUS WITHOUT COMPLICATION: ICD-10-CM

## 2022-09-14 LAB
CHOLEST SERPL-MCNC: 150 MG/DL (ref 120–199)
CHOLEST/HDLC SERPL: 3.3 {RATIO} (ref 2–5)
HDLC SERPL-MCNC: 46 MG/DL (ref 40–75)
HDLC SERPL: 30.7 % (ref 20–50)
LDLC SERPL CALC-MCNC: 85.2 MG/DL (ref 63–159)
NONHDLC SERPL-MCNC: 104 MG/DL
TRIGL SERPL-MCNC: 94 MG/DL (ref 30–150)

## 2022-09-14 PROCEDURE — 36415 COLL VENOUS BLD VENIPUNCTURE: CPT | Performed by: FAMILY MEDICINE

## 2022-09-14 PROCEDURE — 80061 LIPID PANEL: CPT | Performed by: FAMILY MEDICINE

## 2022-09-19 ENCOUNTER — OFFICE VISIT (OUTPATIENT)
Dept: RHEUMATOLOGY | Facility: CLINIC | Age: 67
End: 2022-09-19
Payer: MEDICARE

## 2022-09-19 ENCOUNTER — LAB VISIT (OUTPATIENT)
Dept: LAB | Facility: HOSPITAL | Age: 67
End: 2022-09-19
Attending: INTERNAL MEDICINE
Payer: MEDICARE

## 2022-09-19 VITALS
HEIGHT: 63 IN | BODY MASS INDEX: 30.16 KG/M2 | HEART RATE: 106 BPM | WEIGHT: 170.19 LBS | SYSTOLIC BLOOD PRESSURE: 132 MMHG | DIASTOLIC BLOOD PRESSURE: 77 MMHG

## 2022-09-19 DIAGNOSIS — D84.9 IMMUNOCOMPROMISED: ICD-10-CM

## 2022-09-19 DIAGNOSIS — M19.041 PRIMARY OSTEOARTHRITIS OF BOTH HANDS: ICD-10-CM

## 2022-09-19 DIAGNOSIS — M19.042 PRIMARY OSTEOARTHRITIS OF BOTH HANDS: ICD-10-CM

## 2022-09-19 DIAGNOSIS — M06.00 SERONEGATIVE RHEUMATOID ARTHRITIS: Primary | ICD-10-CM

## 2022-09-19 DIAGNOSIS — Z79.899 LONG TERM CURRENT USE OF IMMUNOSUPPRESSIVE DRUG: ICD-10-CM

## 2022-09-19 DIAGNOSIS — M06.00 SERONEGATIVE RHEUMATOID ARTHRITIS: ICD-10-CM

## 2022-09-19 LAB
ALBUMIN SERPL BCP-MCNC: 3.8 G/DL (ref 3.5–5.2)
ALP SERPL-CCNC: 49 U/L (ref 55–135)
ALT SERPL W/O P-5'-P-CCNC: 23 U/L (ref 10–44)
ANION GAP SERPL CALC-SCNC: 11 MMOL/L (ref 8–16)
AST SERPL-CCNC: 19 U/L (ref 10–40)
BASOPHILS # BLD AUTO: 0.03 K/UL (ref 0–0.2)
BASOPHILS NFR BLD: 0.5 % (ref 0–1.9)
BILIRUB SERPL-MCNC: 0.4 MG/DL (ref 0.1–1)
BUN SERPL-MCNC: 11 MG/DL (ref 8–23)
CALCIUM SERPL-MCNC: 9.5 MG/DL (ref 8.7–10.5)
CHLORIDE SERPL-SCNC: 104 MMOL/L (ref 95–110)
CO2 SERPL-SCNC: 27 MMOL/L (ref 23–29)
CREAT SERPL-MCNC: 0.7 MG/DL (ref 0.5–1.4)
CRP SERPL-MCNC: 1.1 MG/L (ref 0–8.2)
DIFFERENTIAL METHOD: ABNORMAL
EOSINOPHIL # BLD AUTO: 0.2 K/UL (ref 0–0.5)
EOSINOPHIL NFR BLD: 4 % (ref 0–8)
ERYTHROCYTE [DISTWIDTH] IN BLOOD BY AUTOMATED COUNT: 12.7 % (ref 11.5–14.5)
ERYTHROCYTE [SEDIMENTATION RATE] IN BLOOD BY WESTERGREN METHOD: 8 MM/HR (ref 0–20)
EST. GFR  (NO RACE VARIABLE): >60 ML/MIN/1.73 M^2
GLUCOSE SERPL-MCNC: 118 MG/DL (ref 70–110)
HCT VFR BLD AUTO: 37.4 % (ref 37–48.5)
HGB BLD-MCNC: 12.5 G/DL (ref 12–16)
IMM GRANULOCYTES # BLD AUTO: 0.02 K/UL (ref 0–0.04)
IMM GRANULOCYTES NFR BLD AUTO: 0.4 % (ref 0–0.5)
LYMPHOCYTES # BLD AUTO: 1.8 K/UL (ref 1–4.8)
LYMPHOCYTES NFR BLD: 31.5 % (ref 18–48)
MCH RBC QN AUTO: 31.6 PG (ref 27–31)
MCHC RBC AUTO-ENTMCNC: 33.4 G/DL (ref 32–36)
MCV RBC AUTO: 94 FL (ref 82–98)
MONOCYTES # BLD AUTO: 0.4 K/UL (ref 0.3–1)
MONOCYTES NFR BLD: 7.7 % (ref 4–15)
NEUTROPHILS # BLD AUTO: 3.2 K/UL (ref 1.8–7.7)
NEUTROPHILS NFR BLD: 55.9 % (ref 38–73)
NRBC BLD-RTO: 0 /100 WBC
PLATELET # BLD AUTO: 278 K/UL (ref 150–450)
PMV BLD AUTO: 10.4 FL (ref 9.2–12.9)
POTASSIUM SERPL-SCNC: 4.2 MMOL/L (ref 3.5–5.1)
PROT SERPL-MCNC: 6.6 G/DL (ref 6–8.4)
RBC # BLD AUTO: 3.96 M/UL (ref 4–5.4)
SODIUM SERPL-SCNC: 142 MMOL/L (ref 136–145)
WBC # BLD AUTO: 5.68 K/UL (ref 3.9–12.7)

## 2022-09-19 PROCEDURE — 85651 RBC SED RATE NONAUTOMATED: CPT | Performed by: INTERNAL MEDICINE

## 2022-09-19 PROCEDURE — 3288F PR FALLS RISK ASSESSMENT DOCUMENTED: ICD-10-PCS | Mod: CPTII,S$GLB,, | Performed by: INTERNAL MEDICINE

## 2022-09-19 PROCEDURE — 3008F PR BODY MASS INDEX (BMI) DOCUMENTED: ICD-10-PCS | Mod: CPTII,S$GLB,, | Performed by: INTERNAL MEDICINE

## 2022-09-19 PROCEDURE — 3072F LOW RISK FOR RETINOPATHY: CPT | Mod: CPTII,S$GLB,, | Performed by: INTERNAL MEDICINE

## 2022-09-19 PROCEDURE — 1101F PT FALLS ASSESS-DOCD LE1/YR: CPT | Mod: CPTII,S$GLB,, | Performed by: INTERNAL MEDICINE

## 2022-09-19 PROCEDURE — 99214 OFFICE O/P EST MOD 30 MIN: CPT | Mod: S$GLB,,, | Performed by: INTERNAL MEDICINE

## 2022-09-19 PROCEDURE — 1160F RVW MEDS BY RX/DR IN RCRD: CPT | Mod: CPTII,S$GLB,, | Performed by: INTERNAL MEDICINE

## 2022-09-19 PROCEDURE — 80053 COMPREHEN METABOLIC PANEL: CPT | Performed by: INTERNAL MEDICINE

## 2022-09-19 PROCEDURE — 36415 COLL VENOUS BLD VENIPUNCTURE: CPT | Mod: PO | Performed by: INTERNAL MEDICINE

## 2022-09-19 PROCEDURE — 3066F PR DOCUMENTATION OF TREATMENT FOR NEPHROPATHY: ICD-10-PCS | Mod: CPTII,S$GLB,, | Performed by: INTERNAL MEDICINE

## 2022-09-19 PROCEDURE — 3061F PR NEG MICROALBUMINURIA RESULT DOCUMENTED/REVIEW: ICD-10-PCS | Mod: CPTII,S$GLB,, | Performed by: INTERNAL MEDICINE

## 2022-09-19 PROCEDURE — 3075F PR MOST RECENT SYSTOLIC BLOOD PRESS GE 130-139MM HG: ICD-10-PCS | Mod: CPTII,S$GLB,, | Performed by: INTERNAL MEDICINE

## 2022-09-19 PROCEDURE — 3078F DIAST BP <80 MM HG: CPT | Mod: CPTII,S$GLB,, | Performed by: INTERNAL MEDICINE

## 2022-09-19 PROCEDURE — 1159F PR MEDICATION LIST DOCUMENTED IN MEDICAL RECORD: ICD-10-PCS | Mod: CPTII,S$GLB,, | Performed by: INTERNAL MEDICINE

## 2022-09-19 PROCEDURE — 4010F ACE/ARB THERAPY RXD/TAKEN: CPT | Mod: CPTII,S$GLB,, | Performed by: INTERNAL MEDICINE

## 2022-09-19 PROCEDURE — 3061F NEG MICROALBUMINURIA REV: CPT | Mod: CPTII,S$GLB,, | Performed by: INTERNAL MEDICINE

## 2022-09-19 PROCEDURE — 3288F FALL RISK ASSESSMENT DOCD: CPT | Mod: CPTII,S$GLB,, | Performed by: INTERNAL MEDICINE

## 2022-09-19 PROCEDURE — 99214 PR OFFICE/OUTPT VISIT, EST, LEVL IV, 30-39 MIN: ICD-10-PCS | Mod: S$GLB,,, | Performed by: INTERNAL MEDICINE

## 2022-09-19 PROCEDURE — 99999 PR PBB SHADOW E&M-EST. PATIENT-LVL III: ICD-10-PCS | Mod: PBBFAC,,, | Performed by: INTERNAL MEDICINE

## 2022-09-19 PROCEDURE — 1159F MED LIST DOCD IN RCRD: CPT | Mod: CPTII,S$GLB,, | Performed by: INTERNAL MEDICINE

## 2022-09-19 PROCEDURE — 3066F NEPHROPATHY DOC TX: CPT | Mod: CPTII,S$GLB,, | Performed by: INTERNAL MEDICINE

## 2022-09-19 PROCEDURE — 3075F SYST BP GE 130 - 139MM HG: CPT | Mod: CPTII,S$GLB,, | Performed by: INTERNAL MEDICINE

## 2022-09-19 PROCEDURE — 3078F PR MOST RECENT DIASTOLIC BLOOD PRESSURE < 80 MM HG: ICD-10-PCS | Mod: CPTII,S$GLB,, | Performed by: INTERNAL MEDICINE

## 2022-09-19 PROCEDURE — 4010F PR ACE/ARB THEARPY RXD/TAKEN: ICD-10-PCS | Mod: CPTII,S$GLB,, | Performed by: INTERNAL MEDICINE

## 2022-09-19 PROCEDURE — 3072F PR LOW RISK FOR RETINOPATHY: ICD-10-PCS | Mod: CPTII,S$GLB,, | Performed by: INTERNAL MEDICINE

## 2022-09-19 PROCEDURE — 3008F BODY MASS INDEX DOCD: CPT | Mod: CPTII,S$GLB,, | Performed by: INTERNAL MEDICINE

## 2022-09-19 PROCEDURE — 3044F PR MOST RECENT HEMOGLOBIN A1C LEVEL <7.0%: ICD-10-PCS | Mod: CPTII,S$GLB,, | Performed by: INTERNAL MEDICINE

## 2022-09-19 PROCEDURE — 86140 C-REACTIVE PROTEIN: CPT | Performed by: INTERNAL MEDICINE

## 2022-09-19 PROCEDURE — 1101F PR PT FALLS ASSESS DOC 0-1 FALLS W/OUT INJ PAST YR: ICD-10-PCS | Mod: CPTII,S$GLB,, | Performed by: INTERNAL MEDICINE

## 2022-09-19 PROCEDURE — 3044F HG A1C LEVEL LT 7.0%: CPT | Mod: CPTII,S$GLB,, | Performed by: INTERNAL MEDICINE

## 2022-09-19 PROCEDURE — 1160F PR REVIEW ALL MEDS BY PRESCRIBER/CLIN PHARMACIST DOCUMENTED: ICD-10-PCS | Mod: CPTII,S$GLB,, | Performed by: INTERNAL MEDICINE

## 2022-09-19 PROCEDURE — 85025 COMPLETE CBC W/AUTO DIFF WBC: CPT | Performed by: INTERNAL MEDICINE

## 2022-09-19 PROCEDURE — 99999 PR PBB SHADOW E&M-EST. PATIENT-LVL III: CPT | Mod: PBBFAC,,, | Performed by: INTERNAL MEDICINE

## 2022-09-19 NOTE — PROGRESS NOTES
RHEUMATOLOGY CLINIC FOLLOW UP VISIT  Chief complaints, HPI, ROS, EXAM, Assessment & Plans:-  May Brito a 67 y.o. pleasant female comes in for follow-up visit.  She has seronegative rheumatoid arthritis with MCP joint synovitis on MTX and Celebrex.  .No significant worsening pain or stiffness.   Rheumatological review of system negative otherwise.  Physical examination shows mild synovitis of bilateral 2nd 3rd and 4th MCP joints.  Superimposed osteoarthritic changes small and large joints.  1. Seronegative rheumatoid arthritis    2. Long term current use of immunosuppressive drug    3. Primary osteoarthritis of both hands    4. Immunocompromised      Problem List Items Addressed This Visit       Primary osteoarthritis of both hands    Seronegative rheumatoid arthritis - Primary    Overview     Rheumatologist: Dr. Domingo Gonzalez          Relevant Orders    CBC Auto Differential    Comprehensive Metabolic Panel    C-Reactive Protein    Sedimentation rate    Long term current use of immunosuppressive drug    Relevant Orders    CBC Auto Differential    Comprehensive Metabolic Panel    C-Reactive Protein    Sedimentation rate    Immunocompromised    Overview     From rheumatologic meds.         Relevant Orders    CBC Auto Differential    Comprehensive Metabolic Panel       Stable seronegative RA on methotrexate  and celebrex.   Continue MTX and Celebrex.  Safety and activity markers today  Hold methotrexate if any infection  Safety labs every 12 weeks   # Follow up in about 6 months (around 3/19/2023).      Disclaimer: This note was prepared using voice recognition system and is likely to have sound alike errors and is not proof read.  Please call me with any questions.

## 2022-09-26 ENCOUNTER — IMMUNIZATION (OUTPATIENT)
Dept: PRIMARY CARE CLINIC | Facility: CLINIC | Age: 67
End: 2022-09-26
Payer: MEDICARE

## 2022-09-26 DIAGNOSIS — Z23 NEED FOR VACCINATION: Primary | ICD-10-CM

## 2022-09-26 PROCEDURE — 91312 COVID-19, MRNA, LNP-S, BIVALENT BOOSTER, PF, 30 MCG/0.3 ML DOSE: CPT | Mod: PBBFAC | Performed by: FAMILY MEDICINE

## 2022-09-26 PROCEDURE — 0124A COVID-19, MRNA, LNP-S, BIVALENT BOOSTER, PF, 30 MCG/0.3 ML DOSE: CPT | Mod: CV19,PBBFAC | Performed by: FAMILY MEDICINE

## 2022-12-29 DIAGNOSIS — E11.69 TYPE 2 DIABETES MELLITUS WITH OTHER SPECIFIED COMPLICATION, WITHOUT LONG-TERM CURRENT USE OF INSULIN: Chronic | ICD-10-CM

## 2022-12-29 DIAGNOSIS — E78.5 HYPERLIPIDEMIA ASSOCIATED WITH TYPE 2 DIABETES MELLITUS: Chronic | ICD-10-CM

## 2022-12-29 DIAGNOSIS — E11.69 HYPERLIPIDEMIA ASSOCIATED WITH TYPE 2 DIABETES MELLITUS: Chronic | ICD-10-CM

## 2022-12-29 DIAGNOSIS — I15.2 HYPERTENSION ASSOCIATED WITH DIABETES: Primary | Chronic | ICD-10-CM

## 2022-12-29 DIAGNOSIS — E11.59 HYPERTENSION ASSOCIATED WITH DIABETES: Primary | Chronic | ICD-10-CM

## 2022-12-29 DIAGNOSIS — E11.69 TYPE 2 DIABETES MELLITUS WITH OTHER SPECIFIED COMPLICATION, WITHOUT LONG-TERM CURRENT USE OF INSULIN: ICD-10-CM

## 2022-12-29 NOTE — TELEPHONE ENCOUNTER
----- Message from Laquita Quintana sent at 12/29/2022 10:36 AM CST -----  .Type:  RX Refill Request    Who Called: May   Refill or New Rx:refill  RX Name and Strength:semaglutide (OZEMPIC) 0.25 mg or 0.5 mg(2 mg/1.5 mL) pen injector  How is the patient currently taking it? (ex. 1XDay):as prescribed   Is this a 30 day or 90 day RX:90 days  Preferred Pharmacy with phone number:.  [facility] - [place], [place] - [address]  [address]  [facility] [address]  Phone: [phone] Fax: [phone]  Local or Mail Order:local  Ordering Provider:Dr. Lane  Would the patient rather a call back or a response via [facility]? call  Best Call Back Number:[phone]  Additional Information: Patient is out of medication

## 2022-12-29 NOTE — TELEPHONE ENCOUNTER
LAST ENCOUNTER WITH ME:  2/2/2022  I was unable to approve refill of her Ozempic. REASON: I haven't seen her in almost a year, and she is delinquent for important labs.  I ordered the labs she needs and linked them to her 1/3/23 lab appointment.    TO MY TEAM:   Please notify patient.  Schedule her with Norris Pugh NP or Reshma Cruz PA-C in early January (on or after January 4) to get her care caught up.  Note her 3/13/23 appointments and ask if she wants to move her lab appointment from [place] to [facility].  Tell May I look forward to seeing her at her next appointment on 3/13/2023.    Future Appointments   Date Time Provider Department Center   1/3/2023  2:10 PM LABORATORY, [facility] LAB [place]   3/13/2023  8:40 AM LABORATORY, [facility] LAB [place]   3/13/2023 10:30 AM JAMES Lane MD [facility] IM [place]   3/20/2023  1:15 PM Domingo Gonzalez MD [facility] RHEUM [place]   4/11/2023  1:00 PM LW  MAMMO1 SCREEN [facility] M[facility]MO [place] [facility]   4/11/2023  1:50 PM Gwen Krishnan MD [facility] OBGYN [place] [facility]

## 2022-12-29 NOTE — TELEPHONE ENCOUNTER
No new care gaps identified.  [facility] Embedded Care Gaps. Reference number: [id number]. 12/29/2022   11:34:17 AM CST

## 2022-12-29 NOTE — TELEPHONE ENCOUNTER
I'm unable to approve refill. REASON: I haven't seen her in almost a year, and she is delinquent for important labs.  Please notify patient.  Schedule her with Norris Pugh NP or Reshma Cruz PA-C in early January to get her care caught up.  #LMRX

## 2022-12-29 NOTE — TELEPHONE ENCOUNTER
----- Message from Laquita Quintana sent at 12/29/2022 10:36 AM CST -----  .Type:  RX Refill Request    Who Called: May   Refill or New Rx:refill  RX Name and Strength:semaglutide (OZEMPIC) 0.25 mg or 0.5 mg(2 mg/1.5 mL) pen injector  How is the patient currently taking it? (ex. 1XDay):as prescribed   Is this a 30 day or 90 day RX:90 days  Preferred Pharmacy with phone number:.  [facility] - [place], [place] - [address]  [address]  [facility] [address]  Phone: [phone] Fax: [phone]  Local or Mail Order:local  Ordering Provider:Dr. Lane  Would the patient rather a call back or a response via [facility]? call  Best Call Back Number:[phone]  Additional Information: Patient is out of medication

## 2022-12-30 NOTE — TELEPHONE ENCOUNTER
Spoke with patient and informed her of message from Dr. Lane regarding denial of refill of Ozempic and appointment needed. She verbalized understanding and scheduled appointment with Norris Pugh NP on 1/9/23. She stated that she has been out of the Ozempic now for a week and wants to know if Dr. Lane will give her a refill to last until her appointment on 1/9/23.

## 2022-12-30 NOTE — TELEPHONE ENCOUNTER
No new care gaps identified.  [facility] Embedded Care Gaps. Reference number: [id number]. 12/30/2022   10:33:05 AM CST

## 2023-01-03 ENCOUNTER — LAB VISIT (OUTPATIENT)
Dept: LAB | Facility: HOSPITAL | Age: 68
End: 2023-01-03
Attending: FAMILY MEDICINE
Payer: MEDICARE

## 2023-01-03 ENCOUNTER — TELEPHONE (OUTPATIENT)
Dept: INTERNAL MEDICINE | Facility: CLINIC | Age: 68
End: 2023-01-03
Payer: MEDICARE

## 2023-01-03 ENCOUNTER — PATIENT OUTREACH (OUTPATIENT)
Dept: ADMINISTRATIVE | Facility: HOSPITAL | Age: 68
End: 2023-01-03
Payer: MEDICARE

## 2023-01-03 DIAGNOSIS — E11.59 HYPERTENSION ASSOCIATED WITH DIABETES: Chronic | ICD-10-CM

## 2023-01-03 DIAGNOSIS — E78.5 HYPERLIPIDEMIA ASSOCIATED WITH TYPE 2 DIABETES MELLITUS: Chronic | ICD-10-CM

## 2023-01-03 DIAGNOSIS — E11.69 TYPE 2 DIABETES MELLITUS WITH OTHER SPECIFIED COMPLICATION, WITHOUT LONG-TERM CURRENT USE OF INSULIN: ICD-10-CM

## 2023-01-03 DIAGNOSIS — M06.00 SERONEGATIVE RHEUMATOID ARTHRITIS: ICD-10-CM

## 2023-01-03 DIAGNOSIS — D84.9 IMMUNOCOMPROMISED: ICD-10-CM

## 2023-01-03 DIAGNOSIS — E11.69 HYPERLIPIDEMIA ASSOCIATED WITH TYPE 2 DIABETES MELLITUS: Chronic | ICD-10-CM

## 2023-01-03 DIAGNOSIS — Z79.899 LONG TERM CURRENT USE OF IMMUNOSUPPRESSIVE DRUG: ICD-10-CM

## 2023-01-03 DIAGNOSIS — I15.2 HYPERTENSION ASSOCIATED WITH DIABETES: Chronic | ICD-10-CM

## 2023-01-03 LAB
ALBUMIN SERPL BCP-MCNC: 3.7 G/DL (ref 3.5–5.2)
ALBUMIN/CREAT UR: 12.2 UG/MG (ref 0–30)
ALP SERPL-CCNC: 47 U/L (ref 55–135)
ALT SERPL W/O P-5'-P-CCNC: 21 U/L (ref 10–44)
ANION GAP SERPL CALC-SCNC: 9 MMOL/L (ref 8–16)
AST SERPL-CCNC: 15 U/L (ref 10–40)
BASOPHILS # BLD AUTO: 0.04 K/UL (ref 0–0.2)
BASOPHILS NFR BLD: 0.6 % (ref 0–1.9)
BILIRUB SERPL-MCNC: 0.5 MG/DL (ref 0.1–1)
BUN SERPL-MCNC: 13 MG/DL (ref 8–23)
CALCIUM SERPL-MCNC: 9.2 MG/DL (ref 8.7–10.5)
CHLORIDE SERPL-SCNC: 106 MMOL/L (ref 95–110)
CO2 SERPL-SCNC: 26 MMOL/L (ref 23–29)
CREAT SERPL-MCNC: 0.7 MG/DL (ref 0.5–1.4)
CREAT UR-MCNC: 82 MG/DL (ref 15–325)
CRP SERPL-MCNC: 1.3 MG/L (ref 0–8.2)
DIFFERENTIAL METHOD: ABNORMAL
EOSINOPHIL # BLD AUTO: 0.3 K/UL (ref 0–0.5)
EOSINOPHIL NFR BLD: 4.3 % (ref 0–8)
ERYTHROCYTE [DISTWIDTH] IN BLOOD BY AUTOMATED COUNT: 12.5 % (ref 11.5–14.5)
ERYTHROCYTE [SEDIMENTATION RATE] IN BLOOD BY WESTERGREN METHOD: 15 MM/HR (ref 0–20)
EST. GFR  (NO RACE VARIABLE): >60 ML/MIN/1.73 M^2
ESTIMATED AVG GLUCOSE: 111 MG/DL (ref 68–131)
GLUCOSE SERPL-MCNC: 112 MG/DL (ref 70–110)
HBA1C MFR BLD: 5.5 % (ref 4–5.6)
HCT VFR BLD AUTO: 38.6 % (ref 37–48.5)
HGB BLD-MCNC: 12.8 G/DL (ref 12–16)
IMM GRANULOCYTES # BLD AUTO: 0.02 K/UL (ref 0–0.04)
IMM GRANULOCYTES NFR BLD AUTO: 0.3 % (ref 0–0.5)
LYMPHOCYTES # BLD AUTO: 1.8 K/UL (ref 1–4.8)
LYMPHOCYTES NFR BLD: 28.3 % (ref 18–48)
MCH RBC QN AUTO: 31.3 PG (ref 27–31)
MCHC RBC AUTO-ENTMCNC: 33.2 G/DL (ref 32–36)
MCV RBC AUTO: 94 FL (ref 82–98)
MICROALBUMIN UR DL<=1MG/L-MCNC: 10 UG/ML
MONOCYTES # BLD AUTO: 0.5 K/UL (ref 0.3–1)
MONOCYTES NFR BLD: 7.1 % (ref 4–15)
NEUTROPHILS # BLD AUTO: 3.8 K/UL (ref 1.8–7.7)
NEUTROPHILS NFR BLD: 59.4 % (ref 38–73)
NRBC BLD-RTO: 0 /100 WBC
PLATELET # BLD AUTO: 282 K/UL (ref 150–450)
PMV BLD AUTO: 10.1 FL (ref 9.2–12.9)
POTASSIUM SERPL-SCNC: 3.9 MMOL/L (ref 3.5–5.1)
PROT SERPL-MCNC: 6.5 G/DL (ref 6–8.4)
RBC # BLD AUTO: 4.09 M/UL (ref 4–5.4)
SODIUM SERPL-SCNC: 141 MMOL/L (ref 136–145)
WBC # BLD AUTO: 6.32 K/UL (ref 3.9–12.7)

## 2023-01-03 PROCEDURE — 82570 ASSAY OF URINE CREATININE: CPT | Performed by: FAMILY MEDICINE

## 2023-01-03 PROCEDURE — 83036 HEMOGLOBIN GLYCOSYLATED A1C: CPT | Performed by: FAMILY MEDICINE

## 2023-01-03 PROCEDURE — 85651 RBC SED RATE NONAUTOMATED: CPT | Performed by: INTERNAL MEDICINE

## 2023-01-03 PROCEDURE — 36415 COLL VENOUS BLD VENIPUNCTURE: CPT | Mod: PO | Performed by: INTERNAL MEDICINE

## 2023-01-03 PROCEDURE — 80053 COMPREHEN METABOLIC PANEL: CPT | Performed by: INTERNAL MEDICINE

## 2023-01-03 PROCEDURE — 85025 COMPLETE CBC W/AUTO DIFF WBC: CPT | Performed by: INTERNAL MEDICINE

## 2023-01-03 PROCEDURE — 86140 C-REACTIVE PROTEIN: CPT | Performed by: INTERNAL MEDICINE

## 2023-01-03 NOTE — TELEPHONE ENCOUNTER
----- Message from Padmaja Tamez sent at 1/3/2023  9:18 AM CST -----  Patient is requesting a call back in regards to refill of OZEMPIC,Please call [phone].Thanks

## 2023-01-03 NOTE — TELEPHONE ENCOUNTER
Spoke with patient who was checking to see if Dr. Lane refilled her Ozempic as requested. Informed her that he sent it to [facility] this morning. She verbalized understanding.

## 2023-01-09 ENCOUNTER — OFFICE VISIT (OUTPATIENT)
Dept: INTERNAL MEDICINE | Facility: CLINIC | Age: 68
End: 2023-01-09
Payer: MEDICARE

## 2023-01-09 VITALS
TEMPERATURE: 97 F | SYSTOLIC BLOOD PRESSURE: 134 MMHG | HEART RATE: 78 BPM | OXYGEN SATURATION: 97 % | DIASTOLIC BLOOD PRESSURE: 72 MMHG | WEIGHT: 175.69 LBS | RESPIRATION RATE: 16 BRPM | BODY MASS INDEX: 31.13 KG/M2 | HEIGHT: 63 IN

## 2023-01-09 DIAGNOSIS — E11.69 HYPERLIPIDEMIA ASSOCIATED WITH TYPE 2 DIABETES MELLITUS: Chronic | ICD-10-CM

## 2023-01-09 DIAGNOSIS — E11.59 HYPERTENSION ASSOCIATED WITH DIABETES: Chronic | ICD-10-CM

## 2023-01-09 DIAGNOSIS — M06.00 SERONEGATIVE RHEUMATOID ARTHRITIS: ICD-10-CM

## 2023-01-09 DIAGNOSIS — E78.5 HYPERLIPIDEMIA ASSOCIATED WITH TYPE 2 DIABETES MELLITUS: Chronic | ICD-10-CM

## 2023-01-09 DIAGNOSIS — E11.69 TYPE 2 DIABETES MELLITUS WITH OTHER SPECIFIED COMPLICATION, WITHOUT LONG-TERM CURRENT USE OF INSULIN: Primary | ICD-10-CM

## 2023-01-09 DIAGNOSIS — I15.2 HYPERTENSION ASSOCIATED WITH DIABETES: Chronic | ICD-10-CM

## 2023-01-09 PROCEDURE — 3008F BODY MASS INDEX DOCD: CPT | Mod: CPTII,S$GLB,, | Performed by: NURSE PRACTITIONER

## 2023-01-09 PROCEDURE — 3044F PR MOST RECENT HEMOGLOBIN A1C LEVEL <7.0%: ICD-10-PCS | Mod: CPTII,S$GLB,, | Performed by: NURSE PRACTITIONER

## 2023-01-09 PROCEDURE — 1159F MED LIST DOCD IN RCRD: CPT | Mod: CPTII,S$GLB,, | Performed by: NURSE PRACTITIONER

## 2023-01-09 PROCEDURE — 3061F PR NEG MICROALBUMINURIA RESULT DOCUMENTED/REVIEW: ICD-10-PCS | Mod: CPTII,S$GLB,, | Performed by: NURSE PRACTITIONER

## 2023-01-09 PROCEDURE — 3061F NEG MICROALBUMINURIA REV: CPT | Mod: CPTII,S$GLB,, | Performed by: NURSE PRACTITIONER

## 2023-01-09 PROCEDURE — 1126F PR PAIN SEVERITY QUANTIFIED, NO PAIN PRESENT: ICD-10-PCS | Mod: CPTII,S$GLB,, | Performed by: NURSE PRACTITIONER

## 2023-01-09 PROCEDURE — 3008F PR BODY MASS INDEX (BMI) DOCUMENTED: ICD-10-PCS | Mod: CPTII,S$GLB,, | Performed by: NURSE PRACTITIONER

## 2023-01-09 PROCEDURE — 99999 PR PBB SHADOW E&M-EST. PATIENT-LVL IV: ICD-10-PCS | Mod: PBBFAC,,, | Performed by: NURSE PRACTITIONER

## 2023-01-09 PROCEDURE — 99999 PR PBB SHADOW E&M-EST. PATIENT-LVL IV: CPT | Mod: PBBFAC,,, | Performed by: NURSE PRACTITIONER

## 2023-01-09 PROCEDURE — 3075F SYST BP GE 130 - 139MM HG: CPT | Mod: CPTII,S$GLB,, | Performed by: NURSE PRACTITIONER

## 2023-01-09 PROCEDURE — 3078F DIAST BP <80 MM HG: CPT | Mod: CPTII,S$GLB,, | Performed by: NURSE PRACTITIONER

## 2023-01-09 PROCEDURE — 3066F NEPHROPATHY DOC TX: CPT | Mod: CPTII,S$GLB,, | Performed by: NURSE PRACTITIONER

## 2023-01-09 PROCEDURE — 3078F PR MOST RECENT DIASTOLIC BLOOD PRESSURE < 80 MM HG: ICD-10-PCS | Mod: CPTII,S$GLB,, | Performed by: NURSE PRACTITIONER

## 2023-01-09 PROCEDURE — 99214 OFFICE O/P EST MOD 30 MIN: CPT | Mod: S$GLB,,, | Performed by: NURSE PRACTITIONER

## 2023-01-09 PROCEDURE — 3066F PR DOCUMENTATION OF TREATMENT FOR NEPHROPATHY: ICD-10-PCS | Mod: CPTII,S$GLB,, | Performed by: NURSE PRACTITIONER

## 2023-01-09 PROCEDURE — 3075F PR MOST RECENT SYSTOLIC BLOOD PRESS GE 130-139MM HG: ICD-10-PCS | Mod: CPTII,S$GLB,, | Performed by: NURSE PRACTITIONER

## 2023-01-09 PROCEDURE — 1159F PR MEDICATION LIST DOCUMENTED IN MEDICAL RECORD: ICD-10-PCS | Mod: CPTII,S$GLB,, | Performed by: NURSE PRACTITIONER

## 2023-01-09 PROCEDURE — 3288F PR FALLS RISK ASSESSMENT DOCUMENTED: ICD-10-PCS | Mod: CPTII,S$GLB,, | Performed by: NURSE PRACTITIONER

## 2023-01-09 PROCEDURE — 1101F PT FALLS ASSESS-DOCD LE1/YR: CPT | Mod: CPTII,S$GLB,, | Performed by: NURSE PRACTITIONER

## 2023-01-09 PROCEDURE — 1101F PR PT FALLS ASSESS DOC 0-1 FALLS W/OUT INJ PAST YR: ICD-10-PCS | Mod: CPTII,S$GLB,, | Performed by: NURSE PRACTITIONER

## 2023-01-09 PROCEDURE — 99214 PR OFFICE/OUTPT VISIT, EST, LEVL IV, 30-39 MIN: ICD-10-PCS | Mod: S$GLB,,, | Performed by: NURSE PRACTITIONER

## 2023-01-09 PROCEDURE — 3288F FALL RISK ASSESSMENT DOCD: CPT | Mod: CPTII,S$GLB,, | Performed by: NURSE PRACTITIONER

## 2023-01-09 PROCEDURE — 1126F AMNT PAIN NOTED NONE PRSNT: CPT | Mod: CPTII,S$GLB,, | Performed by: NURSE PRACTITIONER

## 2023-01-09 PROCEDURE — 3044F HG A1C LEVEL LT 7.0%: CPT | Mod: CPTII,S$GLB,, | Performed by: NURSE PRACTITIONER

## 2023-01-09 PROCEDURE — 3072F PR LOW RISK FOR RETINOPATHY: ICD-10-PCS | Mod: CPTII,S$GLB,, | Performed by: NURSE PRACTITIONER

## 2023-01-09 PROCEDURE — 3072F LOW RISK FOR RETINOPATHY: CPT | Mod: CPTII,S$GLB,, | Performed by: NURSE PRACTITIONER

## 2023-01-09 NOTE — PROGRESS NOTES
Chief Complaint  Chief Complaint   Patient presents with    Follow-up         HPI     HPI  May Montoya is a 67 y.o. female with medical diagnoses as listed in the medical history and problem list that presents for .DM Follow- up  This patient is new to me.    DM Follow- up: Needs refill on Ozempic. A1C is 5.5 a week ago. Reports Ozempic works for her. Loss a total of 50 lbs since starting Ozempic. She reports compliance with all medications. Reports resuming a healthy diet after the holidays. Pertinent negatives are chest pain/palpitations/tightness/discomfort, SOB, GI upset, urinary/bowel changes, unexplained weight loss/gain, dizziness/headaches/syncope.       History     PAST MEDICAL HISTORY:  Past Medical History:   Diagnosis Date    Arthritis     Diabetes mellitus     Herniated disc, cervical     Hypertension     Type 2 diabetes mellitus with diabetic polyneuropathy, without long-term current use of insulin 3/29/2021       PAST SURGICAL HISTORY:  Past Surgical History:   Procedure Laterality Date    BREAST BIOPSY       SECTION      CHOLECYSTECTOMY      DILATION AND CURETTAGE OF UTERUS      GALLBLADDER SURGERY      HYSTEROSCOPY      NECK SURGERY  2020    peripheral artery angioplasty      TOTAL KNEE ARTHROPLASTY      L       SOCIAL HISTORY:  Social History     Socioeconomic History    Marital status:    Tobacco Use    Smoking status: Never    Smokeless tobacco: Never   Substance and Sexual Activity    Alcohol use: No    Drug use: No    Sexual activity: Never       FAMILY HISTORY:  Family History   Problem Relation Age of Onset    Diabetes Mother        ALLERGIES AND MEDICATIONS: updated and reviewed.  Review of patient's allergies indicates:   Allergen Reactions    Avelox [moxifloxacin]      Current Outpatient Medications   Medication Sig Dispense Refill    amlodipine-benazepril 2.5-10 mg (LOTREL) 2.5-10 mg per capsule Take 1 capsule by mouth once daily. 90 capsule 2    aspirin (ECOTRIN)  81 MG EC tablet Take 81 mg by mouth once daily.      atorvastatin (LIPITOR) 40 MG tablet Take 1 tablet (40 mg total) by mouth every evening. 90 tablet 2    blood sugar diagnostic Strp Check blood glucose 1-2 times daily (BEFORE breakfast or other meal) and as needed for symptoms of low or high blood glucose 200 each 11    blood-glucose meter kit Check blood glucose 1-2 times daily (BEFORE breakfast or other meal) and as needed for symptoms of low or high blood glucose 1 each 0    celecoxib (CELEBREX) 200 MG capsule Take 1 capsule by mouth 2 times daily. 180 capsule 1    cetirizine (ZYRTEC) 5 MG tablet Take 5 mg by mouth once daily.      cholecalciferol, vitamin D3, (VITAMIN D3) 50 mcg (2,000 unit) Cap Take 1 capsule by mouth once daily.      folic acid (FOLVITE) 1 MG tablet Take 1 tablet (1 mg total) by mouth once daily. 90 tablet 2    GLUCOSAMINE/D3/BOSWELLIA MAYELIN (OSTEO BI-FLEX, 5-LOXIN, ORAL) Take 2 tablets by mouth once daily.      lancets Misc Check blood glucose 1-2 times daily (BEFORE breakfast or other meal) and as needed for symptoms of low or high blood glucose 200 each 11    metaxalone (SKELAXIN) 800 MG tablet TAKE 1 TABLET TWICE DAILY AS NEEDED FOR SPASMS  1    metFORMIN (GLUCOPHAGE) 500 MG tablet Take 2 tablets (1,000 mg total) by mouth 2 (two) times daily with meals. 360 tablet 2    methotrexate 2.5 MG Tab Take 4 tablets (10 mg total) by mouth every 7 days. 16 tablet 3    multivit-mineral-iron-lutein Tab Take 1 tablet by mouth once daily.      semaglutide (OZEMPIC) 0.25 mg or 0.5 mg(2 mg/1.5 mL) pen injector Inject 0.5 mg into the skin every 7 days. for 365 doses 1 pen 5     No current facility-administered medications for this visit.           Exam     ROS  Review of Systems   Constitutional:  Negative for appetite change, chills, fatigue and fever.   HENT:  Negative for congestion, ear pain, postnasal drip, rhinorrhea, sinus pressure, sneezing and sore throat.    Respiratory:  Negative for shortness  "of breath.    Cardiovascular:  Negative for chest pain and palpitations.   Gastrointestinal:  Negative for abdominal pain, constipation, diarrhea, nausea and vomiting.   Genitourinary:  Negative for dysuria.   Musculoskeletal:  Negative for arthralgias.   Neurological:  Negative for headaches.   Psychiatric/Behavioral:  Negative for sleep disturbance.          Physical Exam  Vitals:    01/09/23 0937   BP: 134/72   BP Location: Left arm   Patient Position: Sitting   BP Method: Large (Manual)   Pulse: 78   Resp: 16   Temp: 97.2 °F (36.2 °C)   TempSrc: Tympanic   SpO2: 97%   Weight: 79.7 kg (175 lb 11.3 oz)   Height: 5' 3" (1.6 m)    Body mass index is 31.13 kg/m².  Weight: 79.7 kg (175 lb 11.3 oz)   Height: 5' 3" (160 cm)   Physical Exam  Constitutional:       General: She is not in acute distress.     Appearance: Normal appearance.   HENT:      Head: Normocephalic and atraumatic.      Right Ear: External ear normal.      Left Ear: External ear normal.      Nose: Nose normal.   Eyes:      Pupils: Pupils are equal, round, and reactive to light.   Cardiovascular:      Rate and Rhythm: Normal rate and regular rhythm.      Pulses: Normal pulses.   Pulmonary:      Effort: Pulmonary effort is normal. No respiratory distress.      Breath sounds: Normal breath sounds. No wheezing.   Abdominal:      General: Bowel sounds are normal.      Palpations: Abdomen is soft.   Musculoskeletal:      Cervical back: Neck supple.   Lymphadenopathy:      Cervical: No cervical adenopathy.   Skin:     General: Skin is warm and dry.   Neurological:      Mental Status: She is alert and oriented to person, place, and time.   Psychiatric:         Mood and Affect: Mood normal.           Health Maintenance         Date Due Completion Date    Foot Exam 02/02/2023 2/2/2022 (Done)    Override on 2/2/2022: Done    Override on 4/12/2021: Done    Override on 3/29/2021: Done    Override on 9/30/2020: Done    Override on 4/1/2019: Done    Mammogram " 04/05/2023 4/5/2022    Override on 9/1/2020: Done (Dr. Gwen Krishnan ([facility]) - Records Requested)    Override on 4/18/2019: Done    Hemoglobin A1c 07/03/2023 1/3/2023    Override on 8/7/2017: Done    Eye Exam 08/22/2023 8/22/2022    Lipid Panel 09/14/2023 9/14/2022    Override on 8/7/2017: Done    Low Dose Statin 09/19/2023 9/19/2022    Override on 3/12/2018: Declined    Diabetes Urine Screening 01/03/2024 1/3/2023    Colorectal Cancer Screening 04/13/2024 4/13/2021    DEXA Scan 04/05/2025 4/5/2022    Override on 6/4/2018: Done    TETANUS VACCINE 03/27/2028 3/27/2018              Assessment & Plan     Assessment & Plan  Problem List Items Addressed This Visit          Cardiac/Vascular    Hypertension associated with diabetes (Chronic)  -Continue Hypertensive agents     Relevant Medications    semaglutide (OZEMPIC) 0.25 mg or 0.5 mg(2 mg/1.5 mL) pen injector    Hyperlipidemia associated with type 2 diabetes mellitus (Chronic)  -Continue statin therapy     Relevant Medications    semaglutide (OZEMPIC) 0.25 mg or 0.5 mg(2 mg/1.5 mL) pen injector       Immunology/Multi System    Seronegative rheumatoid arthritis  -The current medical regimen is effective;  continue present plan and medications    Overview     Rheumatologist: Dr. Domingo Gonzalez             Endocrine    Type 2 diabetes mellitus, without long-term current use of insulin - Primary  -The current medical regimen is effective;  continue present plan and medications.     Relevant Medications    semaglutide (OZEMPIC) 0.25 mg or 0.5 mg(2 mg/1.5 mL) pen injector         Health Maintenance reviewed: Deferred    Follow-up: 6 months for DM follow-up    30+ minutes of total time spent on the encounter, which includes face to face time and non-face to face time preparing to see the patient (eg, review of tests), Obtaining and/or reviewing separately obtained history, documenting clinical information in the electronic or other health record, independently  interpreting results (not separately reported) and communicating results to the patient/family/caregiver, or Care coordination (not separately reported).

## 2023-01-24 ENCOUNTER — TELEPHONE (OUTPATIENT)
Dept: INTERNAL MEDICINE | Facility: CLINIC | Age: 68
End: 2023-01-24
Payer: MEDICARE

## 2023-01-24 DIAGNOSIS — E78.5 HYPERLIPIDEMIA ASSOCIATED WITH TYPE 2 DIABETES MELLITUS: Chronic | ICD-10-CM

## 2023-01-24 DIAGNOSIS — E11.69 HYPERLIPIDEMIA ASSOCIATED WITH TYPE 2 DIABETES MELLITUS: Chronic | ICD-10-CM

## 2023-01-24 RX ORDER — ATORVASTATIN CALCIUM 40 MG/1
40 TABLET, FILM COATED ORAL NIGHTLY
Qty: 90 TABLET | Refills: 2 | Status: SHIPPED | OUTPATIENT
Start: 2023-01-24 | End: 2023-04-25 | Stop reason: SDUPTHER

## 2023-01-24 NOTE — TELEPHONE ENCOUNTER
I spoke with patient regarding Lipitor refill.  2.  I explained I sent the medication electronically     Norris Pugh NP

## 2023-01-24 NOTE — TELEPHONE ENCOUNTER
----- Message from Britt Elkins MA sent at 1/24/2023 11:02 AM CST -----  Contact: May    ----- Message -----  From: Yvette Guzman  Sent: 1/24/2023  10:03 AM CST  To: [facility] Staff    Type:  RX Refill Request    Who Called: May  Refill or New Rx:refill  RX Name and Strength:atorvastatin (LIPITOR) 40 MG tablet  How is the patient currently taking it? (ex. 1XDay):as prescribed  Is this a 30 day or 90 day RX:30 day  Preferred Pharmacy with phone number:  [facility] - [place], [place] - [id number]  [facility] [address]  Phone: [phone] Fax: [phone]  Local or Mail Order:local  Ordering Provider:Domingo Shields  Would the patient rather a call back or a response via [facility]? Call back   Best Call Back Number:[phone]  Additional Information: The pt has multiple prescriptions that were to be sent, but the pharmacy doesn't have anything.       Thanks  TS

## 2023-03-13 ENCOUNTER — LAB VISIT (OUTPATIENT)
Dept: LAB | Facility: HOSPITAL | Age: 68
End: 2023-03-13
Attending: INTERNAL MEDICINE
Payer: MEDICARE

## 2023-03-13 DIAGNOSIS — D84.9 IMMUNOCOMPROMISED: ICD-10-CM

## 2023-03-13 DIAGNOSIS — M06.00 SERONEGATIVE RHEUMATOID ARTHRITIS: ICD-10-CM

## 2023-03-13 DIAGNOSIS — Z79.899 LONG TERM CURRENT USE OF IMMUNOSUPPRESSIVE DRUG: ICD-10-CM

## 2023-03-13 LAB
ALBUMIN SERPL BCP-MCNC: 3.5 G/DL (ref 3.5–5.2)
ALP SERPL-CCNC: 49 U/L (ref 55–135)
ALT SERPL W/O P-5'-P-CCNC: 15 U/L (ref 10–44)
ANION GAP SERPL CALC-SCNC: 10 MMOL/L (ref 8–16)
AST SERPL-CCNC: 13 U/L (ref 10–40)
BASOPHILS # BLD AUTO: 0.04 K/UL (ref 0–0.2)
BASOPHILS NFR BLD: 0.6 % (ref 0–1.9)
BILIRUB SERPL-MCNC: 0.4 MG/DL (ref 0.1–1)
BUN SERPL-MCNC: 10 MG/DL (ref 8–23)
CALCIUM SERPL-MCNC: 9.1 MG/DL (ref 8.7–10.5)
CHLORIDE SERPL-SCNC: 104 MMOL/L (ref 95–110)
CO2 SERPL-SCNC: 27 MMOL/L (ref 23–29)
CREAT SERPL-MCNC: 0.6 MG/DL (ref 0.5–1.4)
CRP SERPL-MCNC: 1.8 MG/L (ref 0–8.2)
DIFFERENTIAL METHOD: ABNORMAL
EOSINOPHIL # BLD AUTO: 0.2 K/UL (ref 0–0.5)
EOSINOPHIL NFR BLD: 3.4 % (ref 0–8)
ERYTHROCYTE [DISTWIDTH] IN BLOOD BY AUTOMATED COUNT: 12.2 % (ref 11.5–14.5)
ERYTHROCYTE [SEDIMENTATION RATE] IN BLOOD BY WESTERGREN METHOD: 10 MM/HR (ref 0–20)
EST. GFR  (NO RACE VARIABLE): >60 ML/MIN/1.73 M^2
GLUCOSE SERPL-MCNC: 106 MG/DL (ref 70–110)
HCT VFR BLD AUTO: 39.7 % (ref 37–48.5)
HGB BLD-MCNC: 13.2 G/DL (ref 12–16)
IMM GRANULOCYTES # BLD AUTO: 0.09 K/UL (ref 0–0.04)
IMM GRANULOCYTES NFR BLD AUTO: 1.3 % (ref 0–0.5)
LYMPHOCYTES # BLD AUTO: 1.8 K/UL (ref 1–4.8)
LYMPHOCYTES NFR BLD: 25 % (ref 18–48)
MCH RBC QN AUTO: 31.2 PG (ref 27–31)
MCHC RBC AUTO-ENTMCNC: 33.2 G/DL (ref 32–36)
MCV RBC AUTO: 94 FL (ref 82–98)
MONOCYTES # BLD AUTO: 0.7 K/UL (ref 0.3–1)
MONOCYTES NFR BLD: 9.2 % (ref 4–15)
NEUTROPHILS # BLD AUTO: 4.3 K/UL (ref 1.8–7.7)
NEUTROPHILS NFR BLD: 60.5 % (ref 38–73)
NRBC BLD-RTO: 0 /100 WBC
PLATELET # BLD AUTO: 329 K/UL (ref 150–450)
PMV BLD AUTO: 9.4 FL (ref 9.2–12.9)
POTASSIUM SERPL-SCNC: 4.7 MMOL/L (ref 3.5–5.1)
PROT SERPL-MCNC: 6.6 G/DL (ref 6–8.4)
RBC # BLD AUTO: 4.23 M/UL (ref 4–5.4)
SODIUM SERPL-SCNC: 141 MMOL/L (ref 136–145)
WBC # BLD AUTO: 7.05 K/UL (ref 3.9–12.7)

## 2023-03-13 PROCEDURE — 85025 COMPLETE CBC W/AUTO DIFF WBC: CPT | Performed by: INTERNAL MEDICINE

## 2023-03-13 PROCEDURE — 85651 RBC SED RATE NONAUTOMATED: CPT | Performed by: INTERNAL MEDICINE

## 2023-03-13 PROCEDURE — 80053 COMPREHEN METABOLIC PANEL: CPT | Performed by: INTERNAL MEDICINE

## 2023-03-13 PROCEDURE — 36415 COLL VENOUS BLD VENIPUNCTURE: CPT | Mod: PO | Performed by: INTERNAL MEDICINE

## 2023-03-13 PROCEDURE — 86140 C-REACTIVE PROTEIN: CPT | Performed by: INTERNAL MEDICINE

## 2023-03-14 ENCOUNTER — OFFICE VISIT (OUTPATIENT)
Dept: INTERNAL MEDICINE | Facility: CLINIC | Age: 68
End: 2023-03-14
Payer: MEDICARE

## 2023-03-14 VITALS
HEART RATE: 81 BPM | OXYGEN SATURATION: 96 % | DIASTOLIC BLOOD PRESSURE: 64 MMHG | HEIGHT: 63 IN | WEIGHT: 174.38 LBS | TEMPERATURE: 97 F | SYSTOLIC BLOOD PRESSURE: 118 MMHG | BODY MASS INDEX: 30.9 KG/M2

## 2023-03-14 DIAGNOSIS — Z12.31 ENCOUNTER FOR SCREENING MAMMOGRAM FOR MALIGNANT NEOPLASM OF BREAST: ICD-10-CM

## 2023-03-14 DIAGNOSIS — E78.5 HYPERLIPIDEMIA ASSOCIATED WITH TYPE 2 DIABETES MELLITUS: Chronic | ICD-10-CM

## 2023-03-14 DIAGNOSIS — I73.9 PERIPHERAL VASCULAR DISEASE, UNSPECIFIED: ICD-10-CM

## 2023-03-14 DIAGNOSIS — D84.9 IMMUNOCOMPROMISED: ICD-10-CM

## 2023-03-14 DIAGNOSIS — E11.9 TYPE 2 DIABETES MELLITUS WITHOUT COMPLICATION, WITHOUT LONG-TERM CURRENT USE OF INSULIN: Primary | ICD-10-CM

## 2023-03-14 DIAGNOSIS — E11.59 HYPERTENSION ASSOCIATED WITH DIABETES: Chronic | ICD-10-CM

## 2023-03-14 DIAGNOSIS — L84 CORN OF FOOT: ICD-10-CM

## 2023-03-14 DIAGNOSIS — E11.69 HYPERLIPIDEMIA ASSOCIATED WITH TYPE 2 DIABETES MELLITUS: Chronic | ICD-10-CM

## 2023-03-14 DIAGNOSIS — I15.2 HYPERTENSION ASSOCIATED WITH DIABETES: Chronic | ICD-10-CM

## 2023-03-14 PROCEDURE — 3008F PR BODY MASS INDEX (BMI) DOCUMENTED: ICD-10-PCS | Mod: CPTII,S$GLB,, | Performed by: PHYSICIAN ASSISTANT

## 2023-03-14 PROCEDURE — 1160F PR REVIEW ALL MEDS BY PRESCRIBER/CLIN PHARMACIST DOCUMENTED: ICD-10-PCS | Mod: CPTII,S$GLB,, | Performed by: PHYSICIAN ASSISTANT

## 2023-03-14 PROCEDURE — 3044F HG A1C LEVEL LT 7.0%: CPT | Mod: CPTII,S$GLB,, | Performed by: PHYSICIAN ASSISTANT

## 2023-03-14 PROCEDURE — 3074F PR MOST RECENT SYSTOLIC BLOOD PRESSURE < 130 MM HG: ICD-10-PCS | Mod: CPTII,S$GLB,, | Performed by: PHYSICIAN ASSISTANT

## 2023-03-14 PROCEDURE — 3066F PR DOCUMENTATION OF TREATMENT FOR NEPHROPATHY: ICD-10-PCS | Mod: CPTII,S$GLB,, | Performed by: PHYSICIAN ASSISTANT

## 2023-03-14 PROCEDURE — 3288F PR FALLS RISK ASSESSMENT DOCUMENTED: ICD-10-PCS | Mod: CPTII,S$GLB,, | Performed by: PHYSICIAN ASSISTANT

## 2023-03-14 PROCEDURE — 3288F FALL RISK ASSESSMENT DOCD: CPT | Mod: CPTII,S$GLB,, | Performed by: PHYSICIAN ASSISTANT

## 2023-03-14 PROCEDURE — 99204 OFFICE O/P NEW MOD 45 MIN: CPT | Mod: S$GLB,,, | Performed by: PHYSICIAN ASSISTANT

## 2023-03-14 PROCEDURE — 1159F MED LIST DOCD IN RCRD: CPT | Mod: CPTII,S$GLB,, | Performed by: PHYSICIAN ASSISTANT

## 2023-03-14 PROCEDURE — 3044F PR MOST RECENT HEMOGLOBIN A1C LEVEL <7.0%: ICD-10-PCS | Mod: CPTII,S$GLB,, | Performed by: PHYSICIAN ASSISTANT

## 2023-03-14 PROCEDURE — 3074F SYST BP LT 130 MM HG: CPT | Mod: CPTII,S$GLB,, | Performed by: PHYSICIAN ASSISTANT

## 2023-03-14 PROCEDURE — 3008F BODY MASS INDEX DOCD: CPT | Mod: CPTII,S$GLB,, | Performed by: PHYSICIAN ASSISTANT

## 2023-03-14 PROCEDURE — 1126F PR PAIN SEVERITY QUANTIFIED, NO PAIN PRESENT: ICD-10-PCS | Mod: CPTII,S$GLB,, | Performed by: PHYSICIAN ASSISTANT

## 2023-03-14 PROCEDURE — 99204 PR OFFICE/OUTPT VISIT, NEW, LEVL IV, 45-59 MIN: ICD-10-PCS | Mod: S$GLB,,, | Performed by: PHYSICIAN ASSISTANT

## 2023-03-14 PROCEDURE — 3066F NEPHROPATHY DOC TX: CPT | Mod: CPTII,S$GLB,, | Performed by: PHYSICIAN ASSISTANT

## 2023-03-14 PROCEDURE — 3061F PR NEG MICROALBUMINURIA RESULT DOCUMENTED/REVIEW: ICD-10-PCS | Mod: CPTII,S$GLB,, | Performed by: PHYSICIAN ASSISTANT

## 2023-03-14 PROCEDURE — 99999 PR PBB SHADOW E&M-EST. PATIENT-LVL IV: CPT | Mod: PBBFAC,,, | Performed by: PHYSICIAN ASSISTANT

## 2023-03-14 PROCEDURE — 99999 PR PBB SHADOW E&M-EST. PATIENT-LVL IV: ICD-10-PCS | Mod: PBBFAC,,, | Performed by: PHYSICIAN ASSISTANT

## 2023-03-14 PROCEDURE — 3078F DIAST BP <80 MM HG: CPT | Mod: CPTII,S$GLB,, | Performed by: PHYSICIAN ASSISTANT

## 2023-03-14 PROCEDURE — 1101F PT FALLS ASSESS-DOCD LE1/YR: CPT | Mod: CPTII,S$GLB,, | Performed by: PHYSICIAN ASSISTANT

## 2023-03-14 PROCEDURE — 3078F PR MOST RECENT DIASTOLIC BLOOD PRESSURE < 80 MM HG: ICD-10-PCS | Mod: CPTII,S$GLB,, | Performed by: PHYSICIAN ASSISTANT

## 2023-03-14 PROCEDURE — 1159F PR MEDICATION LIST DOCUMENTED IN MEDICAL RECORD: ICD-10-PCS | Mod: CPTII,S$GLB,, | Performed by: PHYSICIAN ASSISTANT

## 2023-03-14 PROCEDURE — 4010F PR ACE/ARB THEARPY RXD/TAKEN: ICD-10-PCS | Mod: CPTII,S$GLB,, | Performed by: PHYSICIAN ASSISTANT

## 2023-03-14 PROCEDURE — 3072F PR LOW RISK FOR RETINOPATHY: ICD-10-PCS | Mod: CPTII,S$GLB,, | Performed by: PHYSICIAN ASSISTANT

## 2023-03-14 PROCEDURE — 1126F AMNT PAIN NOTED NONE PRSNT: CPT | Mod: CPTII,S$GLB,, | Performed by: PHYSICIAN ASSISTANT

## 2023-03-14 PROCEDURE — 3061F NEG MICROALBUMINURIA REV: CPT | Mod: CPTII,S$GLB,, | Performed by: PHYSICIAN ASSISTANT

## 2023-03-14 PROCEDURE — 1160F RVW MEDS BY RX/DR IN RCRD: CPT | Mod: CPTII,S$GLB,, | Performed by: PHYSICIAN ASSISTANT

## 2023-03-14 PROCEDURE — 3072F LOW RISK FOR RETINOPATHY: CPT | Mod: CPTII,S$GLB,, | Performed by: PHYSICIAN ASSISTANT

## 2023-03-14 PROCEDURE — 1101F PR PT FALLS ASSESS DOC 0-1 FALLS W/OUT INJ PAST YR: ICD-10-PCS | Mod: CPTII,S$GLB,, | Performed by: PHYSICIAN ASSISTANT

## 2023-03-14 PROCEDURE — 4010F ACE/ARB THERAPY RXD/TAKEN: CPT | Mod: CPTII,S$GLB,, | Performed by: PHYSICIAN ASSISTANT

## 2023-03-14 RX ORDER — SEMAGLUTIDE 1.34 MG/ML
1 INJECTION, SOLUTION SUBCUTANEOUS
Qty: 9 ML | Refills: 3 | Status: SHIPPED | OUTPATIENT
Start: 2023-03-14 | End: 2023-04-25 | Stop reason: SDUPTHER

## 2023-03-14 RX ORDER — CEFUROXIME AXETIL 250 MG/1
TABLET ORAL
COMMUNITY
Start: 2023-03-06 | End: 2023-04-25

## 2023-03-14 NOTE — PROGRESS NOTES
Subjective:      Patient ID: May Montoya is a 67 y.o. female.    Chief Complaint: No chief complaint on file.    HPI  Ms. Montoya is here today for her 6 month follow up for her medical problems.   Up to date with rheumatology.   Corn on pinky toe of her right foot that has been there for years, comes and goes. See's podiatry. Pt states that she will schedule follow up when she has her calender.   BP stable and controlled on lotrel.  Diabetes is doing great on metformin and ozempic 0.5mg. Doing great on current dose without any SE.     Wt Readings from Last 3 Encounters:   03/14/23 0909 79.1 kg (174 lb 6.1 oz)   01/09/23 0937 79.7 kg (175 lb 11.3 oz)   09/19/22 1305 77.2 kg (170 lb 3.1 oz)      Patient Active Problem List   Diagnosis    Hypertension associated with diabetes    Hyperlipidemia associated with type 2 diabetes mellitus    Peripheral vascular disease, unspecified    Cervical stenosis of spine    Type 2 diabetes mellitus, without long-term current use of insulin    Chronic venous hypertension involving legs, L > R    Generalized osteoarthritis of multiple sites    Primary osteoarthritis of both hands    Weight loss    Seronegative rheumatoid arthritis    Long term current use of immunosuppressive drug    Immunocompromised    History of COVID-19 (December, 2021)         Current Outpatient Medications:     amlodipine-benazepril 2.5-10 mg (LOTREL) 2.5-10 mg per capsule, Take 1 capsule by mouth once daily., Disp: 90 capsule, Rfl: 2    aspirin (ECOTRIN) 81 MG EC tablet, Take 81 mg by mouth once daily., Disp: , Rfl:     atorvastatin (LIPITOR) 40 MG tablet, Take 1 tablet (40 mg total) by mouth every evening., Disp: 90 tablet, Rfl: 2    blood sugar diagnostic Strp, Check blood glucose 1-2 times daily (BEFORE breakfast or other meal) and as needed for symptoms of low or high blood glucose, Disp: 200 each, Rfl: 11    blood-glucose meter kit, Check blood glucose 1-2 times daily (BEFORE breakfast or other meal)  and as needed for symptoms of low or high blood glucose, Disp: 1 each, Rfl: 0    cefUROXime (CEFTIN) 250 MG tablet, take 1 tablet (250 mg) by oral route 2 times per day for 10 days, Disp: , Rfl:     celecoxib (CELEBREX) 200 MG capsule, Take 1 capsule by mouth 2 times daily., Disp: 180 capsule, Rfl: 1    cetirizine (ZYRTEC) 5 MG tablet, Take 5 mg by mouth once daily., Disp: , Rfl:     cholecalciferol, vitamin D3, (VITAMIN D3) 50 mcg (2,000 unit) Cap, Take 1 capsule by mouth once daily., Disp: , Rfl:     folic acid (FOLVITE) 1 MG tablet, Take 1 tablet (1 mg total) by mouth once daily., Disp: 90 tablet, Rfl: 2    GLUCOSAMINE/D3/BOSWELLIA MAYELIN (OSTEO BI-FLEX, 5-LOXIN, ORAL), Take 2 tablets by mouth once daily., Disp: , Rfl:     lancets Misc, Check blood glucose 1-2 times daily (BEFORE breakfast or other meal) and as needed for symptoms of low or high blood glucose, Disp: 200 each, Rfl: 11    metFORMIN (GLUCOPHAGE) 500 MG tablet, Take 2 tablets (1,000 mg total) by mouth 2 (two) times daily with meals., Disp: 360 tablet, Rfl: 2    methotrexate 2.5 MG Tab, Take 4 tablets (10 mg total) by mouth every 7 days., Disp: 16 tablet, Rfl: 3    metaxalone (SKELAXIN) 800 MG tablet, TAKE 1 TABLET TWICE DAILY AS NEEDED FOR SPASMS, Disp: , Rfl: 1    multivit-mineral-iron-lutein Tab, Take 1 tablet by mouth once daily., Disp: , Rfl:     semaglutide (OZEMPIC) 1 mg/dose (4 mg/3 mL), Inject 1 mg into the skin every 7 days., Disp: 9 mL, Rfl: 3    Review of Systems   Constitutional:  Negative for activity change, appetite change, chills, diaphoresis, fatigue, fever and unexpected weight change.   HENT: Negative.  Negative for congestion, hearing loss, postnasal drip, rhinorrhea, sore throat, trouble swallowing and voice change.    Eyes: Negative.  Negative for visual disturbance.   Respiratory: Negative.  Negative for cough, choking, chest tightness and shortness of breath.    Cardiovascular:  Negative for chest pain, palpitations and leg  "swelling.   Gastrointestinal:  Negative for abdominal distention, abdominal pain, blood in stool, constipation, diarrhea, nausea and vomiting.   Endocrine: Negative for cold intolerance, heat intolerance, polydipsia and polyuria.   Genitourinary: Negative.  Negative for difficulty urinating and frequency.   Musculoskeletal:  Negative for arthralgias, back pain, gait problem, joint swelling and myalgias.   Skin:  Negative for color change, pallor, rash and wound.   Neurological:  Negative for dizziness, tremors, weakness, light-headedness, numbness and headaches.   Hematological:  Negative for adenopathy.   Psychiatric/Behavioral:  Negative for behavioral problems, confusion, self-injury, sleep disturbance and suicidal ideas. The patient is not nervous/anxious.    Objective:   /64 (BP Location: Left arm, Patient Position: Sitting, BP Method: Large (Manual))   Pulse 81   Temp 97.4 °F (36.3 °C) (Tympanic)   Ht 5' 3" (1.6 m)   Wt 79.1 kg (174 lb 6.1 oz)   LMP  (LMP Unknown)   SpO2 96%   BMI 30.89 kg/m²     Physical Exam  Vitals reviewed.   Constitutional:       General: She is not in acute distress.     Appearance: Normal appearance. She is well-developed. She is not ill-appearing, toxic-appearing or diaphoretic.   HENT:      Head: Normocephalic and atraumatic.      Right Ear: External ear normal.      Left Ear: External ear normal.      Nose: Nose normal.   Eyes:      Conjunctiva/sclera: Conjunctivae normal.      Pupils: Pupils are equal, round, and reactive to light.   Cardiovascular:      Rate and Rhythm: Normal rate and regular rhythm.      Pulses:           Dorsalis pedis pulses are 2+ on the right side and 2+ on the left side.        Posterior tibial pulses are 2+ on the right side and 2+ on the left side.      Heart sounds: Normal heart sounds. No murmur heard.    No friction rub. No gallop.   Pulmonary:      Effort: Pulmonary effort is normal. No respiratory distress.      Breath sounds: Normal " breath sounds. No wheezing or rales.   Chest:      Chest wall: No tenderness.   Abdominal:      General: There is no distension.      Palpations: Abdomen is soft.      Tenderness: There is no abdominal tenderness.   Musculoskeletal:         General: Normal range of motion.      Cervical back: Normal range of motion and neck supple.      Right foot: Normal range of motion.      Left foot: Normal range of motion.        Feet:    Feet:      Right foot:      Protective Sensation: 10 sites tested.  9 sites sensed.      Skin integrity: Callus present.      Toenail Condition: Right toenails are normal.      Left foot:      Protective Sensation: 10 sites tested.  10 sites sensed.      Skin integrity: Skin integrity normal.      Toenail Condition: Left toenails are normal.   Lymphadenopathy:      Cervical: No cervical adenopathy.   Skin:     General: Skin is warm and dry.      Capillary Refill: Capillary refill takes less than 2 seconds.      Findings: No rash.   Neurological:      Mental Status: She is alert and oriented to person, place, and time.      Motor: No weakness.      Coordination: Coordination normal.      Gait: Gait normal.   Psychiatric:         Mood and Affect: Mood normal.         Behavior: Behavior normal.         Thought Content: Thought content normal.         Judgment: Judgment normal.     Lab Visit on 03/13/2023   Component Date Value Ref Range Status    WBC 03/13/2023 7.05  3.90 - 12.70 K/uL Final    RBC 03/13/2023 4.23  4.00 - 5.40 M/uL Final    Hemoglobin 03/13/2023 13.2  12.0 - 16.0 g/dL Final    Hematocrit 03/13/2023 39.7  37.0 - 48.5 % Final    MCV 03/13/2023 94  82 - 98 fL Final    MCH 03/13/2023 31.2 (H)  27.0 - 31.0 pg Final    MCHC 03/13/2023 33.2  32.0 - 36.0 g/dL Final    RDW 03/13/2023 12.2  11.5 - 14.5 % Final    Platelets 03/13/2023 329  150 - 450 K/uL Final    MPV 03/13/2023 9.4  9.2 - 12.9 fL Final    Immature Granulocytes 03/13/2023 1.3 (H)  0.0 - 0.5 % Final    Gran # (ANC) 03/13/2023  4.3  1.8 - 7.7 K/uL Final    Immature Grans (Abs) 03/13/2023 0.09 (H)  0.00 - 0.04 K/uL Final    Comment: Mild elevation in immature granulocytes is non specific and   can be seen in a variety of conditions including stress response,   acute inflammation, trauma and pregnancy. Correlation with other   laboratory and clinical findings is essential.      Lymph # 03/13/2023 1.8  1.0 - 4.8 K/uL Final    Mono # 03/13/2023 0.7  0.3 - 1.0 K/uL Final    Eos # 03/13/2023 0.2  0.0 - 0.5 K/uL Final    Baso # 03/13/2023 0.04  0.00 - 0.20 K/uL Final    nRBC 03/13/2023 0  0 /100 WBC Final    Gran % 03/13/2023 60.5  38.0 - 73.0 % Final    Lymph % 03/13/2023 25.0  18.0 - 48.0 % Final    Mono % 03/13/2023 9.2  4.0 - 15.0 % Final    Eosinophil % 03/13/2023 3.4  0.0 - 8.0 % Final    Basophil % 03/13/2023 0.6  0.0 - 1.9 % Final    Differential Method 03/13/2023 Automated   Final    Sodium 03/13/2023 141  136 - 145 mmol/L Final    Potassium 03/13/2023 4.7  3.5 - 5.1 mmol/L Final    Chloride 03/13/2023 104  95 - 110 mmol/L Final    CO2 03/13/2023 27  23 - 29 mmol/L Final    Glucose 03/13/2023 106  70 - 110 mg/dL Final    BUN 03/13/2023 10  8 - 23 mg/dL Final    Creatinine 03/13/2023 0.6  0.5 - 1.4 mg/dL Final    Calcium 03/13/2023 9.1  8.7 - 10.5 mg/dL Final    Total Protein 03/13/2023 6.6  6.0 - 8.4 g/dL Final    Albumin 03/13/2023 3.5  3.5 - 5.2 g/dL Final    Total Bilirubin 03/13/2023 0.4  0.1 - 1.0 mg/dL Final    Comment: For infants and newborns, interpretation of results should be based  on gestational age, weight and in agreement with clinical  observations.    Premature Infant recommended reference ranges:  Up to 24 hours.............<8.0 mg/dL  Up to 48 hours............<12.0 mg/dL  3-5 days..................<15.0 mg/dL  6-29 days.................<15.0 mg/dL      Alkaline Phosphatase 03/13/2023 49 (L)  55 - 135 U/L Final    AST 03/13/2023 13  10 - 40 U/L Final    ALT 03/13/2023 15  10 - 44 U/L Final    Anion Gap 03/13/2023 10  8  - 16 mmol/L Final    eGFR 03/13/2023 >60  >60 mL/min/1.73 m^2 Final    CRP 03/13/2023 1.8  0.0 - 8.2 mg/L Final    Sed Rate 03/13/2023 10  0 - 20 mm/Hr Final   Lab Visit on 01/03/2023   Component Date Value Ref Range Status    WBC 01/03/2023 6.32  3.90 - 12.70 K/uL Final    RBC 01/03/2023 4.09  4.00 - 5.40 M/uL Final    Hemoglobin 01/03/2023 12.8  12.0 - 16.0 g/dL Final    Hematocrit 01/03/2023 38.6  37.0 - 48.5 % Final    MCV 01/03/2023 94  82 - 98 fL Final    MCH 01/03/2023 31.3 (H)  27.0 - 31.0 pg Final    MCHC 01/03/2023 33.2  32.0 - 36.0 g/dL Final    RDW 01/03/2023 12.5  11.5 - 14.5 % Final    Platelets 01/03/2023 282  150 - 450 K/uL Final    MPV 01/03/2023 10.1  9.2 - 12.9 fL Final    Immature Granulocytes 01/03/2023 0.3  0.0 - 0.5 % Final    Gran # (ANC) 01/03/2023 3.8  1.8 - 7.7 K/uL Final    Immature Grans (Abs) 01/03/2023 0.02  0.00 - 0.04 K/uL Final    Comment: Mild elevation in immature granulocytes is non specific and   can be seen in a variety of conditions including stress response,   acute inflammation, trauma and pregnancy. Correlation with other   laboratory and clinical findings is essential.      Lymph # 01/03/2023 1.8  1.0 - 4.8 K/uL Final    Mono # 01/03/2023 0.5  0.3 - 1.0 K/uL Final    Eos # 01/03/2023 0.3  0.0 - 0.5 K/uL Final    Baso # 01/03/2023 0.04  0.00 - 0.20 K/uL Final    nRBC 01/03/2023 0  0 /100 WBC Final    Gran % 01/03/2023 59.4  38.0 - 73.0 % Final    Lymph % 01/03/2023 28.3  18.0 - 48.0 % Final    Mono % 01/03/2023 7.1  4.0 - 15.0 % Final    Eosinophil % 01/03/2023 4.3  0.0 - 8.0 % Final    Basophil % 01/03/2023 0.6  0.0 - 1.9 % Final    Differential Method 01/03/2023 Automated   Final    Sodium 01/03/2023 141  136 - 145 mmol/L Final    Potassium 01/03/2023 3.9  3.5 - 5.1 mmol/L Final    Chloride 01/03/2023 106  95 - 110 mmol/L Final    CO2 01/03/2023 26  23 - 29 mmol/L Final    Glucose 01/03/2023 112 (H)  70 - 110 mg/dL Final    BUN 01/03/2023 13  8 - 23 mg/dL Final     Creatinine 01/03/2023 0.7  0.5 - 1.4 mg/dL Final    Calcium 01/03/2023 9.2  8.7 - 10.5 mg/dL Final    Total Protein 01/03/2023 6.5  6.0 - 8.4 g/dL Final    Albumin 01/03/2023 3.7  3.5 - 5.2 g/dL Final    Total Bilirubin 01/03/2023 0.5  0.1 - 1.0 mg/dL Final    Comment: For infants and newborns, interpretation of results should be based  on gestational age, weight and in agreement with clinical  observations.    Premature Infant recommended reference ranges:  Up to 24 hours.............<8.0 mg/dL  Up to 48 hours............<12.0 mg/dL  3-5 days..................<15.0 mg/dL  6-29 days.................<15.0 mg/dL      Alkaline Phosphatase 01/03/2023 47 (L)  55 - 135 U/L Final    AST 01/03/2023 15  10 - 40 U/L Final    ALT 01/03/2023 21  10 - 44 U/L Final    Anion Gap 01/03/2023 9  8 - 16 mmol/L Final    eGFR 01/03/2023 >60  >60 mL/min/1.73 m^2 Final    CRP 01/03/2023 1.3  0.0 - 8.2 mg/L Final    Sed Rate 01/03/2023 15  0 - 20 mm/Hr Final    Hemoglobin A1C 01/03/2023 5.5  4.0 - 5.6 % Final    Comment: ADA Screening Guidelines:  5.7-6.4%  Consistent with prediabetes  >or=6.5%  Consistent with diabetes    High levels of fetal hemoglobin interfere with the HbA1C  assay. Heterozygous hemoglobin variants (HbS, HgC, etc)do  not significantly interfere with this assay.   However, presence of multiple variants may affect accuracy.      Estimated Avg Glucose 01/03/2023 111  68 - 131 mg/dL Final    Microalbumin, Urine 01/03/2023 10.0  ug/mL Final    Creatinine, Urine 01/03/2023 82.0  15.0 - 325.0 mg/dL Final    Microalb/Creat Ratio 01/03/2023 12.2  0.0 - 30.0 ug/mg Final       Assessment:     1. Type 2 diabetes mellitus without complication, without long-term current use of insulin    2. Encounter for screening mammogram for malignant neoplasm of breast    3. Corn of foot    4. Hypertension associated with diabetes    5. Hyperlipidemia associated with type 2 diabetes mellitus    6. Immunocompromised    7. Peripheral vascular  disease, unspecified      Plan:   Type 2 diabetes mellitus without complication, without long-term current use of insulin  -     semaglutide (OZEMPIC) 1 mg/dose (4 mg/3 mL); Inject 1 mg into the skin every 7 days.  Dispense: 9 mL; Refill: 3  -     Hemoglobin A1C; Future; Expected date: 09/14/2023  -discussed option of increasing dose of ozempic from 0.5 to 1mg. Pt would like to incrase.     Encounter for screening mammogram for malignant neoplasm of breast  -     Mammo Digital Screening Bilat; Future; Expected date: 04/14/2023  -due next month    [place] of foot  -pt will schedule follow up with podiatry  -foot exam was good today    Hypertension associated with diabetes  -stable and controlled on lotrel    Hyperlipidemia associated with type 2 diabetes mellitus  -stable on asa and lipitor  -recheck lipid in 6 months    Immunocompromised  -up to date with rheum    Peripheral vascular disease, unspecified  -stable. No ulcers.       Follow up in about 6 months (around 9/14/2023), or if symptoms worsen or fail to improve.

## 2023-03-21 ENCOUNTER — OFFICE VISIT (OUTPATIENT)
Dept: RHEUMATOLOGY | Facility: CLINIC | Age: 68
End: 2023-03-21
Payer: MEDICARE

## 2023-03-21 VITALS
BODY MASS INDEX: 30.97 KG/M2 | HEIGHT: 63 IN | WEIGHT: 174.81 LBS | SYSTOLIC BLOOD PRESSURE: 124 MMHG | HEART RATE: 89 BPM | DIASTOLIC BLOOD PRESSURE: 75 MMHG

## 2023-03-21 DIAGNOSIS — I73.9 PERIPHERAL VASCULAR DISEASE, UNSPECIFIED: ICD-10-CM

## 2023-03-21 DIAGNOSIS — Z79.899 LONG TERM CURRENT USE OF IMMUNOSUPPRESSIVE DRUG: ICD-10-CM

## 2023-03-21 DIAGNOSIS — M19.042 PRIMARY OSTEOARTHRITIS OF BOTH HANDS: ICD-10-CM

## 2023-03-21 DIAGNOSIS — E11.59 HYPERTENSION ASSOCIATED WITH DIABETES: Chronic | ICD-10-CM

## 2023-03-21 DIAGNOSIS — I15.2 HYPERTENSION ASSOCIATED WITH DIABETES: Chronic | ICD-10-CM

## 2023-03-21 DIAGNOSIS — M19.041 PRIMARY OSTEOARTHRITIS OF BOTH HANDS: ICD-10-CM

## 2023-03-21 DIAGNOSIS — M15.9 GENERALIZED OSTEOARTHRITIS OF MULTIPLE SITES: Chronic | ICD-10-CM

## 2023-03-21 DIAGNOSIS — M06.00 SERONEGATIVE RHEUMATOID ARTHRITIS: Primary | ICD-10-CM

## 2023-03-21 PROCEDURE — 3078F DIAST BP <80 MM HG: CPT | Mod: CPTII,S$GLB,, | Performed by: INTERNAL MEDICINE

## 2023-03-21 PROCEDURE — 99999 PR PBB SHADOW E&M-EST. PATIENT-LVL III: ICD-10-PCS | Mod: PBBFAC,,, | Performed by: INTERNAL MEDICINE

## 2023-03-21 PROCEDURE — 3008F PR BODY MASS INDEX (BMI) DOCUMENTED: ICD-10-PCS | Mod: CPTII,S$GLB,, | Performed by: INTERNAL MEDICINE

## 2023-03-21 PROCEDURE — 3072F LOW RISK FOR RETINOPATHY: CPT | Mod: CPTII,S$GLB,, | Performed by: INTERNAL MEDICINE

## 2023-03-21 PROCEDURE — 1126F PR PAIN SEVERITY QUANTIFIED, NO PAIN PRESENT: ICD-10-PCS | Mod: CPTII,S$GLB,, | Performed by: INTERNAL MEDICINE

## 2023-03-21 PROCEDURE — 99999 PR PBB SHADOW E&M-EST. PATIENT-LVL III: CPT | Mod: PBBFAC,,, | Performed by: INTERNAL MEDICINE

## 2023-03-21 PROCEDURE — 4010F ACE/ARB THERAPY RXD/TAKEN: CPT | Mod: CPTII,S$GLB,, | Performed by: INTERNAL MEDICINE

## 2023-03-21 PROCEDURE — 1159F MED LIST DOCD IN RCRD: CPT | Mod: CPTII,S$GLB,, | Performed by: INTERNAL MEDICINE

## 2023-03-21 PROCEDURE — 3078F PR MOST RECENT DIASTOLIC BLOOD PRESSURE < 80 MM HG: ICD-10-PCS | Mod: CPTII,S$GLB,, | Performed by: INTERNAL MEDICINE

## 2023-03-21 PROCEDURE — 3061F PR NEG MICROALBUMINURIA RESULT DOCUMENTED/REVIEW: ICD-10-PCS | Mod: CPTII,S$GLB,, | Performed by: INTERNAL MEDICINE

## 2023-03-21 PROCEDURE — 1159F PR MEDICATION LIST DOCUMENTED IN MEDICAL RECORD: ICD-10-PCS | Mod: CPTII,S$GLB,, | Performed by: INTERNAL MEDICINE

## 2023-03-21 PROCEDURE — 3061F NEG MICROALBUMINURIA REV: CPT | Mod: CPTII,S$GLB,, | Performed by: INTERNAL MEDICINE

## 2023-03-21 PROCEDURE — 3066F PR DOCUMENTATION OF TREATMENT FOR NEPHROPATHY: ICD-10-PCS | Mod: CPTII,S$GLB,, | Performed by: INTERNAL MEDICINE

## 2023-03-21 PROCEDURE — 3074F PR MOST RECENT SYSTOLIC BLOOD PRESSURE < 130 MM HG: ICD-10-PCS | Mod: CPTII,S$GLB,, | Performed by: INTERNAL MEDICINE

## 2023-03-21 PROCEDURE — 3044F PR MOST RECENT HEMOGLOBIN A1C LEVEL <7.0%: ICD-10-PCS | Mod: CPTII,S$GLB,, | Performed by: INTERNAL MEDICINE

## 2023-03-21 PROCEDURE — 3074F SYST BP LT 130 MM HG: CPT | Mod: CPTII,S$GLB,, | Performed by: INTERNAL MEDICINE

## 2023-03-21 PROCEDURE — 3066F NEPHROPATHY DOC TX: CPT | Mod: CPTII,S$GLB,, | Performed by: INTERNAL MEDICINE

## 2023-03-21 PROCEDURE — 1126F AMNT PAIN NOTED NONE PRSNT: CPT | Mod: CPTII,S$GLB,, | Performed by: INTERNAL MEDICINE

## 2023-03-21 PROCEDURE — 4010F PR ACE/ARB THEARPY RXD/TAKEN: ICD-10-PCS | Mod: CPTII,S$GLB,, | Performed by: INTERNAL MEDICINE

## 2023-03-21 PROCEDURE — 3044F HG A1C LEVEL LT 7.0%: CPT | Mod: CPTII,S$GLB,, | Performed by: INTERNAL MEDICINE

## 2023-03-21 PROCEDURE — 3008F BODY MASS INDEX DOCD: CPT | Mod: CPTII,S$GLB,, | Performed by: INTERNAL MEDICINE

## 2023-03-21 PROCEDURE — 3072F PR LOW RISK FOR RETINOPATHY: ICD-10-PCS | Mod: CPTII,S$GLB,, | Performed by: INTERNAL MEDICINE

## 2023-03-21 PROCEDURE — 99214 PR OFFICE/OUTPT VISIT, EST, LEVL IV, 30-39 MIN: ICD-10-PCS | Mod: S$GLB,,, | Performed by: INTERNAL MEDICINE

## 2023-03-21 PROCEDURE — 99214 OFFICE O/P EST MOD 30 MIN: CPT | Mod: S$GLB,,, | Performed by: INTERNAL MEDICINE

## 2023-03-21 RX ORDER — FOLIC ACID 1 MG/1
1 TABLET ORAL DAILY
Qty: 90 TABLET | Refills: 2 | Status: SHIPPED | OUTPATIENT
Start: 2023-03-21 | End: 2024-01-09

## 2023-03-21 RX ORDER — METHOTREXATE 2.5 MG/1
15 TABLET ORAL
Qty: 24 TABLET | Refills: 3 | Status: SHIPPED | OUTPATIENT
Start: 2023-03-21 | End: 2023-04-11

## 2023-03-21 RX ORDER — CELECOXIB 200 MG/1
200 CAPSULE ORAL 2 TIMES DAILY
Qty: 180 CAPSULE | Refills: 1 | Status: SHIPPED | OUTPATIENT
Start: 2023-03-21

## 2023-03-21 NOTE — PROGRESS NOTES
RHEUMATOLOGY OUTPATIENT CLINIC NOTE    3/21/2023    Attending Rheumatologist: Sukhjinder Preston  Primary Care Provider/Physician Requesting Consultation: JAMES Lane MD   Chief Complaint/Reason For Consultation:  Rheumatoid Arthritis and Osteoarthritis      Subjective:     May Montoya is a 67 y.o. White female with SNRA for f/u    No acute complaints.      Review of Systems   Constitutional:  Negative for fever and malaise/fatigue.   HENT:          Denies sicca sx.  Denies jaw claudication.   Eyes:  Negative for photophobia and pain.   Respiratory:  Negative for cough and shortness of breath.    Gastrointestinal:  Negative for blood in stool.        No hx of diverticulosis   Genitourinary:  Negative for hematuria.   Musculoskeletal:  Negative for joint pain.   Skin:  Negative for rash.        Denies RP   Neurological:  Negative for focal weakness, weakness and headaches.   Endo/Heme/Allergies:         No hx of dvt/pe     Chronic comorbid conditions affecting medical decision making today:  Past Medical History:   Diagnosis Date    Arthritis     Diabetes mellitus     Herniated disc, cervical     Hypertension     Type 2 diabetes mellitus with diabetic polyneuropathy, without long-term current use of insulin 3/29/2021     Past Surgical History:   Procedure Laterality Date    BREAST BIOPSY      BREAST SURGERY       SECTION      CHOLECYSTECTOMY      DILATION AND CURETTAGE OF UTERUS      GALLBLADDER SURGERY      HYSTEROSCOPY      JOINT REPLACEMENT      NECK SURGERY  2020    peripheral artery angioplasty      TOTAL KNEE ARTHROPLASTY      L    TUBAL LIGATION       Family History   Problem Relation Age of Onset    Diabetes Mother     Arthritis Mother     Hypertension Mother     Miscarriages / Stillbirths Mother      Social History     Tobacco Use   Smoking Status Never   Smokeless Tobacco Never       Current Outpatient Medications:     amlodipine-benazepril 2.5-10 mg (LOTREL) 2.5-10 mg per capsule,  Take 1 capsule by mouth once daily., Disp: 90 capsule, Rfl: 2    aspirin (ECOTRIN) 81 MG EC tablet, Take 81 mg by mouth once daily., Disp: , Rfl:     atorvastatin (LIPITOR) 40 MG tablet, Take 1 tablet (40 mg total) by mouth every evening., Disp: 90 tablet, Rfl: 2    blood sugar diagnostic Strp, Check blood glucose 1-2 times daily (BEFORE breakfast or other meal) and as needed for symptoms of low or high blood glucose, Disp: 200 each, Rfl: 11    blood-glucose meter kit, Check blood glucose 1-2 times daily (BEFORE breakfast or other meal) and as needed for symptoms of low or high blood glucose, Disp: 1 each, Rfl: 0    cefUROXime (CEFTIN) 250 MG tablet, take 1 tablet (250 mg) by oral route 2 times per day for 10 days, Disp: , Rfl:     celecoxib (CELEBREX) 200 MG capsule, Take 1 capsule by mouth 2 times daily., Disp: 180 capsule, Rfl: 1    cetirizine (ZYRTEC) 5 MG tablet, Take 5 mg by mouth once daily., Disp: , Rfl:     cholecalciferol, vitamin D3, (VITAMIN D3) 50 mcg (2,000 unit) Cap, Take 1 capsule by mouth once daily., Disp: , Rfl:     folic acid (FOLVITE) 1 MG tablet, Take 1 tablet (1 mg total) by mouth once daily., Disp: 90 tablet, Rfl: 2    GLUCOSAMINE/D3/BOSWELLIA MAYELIN (OSTEO BI-FLEX, 5-LOXIN, ORAL), Take 2 tablets by mouth once daily., Disp: , Rfl:     lancets Misc, Check blood glucose 1-2 times daily (BEFORE breakfast or other meal) and as needed for symptoms of low or high blood glucose, Disp: 200 each, Rfl: 11    metaxalone (SKELAXIN) 800 MG tablet, TAKE 1 TABLET TWICE DAILY AS NEEDED FOR SPASMS, Disp: , Rfl: 1    metFORMIN (GLUCOPHAGE) 500 MG tablet, Take 2 tablets (1,000 mg total) by mouth 2 (two) times daily with meals., Disp: 360 tablet, Rfl: 2    methotrexate 2.5 MG Tab, Take 4 tablets (10 mg total) by mouth every 7 days., Disp: 16 tablet, Rfl: 3    multivit-mineral-iron-lutein Tab, Take 1 tablet by mouth once daily., Disp: , Rfl:     semaglutide (OZEMPIC) 1 mg/dose (4 mg/3 mL), Inject 1 mg into the  skin every 7 days., Disp: 9 mL, Rfl: 3     Objective:     Vitals:    03/21/23 1527   BP: 124/75   Pulse: 89     Physical Exam   Constitutional: She appears obese.   Eyes: Conjunctivae are normal.   Pulmonary/Chest: Effort normal. No respiratory distress.   Musculoskeletal:         General: Tenderness and deformity present. No swelling. Normal range of motion.   Neurological: She displays no weakness.   Skin: No rash noted.     Reviewed available old and all outside pertinent medical records available.    All lab results personally reviewed and interpreted by me.       ASSESSMENT      Encounter Diagnoses   Name Primary?    Seronegative rheumatoid arthritis Yes    Primary osteoarthritis of both hands     Generalized osteoarthritis of multiple sites     Hypertension associated with diabetes     Peripheral vascular disease, unspecified     Long term current use of immunosuppressive drug       PLAN     CDAI: low disease activity.  Chronic thumb CMC pain Rt hand, inadequate response to prior local CSI.  No squeeze tenderness otherwise.  Labs w/o evidence of toxicity from DMARD. Negative RA serologies. DJD changes on imaging w/o reported marginal erosive changes.  Suggest voltaren gel and thumb spica split use.  Trial of MTX dose increase to 15mg once per week split dose if refractory to topical therapy after 1 month.  Repeat labs and XR close to f/u visit.    Sukhjinder Preston M.D.

## 2023-04-11 DIAGNOSIS — M06.00 SERONEGATIVE RHEUMATOID ARTHRITIS: ICD-10-CM

## 2023-04-11 RX ORDER — METHOTREXATE 2.5 MG/1
TABLET ORAL
Qty: 16 TABLET | Refills: 3 | Status: SHIPPED | OUTPATIENT
Start: 2023-04-11 | End: 2023-04-18

## 2023-04-11 NOTE — TELEPHONE ENCOUNTER
Please ensure patient is not taking this medication daily. Much too soon to be requesting refill on this.

## 2023-04-17 ENCOUNTER — TELEPHONE (OUTPATIENT)
Dept: RHEUMATOLOGY | Facility: CLINIC | Age: 68
End: 2023-04-17
Payer: MEDICARE

## 2023-04-17 NOTE — TELEPHONE ENCOUNTER
"Dr Preston increased dose of MTX on 3/21/2023.  She is requesting new prescription to reflect increase of dose      "Trial of MTX dose increase to 15mg once per week split dose"   "

## 2023-04-17 NOTE — TELEPHONE ENCOUNTER
----- Message from Sofi Alvares sent at 4/17/2023  9:35 AM CDT -----  Contact: Self  Patient states someone called her last week to discuss a medication she's currently on. Please call back [phone]

## 2023-04-18 DIAGNOSIS — M06.00 SERONEGATIVE RHEUMATOID ARTHRITIS: ICD-10-CM

## 2023-04-18 RX ORDER — METHOTREXATE 2.5 MG/1
15 TABLET ORAL
Qty: 72 TABLET | Refills: 1 | Status: SHIPPED | OUTPATIENT
Start: 2023-04-18 | End: 2023-07-17

## 2023-04-25 ENCOUNTER — OFFICE VISIT (OUTPATIENT)
Dept: INTERNAL MEDICINE | Facility: CLINIC | Age: 68
End: 2023-04-25
Payer: MEDICARE

## 2023-04-25 VITALS
HEIGHT: 63 IN | SYSTOLIC BLOOD PRESSURE: 122 MMHG | BODY MASS INDEX: 30.62 KG/M2 | TEMPERATURE: 97 F | OXYGEN SATURATION: 98 % | WEIGHT: 172.81 LBS | HEART RATE: 77 BPM | DIASTOLIC BLOOD PRESSURE: 66 MMHG

## 2023-04-25 DIAGNOSIS — M06.00 SERONEGATIVE RHEUMATOID ARTHRITIS: ICD-10-CM

## 2023-04-25 DIAGNOSIS — E11.69 HYPERLIPIDEMIA ASSOCIATED WITH TYPE 2 DIABETES MELLITUS: Chronic | ICD-10-CM

## 2023-04-25 DIAGNOSIS — D84.821 IMMUNOCOMPROMISED STATE DUE TO DRUG THERAPY: ICD-10-CM

## 2023-04-25 DIAGNOSIS — E11.59 HYPERTENSION ASSOCIATED WITH DIABETES: Chronic | ICD-10-CM

## 2023-04-25 DIAGNOSIS — Z12.31 BREAST CANCER SCREENING BY MAMMOGRAM: ICD-10-CM

## 2023-04-25 DIAGNOSIS — E11.9 TYPE 2 DIABETES MELLITUS WITHOUT COMPLICATION, WITHOUT LONG-TERM CURRENT USE OF INSULIN: ICD-10-CM

## 2023-04-25 DIAGNOSIS — E11.42 TYPE 2 DIABETES MELLITUS WITH DIABETIC POLYNEUROPATHY, WITHOUT LONG-TERM CURRENT USE OF INSULIN: Chronic | ICD-10-CM

## 2023-04-25 DIAGNOSIS — Z79.899 IMMUNOCOMPROMISED STATE DUE TO DRUG THERAPY: ICD-10-CM

## 2023-04-25 DIAGNOSIS — I15.2 HYPERTENSION ASSOCIATED WITH DIABETES: Chronic | ICD-10-CM

## 2023-04-25 DIAGNOSIS — E11.69 TYPE 2 DIABETES MELLITUS WITH OTHER SPECIFIED COMPLICATION, WITHOUT LONG-TERM CURRENT USE OF INSULIN: ICD-10-CM

## 2023-04-25 DIAGNOSIS — F07.81 POST-CONCUSSION SYNDROME: Primary | ICD-10-CM

## 2023-04-25 DIAGNOSIS — E78.5 HYPERLIPIDEMIA ASSOCIATED WITH TYPE 2 DIABETES MELLITUS: Chronic | ICD-10-CM

## 2023-04-25 PROBLEM — I73.9 PERIPHERAL VASCULAR DISEASE, UNSPECIFIED: Status: RESOLVED | Noted: 2020-05-26 | Resolved: 2023-04-25

## 2023-04-25 PROCEDURE — 3066F NEPHROPATHY DOC TX: CPT | Mod: CPTII,S$GLB,, | Performed by: FAMILY MEDICINE

## 2023-04-25 PROCEDURE — 3044F PR MOST RECENT HEMOGLOBIN A1C LEVEL <7.0%: ICD-10-PCS | Mod: CPTII,S$GLB,, | Performed by: FAMILY MEDICINE

## 2023-04-25 PROCEDURE — 99999 PR PBB SHADOW E&M-EST. PATIENT-LVL IV: ICD-10-PCS | Mod: PBBFAC,,, | Performed by: FAMILY MEDICINE

## 2023-04-25 PROCEDURE — 3044F HG A1C LEVEL LT 7.0%: CPT | Mod: CPTII,S$GLB,, | Performed by: FAMILY MEDICINE

## 2023-04-25 PROCEDURE — 99214 PR OFFICE/OUTPT VISIT, EST, LEVL IV, 30-39 MIN: ICD-10-PCS | Mod: S$GLB,,, | Performed by: FAMILY MEDICINE

## 2023-04-25 PROCEDURE — 1101F PR PT FALLS ASSESS DOC 0-1 FALLS W/OUT INJ PAST YR: ICD-10-PCS | Mod: CPTII,S$GLB,, | Performed by: FAMILY MEDICINE

## 2023-04-25 PROCEDURE — 99999 PR PBB SHADOW E&M-EST. PATIENT-LVL IV: CPT | Mod: PBBFAC,,, | Performed by: FAMILY MEDICINE

## 2023-04-25 PROCEDURE — 1159F PR MEDICATION LIST DOCUMENTED IN MEDICAL RECORD: ICD-10-PCS | Mod: CPTII,S$GLB,, | Performed by: FAMILY MEDICINE

## 2023-04-25 PROCEDURE — 1126F AMNT PAIN NOTED NONE PRSNT: CPT | Mod: CPTII,S$GLB,, | Performed by: FAMILY MEDICINE

## 2023-04-25 PROCEDURE — 99214 OFFICE O/P EST MOD 30 MIN: CPT | Mod: S$GLB,,, | Performed by: FAMILY MEDICINE

## 2023-04-25 PROCEDURE — 3008F BODY MASS INDEX DOCD: CPT | Mod: CPTII,S$GLB,, | Performed by: FAMILY MEDICINE

## 2023-04-25 PROCEDURE — 3288F PR FALLS RISK ASSESSMENT DOCUMENTED: ICD-10-PCS | Mod: CPTII,S$GLB,, | Performed by: FAMILY MEDICINE

## 2023-04-25 PROCEDURE — 3061F PR NEG MICROALBUMINURIA RESULT DOCUMENTED/REVIEW: ICD-10-PCS | Mod: CPTII,S$GLB,, | Performed by: FAMILY MEDICINE

## 2023-04-25 PROCEDURE — 3072F PR LOW RISK FOR RETINOPATHY: ICD-10-PCS | Mod: CPTII,S$GLB,, | Performed by: FAMILY MEDICINE

## 2023-04-25 PROCEDURE — 4010F ACE/ARB THERAPY RXD/TAKEN: CPT | Mod: CPTII,S$GLB,, | Performed by: FAMILY MEDICINE

## 2023-04-25 PROCEDURE — 3078F PR MOST RECENT DIASTOLIC BLOOD PRESSURE < 80 MM HG: ICD-10-PCS | Mod: CPTII,S$GLB,, | Performed by: FAMILY MEDICINE

## 2023-04-25 PROCEDURE — 3072F LOW RISK FOR RETINOPATHY: CPT | Mod: CPTII,S$GLB,, | Performed by: FAMILY MEDICINE

## 2023-04-25 PROCEDURE — 4010F PR ACE/ARB THEARPY RXD/TAKEN: ICD-10-PCS | Mod: CPTII,S$GLB,, | Performed by: FAMILY MEDICINE

## 2023-04-25 PROCEDURE — 1126F PR PAIN SEVERITY QUANTIFIED, NO PAIN PRESENT: ICD-10-PCS | Mod: CPTII,S$GLB,, | Performed by: FAMILY MEDICINE

## 2023-04-25 PROCEDURE — 1159F MED LIST DOCD IN RCRD: CPT | Mod: CPTII,S$GLB,, | Performed by: FAMILY MEDICINE

## 2023-04-25 PROCEDURE — 3078F DIAST BP <80 MM HG: CPT | Mod: CPTII,S$GLB,, | Performed by: FAMILY MEDICINE

## 2023-04-25 PROCEDURE — 3008F PR BODY MASS INDEX (BMI) DOCUMENTED: ICD-10-PCS | Mod: CPTII,S$GLB,, | Performed by: FAMILY MEDICINE

## 2023-04-25 PROCEDURE — 1101F PT FALLS ASSESS-DOCD LE1/YR: CPT | Mod: CPTII,S$GLB,, | Performed by: FAMILY MEDICINE

## 2023-04-25 PROCEDURE — 3288F FALL RISK ASSESSMENT DOCD: CPT | Mod: CPTII,S$GLB,, | Performed by: FAMILY MEDICINE

## 2023-04-25 PROCEDURE — 3074F SYST BP LT 130 MM HG: CPT | Mod: CPTII,S$GLB,, | Performed by: FAMILY MEDICINE

## 2023-04-25 PROCEDURE — 3061F NEG MICROALBUMINURIA REV: CPT | Mod: CPTII,S$GLB,, | Performed by: FAMILY MEDICINE

## 2023-04-25 PROCEDURE — 3074F PR MOST RECENT SYSTOLIC BLOOD PRESSURE < 130 MM HG: ICD-10-PCS | Mod: CPTII,S$GLB,, | Performed by: FAMILY MEDICINE

## 2023-04-25 PROCEDURE — 3066F PR DOCUMENTATION OF TREATMENT FOR NEPHROPATHY: ICD-10-PCS | Mod: CPTII,S$GLB,, | Performed by: FAMILY MEDICINE

## 2023-04-25 RX ORDER — AMLODIPINE BESYLATE AND BENAZEPRIL HYDROCHLORIDE 2.5; 1 MG/1; MG/1
1 CAPSULE ORAL DAILY
Qty: 90 CAPSULE | Refills: 2 | Status: SHIPPED | OUTPATIENT
Start: 2023-04-25 | End: 2023-07-10 | Stop reason: SDUPTHER

## 2023-04-25 RX ORDER — SEMAGLUTIDE 1.34 MG/ML
1 INJECTION, SOLUTION SUBCUTANEOUS
Qty: 9 ML | Refills: 3 | Status: SHIPPED | OUTPATIENT
Start: 2023-04-25 | End: 2023-07-10 | Stop reason: DRUGHIGH

## 2023-04-25 RX ORDER — METFORMIN HYDROCHLORIDE 500 MG/1
1000 TABLET ORAL 2 TIMES DAILY WITH MEALS
Qty: 360 TABLET | Refills: 2 | Status: SHIPPED | OUTPATIENT
Start: 2023-04-25 | End: 2023-07-10 | Stop reason: SDUPTHER

## 2023-04-25 RX ORDER — ATORVASTATIN CALCIUM 40 MG/1
40 TABLET, FILM COATED ORAL NIGHTLY
Qty: 90 TABLET | Refills: 2 | Status: SHIPPED | OUTPATIENT
Start: 2023-04-25 | End: 2023-07-10 | Stop reason: SDUPTHER

## 2023-04-25 NOTE — ASSESSMENT & PLAN NOTE
BP Readings from Last 6 Encounters:   04/25/23 122/66   03/21/23 124/75   03/14/23 118/64   01/09/23 134/72   09/19/22 132/77   09/12/22 126/74     Lab Results   Component Value Date    EGFRNORACEVR >60 03/13/2023    CREATININE 0.6 03/13/2023    BUN 10 03/13/2023    K 4.7 03/13/2023     03/13/2023     03/13/2023     Results for orders placed or performed in visit on 12/24/19   SCHEDULED EKG 12-LEAD (to Muse)    Collection Time: 12/24/19 10:23 AM    Narrative    Test Reason : R42,    Vent. Rate : 073 BPM     Atrial Rate : 073 BPM     P-R Int : 176 ms          QRS Dur : 100 ms      QT Int : 398 ms       P-R-T Axes : 046 000 028 degrees     QTc Int : 438 ms    Normal sinus rhythm  Anterolateral infarct ,age undetermined  Abnormal ECG  No previous ECGs available  Confirmed by KARL ELIZABETH MD ([id number]) on 12/24/2019 11:48:30 AM    Referred By: ROBBIE BRENNER           Confirmed By:KARL ELIZABETH MD

## 2023-04-25 NOTE — ASSESSMENT & PLAN NOTE
ASSESSMENT: This is a chronic problem that appears compensated/controlled and stable on methotrexate and celecoxib.  TREATMENT PLAN: Continue present treatment plan.   Lab Results   Component Value Date    CRP 1.8 03/13/2023    CRP 1.3 01/03/2023    CRP 1.1 09/19/2022    SEDRATE 10 03/13/2023    SEDRATE 15 01/03/2023    SEDRATE 8 09/19/2022     Lab Results   Component Value Date    CCPANTIBODIE <0.5 06/15/2021
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
## 2023-04-25 NOTE — ASSESSMENT & PLAN NOTE
Lab Results   Component Value Date    CHOL 150 09/14/2022    CHOL 159 06/21/2021    TRIG 94 09/14/2022    TRIG 126 06/21/2021    HDL 46 09/14/2022    HDL 43 06/21/2021    LDLCALC 85.2 09/14/2022    LDLCALC 90.8 06/21/2021    NONHDLCHOL 104 09/14/2022    NONHDLCHOL 116 06/21/2021    AST 13 03/13/2023    ALT 15 03/13/2023     The 10-year ASCVD risk score (Gilma NEIL, et al., 2019) is: 18.1%    Values used to calculate the score:      Age: 67 years      Sex: Female      Is Non- : No      Diabetic: Yes      Tobacco smoker: No      Systolic Blood Pressure: 122 mmHg      Is BP treated: Yes      HDL Cholesterol: 46 mg/dL      Total Cholesterol: 150 mg/dL

## 2023-04-25 NOTE — ASSESSMENT & PLAN NOTE
Diabetes Management Status    Statin: Taking  ACE/ARB: Taking    Screening or Prevention Patient's value Goal Complete/Controlled?   HgA1C Testing and Control   Lab Results   Component Value Date    HGBA1C 5.5 01/03/2023      Annually/Less than 8% Yes   Lipid profile : 09/14/2022 Annually Yes   LDL control Lab Results   Component Value Date    LDLCALC 85.2 09/14/2022    Annually/Less than 100 mg/dl  Yes   Nephropathy screening Lab Results   Component Value Date    LABMICR 10.0 01/03/2023     Lab Results   Component Value Date    PROTEINUA Negative 05/26/2020    Annually Yes   Blood pressure BP Readings from Last 1 Encounters:   04/25/23 122/66    Less than 140/90 Yes   Dilated retinal exam : 08/22/2022 Annually Yes   Foot exam   : 03/14/2023 Annually Yes     Lab Results   Component Value Date    HGBA1C 5.5 01/03/2023    HGBA1C 5.4 06/20/2022    HGBA1C 6.0 (H) 12/13/2021    EGFRNORACEVR >60 03/13/2023    MICALBCREAT 12.2 01/03/2023    LDLCALC 85.2 09/14/2022     No results found for: GLUTAMICACID, CPEPTIDE   Last 5 Patient Entered Readings                                          Most Recent A1c:     There is no flowsheet data to display.        HEALTH MAINTENANCE: Diabetic health maintenance interventions reviewed and are up to date except for:  There are no preventive care reminders to display for this patient.

## 2023-05-09 ENCOUNTER — OFFICE VISIT (OUTPATIENT)
Dept: INTERNAL MEDICINE | Facility: CLINIC | Age: 68
End: 2023-05-09
Payer: MEDICARE

## 2023-05-09 VITALS
OXYGEN SATURATION: 97 % | BODY MASS INDEX: 30.07 KG/M2 | HEART RATE: 84 BPM | RESPIRATION RATE: 18 BRPM | DIASTOLIC BLOOD PRESSURE: 70 MMHG | SYSTOLIC BLOOD PRESSURE: 120 MMHG | WEIGHT: 169.75 LBS | TEMPERATURE: 98 F

## 2023-05-09 DIAGNOSIS — Z79.899 LONG TERM CURRENT USE OF IMMUNOSUPPRESSIVE DRUG: ICD-10-CM

## 2023-05-09 DIAGNOSIS — I15.2 HYPERTENSION ASSOCIATED WITH DIABETES: Chronic | ICD-10-CM

## 2023-05-09 DIAGNOSIS — E11.69 TYPE 2 DIABETES MELLITUS WITH OTHER SPECIFIED COMPLICATION, WITHOUT LONG-TERM CURRENT USE OF INSULIN: Chronic | ICD-10-CM

## 2023-05-09 DIAGNOSIS — E78.5 HYPERLIPIDEMIA ASSOCIATED WITH TYPE 2 DIABETES MELLITUS: Chronic | ICD-10-CM

## 2023-05-09 DIAGNOSIS — E11.59 HYPERTENSION ASSOCIATED WITH DIABETES: Chronic | ICD-10-CM

## 2023-05-09 DIAGNOSIS — E11.69 HYPERLIPIDEMIA ASSOCIATED WITH TYPE 2 DIABETES MELLITUS: Chronic | ICD-10-CM

## 2023-05-09 DIAGNOSIS — F07.81 POST-CONCUSSION SYNDROME: Primary | ICD-10-CM

## 2023-05-09 DIAGNOSIS — M06.00 SERONEGATIVE RHEUMATOID ARTHRITIS: Chronic | ICD-10-CM

## 2023-05-09 PROCEDURE — 1126F PR PAIN SEVERITY QUANTIFIED, NO PAIN PRESENT: ICD-10-PCS | Mod: CPTII,S$GLB,, | Performed by: PHYSICIAN ASSISTANT

## 2023-05-09 PROCEDURE — 99213 OFFICE O/P EST LOW 20 MIN: CPT | Mod: S$GLB,,, | Performed by: PHYSICIAN ASSISTANT

## 2023-05-09 PROCEDURE — 3008F PR BODY MASS INDEX (BMI) DOCUMENTED: ICD-10-PCS | Mod: CPTII,S$GLB,, | Performed by: PHYSICIAN ASSISTANT

## 2023-05-09 PROCEDURE — 3078F PR MOST RECENT DIASTOLIC BLOOD PRESSURE < 80 MM HG: ICD-10-PCS | Mod: CPTII,S$GLB,, | Performed by: PHYSICIAN ASSISTANT

## 2023-05-09 PROCEDURE — 3066F PR DOCUMENTATION OF TREATMENT FOR NEPHROPATHY: ICD-10-PCS | Mod: CPTII,S$GLB,, | Performed by: PHYSICIAN ASSISTANT

## 2023-05-09 PROCEDURE — 3072F PR LOW RISK FOR RETINOPATHY: ICD-10-PCS | Mod: CPTII,S$GLB,, | Performed by: PHYSICIAN ASSISTANT

## 2023-05-09 PROCEDURE — 3078F DIAST BP <80 MM HG: CPT | Mod: CPTII,S$GLB,, | Performed by: PHYSICIAN ASSISTANT

## 2023-05-09 PROCEDURE — 3072F LOW RISK FOR RETINOPATHY: CPT | Mod: CPTII,S$GLB,, | Performed by: PHYSICIAN ASSISTANT

## 2023-05-09 PROCEDURE — 3008F BODY MASS INDEX DOCD: CPT | Mod: CPTII,S$GLB,, | Performed by: PHYSICIAN ASSISTANT

## 2023-05-09 PROCEDURE — 4010F PR ACE/ARB THEARPY RXD/TAKEN: ICD-10-PCS | Mod: CPTII,S$GLB,, | Performed by: PHYSICIAN ASSISTANT

## 2023-05-09 PROCEDURE — 4010F ACE/ARB THERAPY RXD/TAKEN: CPT | Mod: CPTII,S$GLB,, | Performed by: PHYSICIAN ASSISTANT

## 2023-05-09 PROCEDURE — 99213 PR OFFICE/OUTPT VISIT, EST, LEVL III, 20-29 MIN: ICD-10-PCS | Mod: S$GLB,,, | Performed by: PHYSICIAN ASSISTANT

## 2023-05-09 PROCEDURE — 3044F HG A1C LEVEL LT 7.0%: CPT | Mod: CPTII,S$GLB,, | Performed by: PHYSICIAN ASSISTANT

## 2023-05-09 PROCEDURE — 3074F PR MOST RECENT SYSTOLIC BLOOD PRESSURE < 130 MM HG: ICD-10-PCS | Mod: CPTII,S$GLB,, | Performed by: PHYSICIAN ASSISTANT

## 2023-05-09 PROCEDURE — 3061F NEG MICROALBUMINURIA REV: CPT | Mod: CPTII,S$GLB,, | Performed by: PHYSICIAN ASSISTANT

## 2023-05-09 PROCEDURE — 99999 PR PBB SHADOW E&M-EST. PATIENT-LVL IV: ICD-10-PCS | Mod: PBBFAC,,, | Performed by: PHYSICIAN ASSISTANT

## 2023-05-09 PROCEDURE — 1160F RVW MEDS BY RX/DR IN RCRD: CPT | Mod: CPTII,S$GLB,, | Performed by: PHYSICIAN ASSISTANT

## 2023-05-09 PROCEDURE — 1159F MED LIST DOCD IN RCRD: CPT | Mod: CPTII,S$GLB,, | Performed by: PHYSICIAN ASSISTANT

## 2023-05-09 PROCEDURE — 3074F SYST BP LT 130 MM HG: CPT | Mod: CPTII,S$GLB,, | Performed by: PHYSICIAN ASSISTANT

## 2023-05-09 PROCEDURE — 3066F NEPHROPATHY DOC TX: CPT | Mod: CPTII,S$GLB,, | Performed by: PHYSICIAN ASSISTANT

## 2023-05-09 PROCEDURE — 3044F PR MOST RECENT HEMOGLOBIN A1C LEVEL <7.0%: ICD-10-PCS | Mod: CPTII,S$GLB,, | Performed by: PHYSICIAN ASSISTANT

## 2023-05-09 PROCEDURE — 1160F PR REVIEW ALL MEDS BY PRESCRIBER/CLIN PHARMACIST DOCUMENTED: ICD-10-PCS | Mod: CPTII,S$GLB,, | Performed by: PHYSICIAN ASSISTANT

## 2023-05-09 PROCEDURE — 1159F PR MEDICATION LIST DOCUMENTED IN MEDICAL RECORD: ICD-10-PCS | Mod: CPTII,S$GLB,, | Performed by: PHYSICIAN ASSISTANT

## 2023-05-09 PROCEDURE — 99999 PR PBB SHADOW E&M-EST. PATIENT-LVL IV: CPT | Mod: PBBFAC,,, | Performed by: PHYSICIAN ASSISTANT

## 2023-05-09 PROCEDURE — 1126F AMNT PAIN NOTED NONE PRSNT: CPT | Mod: CPTII,S$GLB,, | Performed by: PHYSICIAN ASSISTANT

## 2023-05-09 PROCEDURE — 3061F PR NEG MICROALBUMINURIA RESULT DOCUMENTED/REVIEW: ICD-10-PCS | Mod: CPTII,S$GLB,, | Performed by: PHYSICIAN ASSISTANT
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
## 2023-06-07 ENCOUNTER — NURSE TRIAGE (OUTPATIENT)
Dept: ADMINISTRATIVE | Facility: CLINIC | Age: 68
End: 2023-06-07
Payer: MEDICARE
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
## 2023-06-25 NOTE — TELEPHONE ENCOUNTER
----- Message from Ivana Francisco sent at 3/21/2019 12:04 PM CDT -----  Contact: self  Type:  RX Refill Request    Who Called: danny daugherty  Refill or New Rx:refill  RX Name and Strength:celecoxib (CELEBREX) 200 MG capsule//metFORMIN (GLUCOPHAGE) 500 MG tablet//pravastatin (PRAVACHOL) 20 MG tablet//amlodipine-benazepril 2.5-10 mg (LOTREL) 2.5-10 mg per capsule  How is the patient currently taking it? (ex. 1XDay):2xday//1xday  Is this a 30 day or 90 day RX:30  Preferred Pharmacy with phone number:  [facility] [address] [place], [place] - [address] AT [facility]  [address] [facility] [address]  Phone: [phone] Fax: [phone]      Local or Mail Order:local  Ordering Provider:dr irwin  Would the patient rather a call back or a response via [facility]? call  Best Call Back Number:[phone]  Additional Information:      Yes

## 2023-07-03 ENCOUNTER — LAB VISIT (OUTPATIENT)
Dept: LAB | Facility: HOSPITAL | Age: 68
End: 2023-07-03
Attending: FAMILY MEDICINE
Payer: MEDICARE

## 2023-07-03 DIAGNOSIS — E78.5 HYPERLIPIDEMIA ASSOCIATED WITH TYPE 2 DIABETES MELLITUS: Chronic | ICD-10-CM

## 2023-07-03 DIAGNOSIS — E11.69 HYPERLIPIDEMIA ASSOCIATED WITH TYPE 2 DIABETES MELLITUS: Chronic | ICD-10-CM

## 2023-07-03 DIAGNOSIS — E11.59 HYPERTENSION ASSOCIATED WITH DIABETES: Chronic | ICD-10-CM

## 2023-07-03 DIAGNOSIS — E11.69 TYPE 2 DIABETES MELLITUS WITH OTHER SPECIFIED COMPLICATION, WITHOUT LONG-TERM CURRENT USE OF INSULIN: ICD-10-CM

## 2023-07-03 DIAGNOSIS — I15.2 HYPERTENSION ASSOCIATED WITH DIABETES: Chronic | ICD-10-CM

## 2023-07-03 LAB
ALBUMIN SERPL BCP-MCNC: 3.7 G/DL (ref 3.5–5.2)
ALP SERPL-CCNC: 41 U/L (ref 55–135)
ALT SERPL W/O P-5'-P-CCNC: 16 U/L (ref 10–44)
ANION GAP SERPL CALC-SCNC: 8 MMOL/L (ref 8–16)
AST SERPL-CCNC: 16 U/L (ref 10–40)
BILIRUB SERPL-MCNC: 0.5 MG/DL (ref 0.1–1)
BUN SERPL-MCNC: 13 MG/DL (ref 8–23)
CALCIUM SERPL-MCNC: 9.2 MG/DL (ref 8.7–10.5)
CHLORIDE SERPL-SCNC: 106 MMOL/L (ref 95–110)
CHOLEST SERPL-MCNC: 147 MG/DL (ref 120–199)
CHOLEST/HDLC SERPL: 2.9 {RATIO} (ref 2–5)
CO2 SERPL-SCNC: 28 MMOL/L (ref 23–29)
CREAT SERPL-MCNC: 0.6 MG/DL (ref 0.5–1.4)
EST. GFR  (NO RACE VARIABLE): >60 ML/MIN/1.73 M^2
ESTIMATED AVG GLUCOSE: 105 MG/DL (ref 68–131)
GLUCOSE SERPL-MCNC: 86 MG/DL (ref 70–110)
HBA1C MFR BLD: 5.3 % (ref 4–5.6)
HDLC SERPL-MCNC: 50 MG/DL (ref 40–75)
HDLC SERPL: 34 % (ref 20–50)
LDLC SERPL CALC-MCNC: 82.8 MG/DL (ref 63–159)
NONHDLC SERPL-MCNC: 97 MG/DL
POTASSIUM SERPL-SCNC: 4.3 MMOL/L (ref 3.5–5.1)
PROT SERPL-MCNC: 6.3 G/DL (ref 6–8.4)
SODIUM SERPL-SCNC: 142 MMOL/L (ref 136–145)
TRIGL SERPL-MCNC: 71 MG/DL (ref 30–150)

## 2023-07-03 PROCEDURE — 36415 COLL VENOUS BLD VENIPUNCTURE: CPT | Performed by: FAMILY MEDICINE

## 2023-07-03 PROCEDURE — 83036 HEMOGLOBIN GLYCOSYLATED A1C: CPT | Performed by: FAMILY MEDICINE

## 2023-07-03 PROCEDURE — 80053 COMPREHEN METABOLIC PANEL: CPT | Performed by: FAMILY MEDICINE

## 2023-07-03 PROCEDURE — 80061 LIPID PANEL: CPT | Performed by: FAMILY MEDICINE

## 2023-07-04 NOTE — PROGRESS NOTES
Results to be addressed at upcoming appointment(s) already scheduled.  Future Appointments  7/10/2023  10:20 AM   [facility]  7/25/2023  1:50 PM    LW  MAMMO1 SCREEN        LW MAMMO          [place] [facility]  7/25/2023  2:50 PM    Gwen Krishnan MD               [facility]          [place] [facility]  10/2/2023  9:30 AM    [facility]            [place]  10/9/2023  2:20 PM    [facility]

## 2023-07-10 ENCOUNTER — OFFICE VISIT (OUTPATIENT)
Dept: INTERNAL MEDICINE | Facility: CLINIC | Age: 68
End: 2023-07-10
Payer: MEDICARE

## 2023-07-10 VITALS
RESPIRATION RATE: 17 BRPM | HEIGHT: 63 IN | TEMPERATURE: 98 F | WEIGHT: 170 LBS | OXYGEN SATURATION: 99 % | SYSTOLIC BLOOD PRESSURE: 122 MMHG | BODY MASS INDEX: 30.12 KG/M2 | DIASTOLIC BLOOD PRESSURE: 70 MMHG | HEART RATE: 98 BPM

## 2023-07-10 DIAGNOSIS — E78.5 HYPERLIPIDEMIA ASSOCIATED WITH TYPE 2 DIABETES MELLITUS: Chronic | ICD-10-CM

## 2023-07-10 DIAGNOSIS — E11.69 TYPE 2 DIABETES MELLITUS WITH OTHER SPECIFIED COMPLICATION, WITHOUT LONG-TERM CURRENT USE OF INSULIN: ICD-10-CM

## 2023-07-10 DIAGNOSIS — E11.42 TYPE 2 DIABETES MELLITUS WITH DIABETIC POLYNEUROPATHY, WITHOUT LONG-TERM CURRENT USE OF INSULIN: Chronic | ICD-10-CM

## 2023-07-10 DIAGNOSIS — I15.2 HYPERTENSION ASSOCIATED WITH DIABETES: Chronic | ICD-10-CM

## 2023-07-10 DIAGNOSIS — E11.69 HYPERLIPIDEMIA ASSOCIATED WITH TYPE 2 DIABETES MELLITUS: Chronic | ICD-10-CM

## 2023-07-10 DIAGNOSIS — E11.59 HYPERTENSION ASSOCIATED WITH DIABETES: Chronic | ICD-10-CM

## 2023-07-10 PROCEDURE — 4010F PR ACE/ARB THEARPY RXD/TAKEN: ICD-10-PCS | Mod: CPTII,S$GLB,, | Performed by: FAMILY MEDICINE

## 2023-07-10 PROCEDURE — 3072F LOW RISK FOR RETINOPATHY: CPT | Mod: CPTII,S$GLB,, | Performed by: FAMILY MEDICINE

## 2023-07-10 PROCEDURE — 3074F SYST BP LT 130 MM HG: CPT | Mod: CPTII,S$GLB,, | Performed by: FAMILY MEDICINE

## 2023-07-10 PROCEDURE — 3066F PR DOCUMENTATION OF TREATMENT FOR NEPHROPATHY: ICD-10-PCS | Mod: CPTII,S$GLB,, | Performed by: FAMILY MEDICINE

## 2023-07-10 PROCEDURE — 3008F PR BODY MASS INDEX (BMI) DOCUMENTED: ICD-10-PCS | Mod: CPTII,S$GLB,, | Performed by: FAMILY MEDICINE

## 2023-07-10 PROCEDURE — 1101F PT FALLS ASSESS-DOCD LE1/YR: CPT | Mod: CPTII,S$GLB,, | Performed by: FAMILY MEDICINE

## 2023-07-10 PROCEDURE — 99214 OFFICE O/P EST MOD 30 MIN: CPT | Mod: S$GLB,,, | Performed by: FAMILY MEDICINE

## 2023-07-10 PROCEDURE — 3061F NEG MICROALBUMINURIA REV: CPT | Mod: CPTII,S$GLB,, | Performed by: FAMILY MEDICINE

## 2023-07-10 PROCEDURE — 3078F PR MOST RECENT DIASTOLIC BLOOD PRESSURE < 80 MM HG: ICD-10-PCS | Mod: CPTII,S$GLB,, | Performed by: FAMILY MEDICINE

## 2023-07-10 PROCEDURE — 3078F DIAST BP <80 MM HG: CPT | Mod: CPTII,S$GLB,, | Performed by: FAMILY MEDICINE

## 2023-07-10 PROCEDURE — 3044F PR MOST RECENT HEMOGLOBIN A1C LEVEL <7.0%: ICD-10-PCS | Mod: CPTII,S$GLB,, | Performed by: FAMILY MEDICINE

## 2023-07-10 PROCEDURE — 99999 PR PBB SHADOW E&M-EST. PATIENT-LVL IV: ICD-10-PCS | Mod: PBBFAC,,, | Performed by: FAMILY MEDICINE

## 2023-07-10 PROCEDURE — 3061F PR NEG MICROALBUMINURIA RESULT DOCUMENTED/REVIEW: ICD-10-PCS | Mod: CPTII,S$GLB,, | Performed by: FAMILY MEDICINE

## 2023-07-10 PROCEDURE — 3288F FALL RISK ASSESSMENT DOCD: CPT | Mod: CPTII,S$GLB,, | Performed by: FAMILY MEDICINE

## 2023-07-10 PROCEDURE — 1126F AMNT PAIN NOTED NONE PRSNT: CPT | Mod: CPTII,S$GLB,, | Performed by: FAMILY MEDICINE

## 2023-07-10 PROCEDURE — 3044F HG A1C LEVEL LT 7.0%: CPT | Mod: CPTII,S$GLB,, | Performed by: FAMILY MEDICINE

## 2023-07-10 PROCEDURE — 4010F ACE/ARB THERAPY RXD/TAKEN: CPT | Mod: CPTII,S$GLB,, | Performed by: FAMILY MEDICINE

## 2023-07-10 PROCEDURE — 3008F BODY MASS INDEX DOCD: CPT | Mod: CPTII,S$GLB,, | Performed by: FAMILY MEDICINE

## 2023-07-10 PROCEDURE — 3072F PR LOW RISK FOR RETINOPATHY: ICD-10-PCS | Mod: CPTII,S$GLB,, | Performed by: FAMILY MEDICINE

## 2023-07-10 PROCEDURE — 3288F PR FALLS RISK ASSESSMENT DOCUMENTED: ICD-10-PCS | Mod: CPTII,S$GLB,, | Performed by: FAMILY MEDICINE

## 2023-07-10 PROCEDURE — 1101F PR PT FALLS ASSESS DOC 0-1 FALLS W/OUT INJ PAST YR: ICD-10-PCS | Mod: CPTII,S$GLB,, | Performed by: FAMILY MEDICINE

## 2023-07-10 PROCEDURE — 1160F PR REVIEW ALL MEDS BY PRESCRIBER/CLIN PHARMACIST DOCUMENTED: ICD-10-PCS | Mod: CPTII,S$GLB,, | Performed by: FAMILY MEDICINE

## 2023-07-10 PROCEDURE — 1160F RVW MEDS BY RX/DR IN RCRD: CPT | Mod: CPTII,S$GLB,, | Performed by: FAMILY MEDICINE

## 2023-07-10 PROCEDURE — 99999 PR PBB SHADOW E&M-EST. PATIENT-LVL IV: CPT | Mod: PBBFAC,,, | Performed by: FAMILY MEDICINE

## 2023-07-10 PROCEDURE — 1159F PR MEDICATION LIST DOCUMENTED IN MEDICAL RECORD: ICD-10-PCS | Mod: CPTII,S$GLB,, | Performed by: FAMILY MEDICINE

## 2023-07-10 PROCEDURE — 3066F NEPHROPATHY DOC TX: CPT | Mod: CPTII,S$GLB,, | Performed by: FAMILY MEDICINE

## 2023-07-10 PROCEDURE — 3074F PR MOST RECENT SYSTOLIC BLOOD PRESSURE < 130 MM HG: ICD-10-PCS | Mod: CPTII,S$GLB,, | Performed by: FAMILY MEDICINE

## 2023-07-10 PROCEDURE — 1159F MED LIST DOCD IN RCRD: CPT | Mod: CPTII,S$GLB,, | Performed by: FAMILY MEDICINE

## 2023-07-10 PROCEDURE — 99214 PR OFFICE/OUTPT VISIT, EST, LEVL IV, 30-39 MIN: ICD-10-PCS | Mod: S$GLB,,, | Performed by: FAMILY MEDICINE

## 2023-07-10 PROCEDURE — 1126F PR PAIN SEVERITY QUANTIFIED, NO PAIN PRESENT: ICD-10-PCS | Mod: CPTII,S$GLB,, | Performed by: FAMILY MEDICINE

## 2023-07-10 RX ORDER — METFORMIN HYDROCHLORIDE 500 MG/1
1000 TABLET ORAL 2 TIMES DAILY WITH MEALS
Qty: 360 TABLET | Refills: 2 | Status: SHIPPED | OUTPATIENT
Start: 2023-07-10 | End: 2024-02-17

## 2023-07-10 RX ORDER — AMLODIPINE BESYLATE AND BENAZEPRIL HYDROCHLORIDE 2.5; 1 MG/1; MG/1
1 CAPSULE ORAL DAILY
Qty: 90 CAPSULE | Refills: 2 | Status: SHIPPED | OUTPATIENT
Start: 2023-07-10 | End: 2023-09-24

## 2023-07-10 RX ORDER — ATORVASTATIN CALCIUM 40 MG/1
40 TABLET, FILM COATED ORAL NIGHTLY
Qty: 90 TABLET | Refills: 2 | Status: SHIPPED | OUTPATIENT
Start: 2023-07-10 | End: 2023-12-11

## 2023-07-10 NOTE — ASSESSMENT & PLAN NOTE
Diabetes Management Status    Statin: Taking  ACE/ARB: Taking    Screening or Prevention Patient's value Goal Complete/Controlled?   HgA1C Testing and Control   Lab Results   Component Value Date    HGBA1C 5.3 07/03/2023      Annually/Less than 8% Yes   Lipid profile : 07/03/2023 Annually Yes   LDL control Lab Results   Component Value Date    LDLCALC 82.8 07/03/2023    Annually/Less than 100 mg/dl  Yes   Nephropathy screening Lab Results   Component Value Date    LABMICR 10.0 01/03/2023     Lab Results   Component Value Date    PROTEINUA Negative 05/26/2020    Annually Yes   Blood pressure BP Readings from Last 1 Encounters:   07/10/23 122/70    Less than 140/90 Yes   Dilated retinal exam : 08/22/2022 Annually Yes   Foot exam   : 03/14/2023 Annually Yes     Lab Results   Component Value Date    HGBA1C 5.3 07/03/2023    HGBA1C 5.5 01/03/2023    HGBA1C 5.4 06/20/2022    EGFRNORACEVR >60.0 07/03/2023    MICALBCREAT 12.2 01/03/2023    LDLCALC 82.8 07/03/2023     No results found for: GLUTAMICACID, CPEPTIDE   Last 5 Patient Entered Readings                                          Most Recent A1c:     There is no flowsheet data to display.        HEALTH MAINTENANCE: Diabetic health maintenance interventions reviewed and are up to date except for:  There are no preventive care reminders to display for this patient.

## 2023-07-10 NOTE — PROGRESS NOTES
"OFFICE VISIT 7/10/23 10:20 AM CDT    Subjective   CHIEF COMPLAINT: Follow-up (6m)    Refer to the history and data embedded within the "Assessment & Plan" section below for the status of the conditions evaluated and managed today. Chronic conditions are represented as and appear to be compensated/controlled and stable. No new complaints or concerns reported.       Review of Systems   Respiratory:  Negative for chest tightness and shortness of breath.    Cardiovascular:  Negative for chest pain.   Endocrine: Negative for polydipsia and polyuria.       Objective   Vitals:    07/10/23 1025   BP: 122/70   BP Location: Right arm   Patient Position: Sitting   BP Method: Large (Manual)   Pulse: 98   Resp: 17   Temp: 97.7 °F (36.5 °C)   TempSrc: Tympanic   SpO2: 99%   Weight: 77.1 kg (169 lb 15.6 oz)   Height: 5' 3" (1.6 m)   Physical Exam  Vitals reviewed.   Constitutional:       General: She is not in acute distress.     Appearance: Normal appearance. She is not ill-appearing or diaphoretic.   Cardiovascular:      Rate and Rhythm: Normal rate and regular rhythm.   Pulmonary:      Effort: Pulmonary effort is normal.      Breath sounds: Normal breath sounds.   Skin:     General: Skin is warm and dry.   Neurological:      Mental Status: She is alert and oriented to person, place, and time. Mental status is at baseline.   Psychiatric:         Mood and Affect: Mood normal.         Behavior: Behavior normal.         Judgment: Judgment normal.        Assessment and Plan   1. Hyperlipidemia associated with type 2 diabetes mellitus  -     atorvastatin (LIPITOR) 40 MG tablet; Take 1 tablet (40 mg total) by mouth every evening.  Dispense: 90 tablet; Refill: 2    2. Hypertension associated with diabetes  Comments:  Pt BP is well controlled  Assessment & Plan:  BP Readings from Last 6 Encounters:   07/10/23 122/70   05/09/23 120/70   04/25/23 122/66   03/21/23 124/75   03/14/23 118/64   01/09/23 134/72     Lab Results   Component " Value Date    EGFRNORACEVR >60.0 07/03/2023    CREATININE 0.6 07/03/2023    BUN 13 07/03/2023    K 4.3 07/03/2023     07/03/2023     07/03/2023     Results for orders placed or performed in visit on 12/24/19   SCHEDULED EKG 12-LEAD (to Muse)    Collection Time: 12/24/19 10:23 AM    Narrative    Test Reason : R42,    Vent. Rate : 073 BPM     Atrial Rate : 073 BPM     P-R Int : 176 ms          QRS Dur : 100 ms      QT Int : 398 ms       P-R-T Axes : 046 000 028 degrees     QTc Int : 438 ms    Normal sinus rhythm  Anterolateral infarct ,age undetermined  Abnormal ECG  No previous ECGs available  Confirmed by KARL ELIZABETH MD ([id number]) on 12/24/2019 11:48:30 AM    Referred By: ROBBIE BRENNER           Confirmed By:KARL ELIZABETH MD        Orders:  -     amlodipine-benazepril 2.5-10 mg (LOTREL) 2.5-10 mg per capsule; Take 1 capsule by mouth once daily.  Dispense: 90 capsule; Refill: 2    3. Type 2 diabetes mellitus with other specified complication, without long-term current use of insulin  Assessment & Plan:  Diabetes Management Status    Statin: Taking  ACE/ARB: Taking    Screening or Prevention Patient's value Goal Complete/Controlled?   HgA1C Testing and Control   Lab Results   Component Value Date    HGBA1C 5.3 07/03/2023      Annually/Less than 8% Yes   Lipid profile : 07/03/2023 Annually Yes   LDL control Lab Results   Component Value Date    LDLCALC 82.8 07/03/2023    Annually/Less than 100 mg/dl  Yes   Nephropathy screening Lab Results   Component Value Date    LABMICR 10.0 01/03/2023     Lab Results   Component Value Date    PROTEINUA Negative 05/26/2020    Annually Yes   Blood pressure BP Readings from Last 1 Encounters:   07/10/23 122/70    Less than 140/90 Yes   Dilated retinal exam : 08/22/2022 Annually Yes   Foot exam   : 03/14/2023 Annually Yes     Lab Results   Component Value Date    HGBA1C 5.3 07/03/2023    HGBA1C 5.5 01/03/2023    HGBA1C 5.4 06/20/2022    EGFRNORACEVR >60.0 07/03/2023    MICALBCREAT  12.2 01/03/2023    LDLCALC 82.8 07/03/2023     No results found for: GLUTAMICACID, CPEPTIDE   Last 5 Patient Entered Readings                                          Most Recent A1c:     There is no flowsheet data to display.        HEALTH MAINTENANCE: Diabetic health maintenance interventions reviewed and are up to date except for:  There are no preventive care reminders to display for this patient.     Orders:  -     metFORMIN (GLUCOPHAGE) 500 MG tablet; Take 2 tablets (1,000 mg total) by mouth 2 (two) times daily with meals.  Dispense: 360 tablet; Refill: 2  -     semaglutide (OZEMPIC) 0.25 mg or 0.5 mg (2 mg/3 mL) pen injector; Inject 0.5 mg into the skin every 7 days.  Dispense: 9 mL; Refill: 1  -     Hemoglobin A1C; Future; Expected date: 12/08/2023  -     Microalbumin/Creatinine Ratio, Urine; Future; Expected date: 12/08/2023    4. Type 2 diabetes mellitus with diabetic polyneuropathy, without long-term current use of insulin  Assessment & Plan:  Diabetes Management Status    Statin: Taking  ACE/ARB: Taking    Screening or Prevention Patient's value Goal Complete/Controlled?   HgA1C Testing and Control   Lab Results   Component Value Date    HGBA1C 5.3 07/03/2023      Annually/Less than 8% Yes   Lipid profile : 07/03/2023 Annually Yes   LDL control Lab Results   Component Value Date    LDLCALC 82.8 07/03/2023    Annually/Less than 100 mg/dl  Yes   Nephropathy screening Lab Results   Component Value Date    LABMICR 10.0 01/03/2023     Lab Results   Component Value Date    PROTEINUA Negative 05/26/2020    Annually Yes   Blood pressure BP Readings from Last 1 Encounters:   07/10/23 122/70    Less than 140/90 Yes   Dilated retinal exam : 08/22/2022 Annually Yes   Foot exam   : 03/14/2023 Annually Yes     Lab Results   Component Value Date    HGBA1C 5.3 07/03/2023    HGBA1C 5.5 01/03/2023    HGBA1C 5.4 06/20/2022    EGFRNORACEVR >60.0 07/03/2023    MICALBCREAT 12.2 01/03/2023    LDLCALC 82.8 07/03/2023     No  "results found for: GLUTAMICACID, CPEPTIDE   Last 5 Patient Entered Readings                                          Most Recent A1c:     There is no flowsheet data to display.        HEALTH MAINTENANCE: Diabetic health maintenance interventions reviewed and are up to date except for:  There are no preventive care reminders to display for this patient.     Orders:  -     metFORMIN (GLUCOPHAGE) 500 MG tablet; Take 2 tablets (1,000 mg total) by mouth 2 (two) times daily with meals.  Dispense: 360 tablet; Refill: 2  -     semaglutide (OZEMPIC) 0.25 mg or 0.5 mg (2 mg/3 mL) pen injector; Inject 0.5 mg into the skin every 7 days.  Dispense: 9 mL; Refill: 1  -     Hemoglobin A1C; Future; Expected date: 12/08/2023  -     Microalbumin/Creatinine Ratio, Urine; Future; Expected date: 12/08/2023    Unless noted herein, any chronic conditions are represented as and appear stable, and no other significant complaints or concerns were reported.    Follow up in about 5 months (around 12/15/2023) for review test results, discuss treatment plan, re-evaluation.   Future Appointments   Date Time Provider Department Center   7/25/2023  1:50 PM [facility]  MAMMO1 SCREEN [facility] MAMMO [place] [facility]   7/25/2023  2:50 PM Gwen Krishnan MD [facility] OBGYN [facility]   12/11/2023  8:40 AM JAMES Lane MD [facility]        Documentation entered by me for this encounter may have been done in part using speech-recognition technology. Although I have made an effort to ensure accuracy, "sound like" errors may exist and should be interpreted in context.  "

## 2023-07-10 NOTE — ASSESSMENT & PLAN NOTE
BP Readings from Last 6 Encounters:   07/10/23 122/70   05/09/23 120/70   04/25/23 122/66   03/21/23 124/75   03/14/23 118/64   01/09/23 134/72     Lab Results   Component Value Date    EGFRNORACEVR >60.0 07/03/2023    CREATININE 0.6 07/03/2023    BUN 13 07/03/2023    K 4.3 07/03/2023     07/03/2023     07/03/2023     Results for orders placed or performed in visit on 12/24/19   SCHEDULED EKG 12-LEAD (to Muse)    Collection Time: 12/24/19 10:23 AM    Narrative    Test Reason : R42,    Vent. Rate : 073 BPM     Atrial Rate : 073 BPM     P-R Int : 176 ms          QRS Dur : 100 ms      QT Int : 398 ms       P-R-T Axes : 046 000 028 degrees     QTc Int : 438 ms    Normal sinus rhythm  Anterolateral infarct ,age undetermined  Abnormal ECG  No previous ECGs available  Confirmed by KARL ELIZABETH MD ([id number]) on 12/24/2019 11:48:30 AM    Referred By: ROBBIE BRENNER           Confirmed By:KARL ELIZABETH MD       Negative

## 2023-08-22 ENCOUNTER — OFFICE VISIT (OUTPATIENT)
Dept: OPHTHALMOLOGY | Facility: CLINIC | Age: 68
End: 2023-08-22
Payer: MEDICARE

## 2023-08-22 DIAGNOSIS — E11.36 DIABETIC CATARACT: ICD-10-CM

## 2023-08-22 DIAGNOSIS — E11.9 DIABETES MELLITUS WITHOUT COMPLICATION: Primary | ICD-10-CM

## 2023-08-22 DIAGNOSIS — H52.03 HYPEROPIA WITH PRESBYOPIA, BILATERAL: ICD-10-CM

## 2023-08-22 DIAGNOSIS — H40.013 OAG (OPEN ANGLE GLAUCOMA) SUSPECT, LOW RISK, BILATERAL: ICD-10-CM

## 2023-08-22 DIAGNOSIS — H52.4 HYPEROPIA WITH PRESBYOPIA, BILATERAL: ICD-10-CM

## 2023-08-22 PROCEDURE — 2023F PR DILATED RETINAL EXAM W/O EVID OF RETINOPATHY: ICD-10-PCS | Mod: CPTII,S$GLB,, | Performed by: OPTOMETRIST

## 2023-08-22 PROCEDURE — 1159F MED LIST DOCD IN RCRD: CPT | Mod: CPTII,S$GLB,, | Performed by: OPTOMETRIST

## 2023-08-22 PROCEDURE — 3061F NEG MICROALBUMINURIA REV: CPT | Mod: CPTII,S$GLB,, | Performed by: OPTOMETRIST

## 2023-08-22 PROCEDURE — 4010F PR ACE/ARB THEARPY RXD/TAKEN: ICD-10-PCS | Mod: CPTII,S$GLB,, | Performed by: OPTOMETRIST

## 2023-08-22 PROCEDURE — 99999 PR PBB SHADOW E&M-EST. PATIENT-LVL III: ICD-10-PCS | Mod: PBBFAC,,, | Performed by: OPTOMETRIST

## 2023-08-22 PROCEDURE — 1126F PR PAIN SEVERITY QUANTIFIED, NO PAIN PRESENT: ICD-10-PCS | Mod: CPTII,S$GLB,, | Performed by: OPTOMETRIST

## 2023-08-22 PROCEDURE — 3066F PR DOCUMENTATION OF TREATMENT FOR NEPHROPATHY: ICD-10-PCS | Mod: CPTII,S$GLB,, | Performed by: OPTOMETRIST

## 2023-08-22 PROCEDURE — 3061F PR NEG MICROALBUMINURIA RESULT DOCUMENTED/REVIEW: ICD-10-PCS | Mod: CPTII,S$GLB,, | Performed by: OPTOMETRIST

## 2023-08-22 PROCEDURE — 3044F HG A1C LEVEL LT 7.0%: CPT | Mod: CPTII,S$GLB,, | Performed by: OPTOMETRIST

## 2023-08-22 PROCEDURE — 3044F PR MOST RECENT HEMOGLOBIN A1C LEVEL <7.0%: ICD-10-PCS | Mod: CPTII,S$GLB,, | Performed by: OPTOMETRIST

## 2023-08-22 PROCEDURE — 2023F DILAT RTA XM W/O RTNOPTHY: CPT | Mod: CPTII,S$GLB,, | Performed by: OPTOMETRIST

## 2023-08-22 PROCEDURE — 4010F ACE/ARB THERAPY RXD/TAKEN: CPT | Mod: CPTII,S$GLB,, | Performed by: OPTOMETRIST

## 2023-08-22 PROCEDURE — 3066F NEPHROPATHY DOC TX: CPT | Mod: CPTII,S$GLB,, | Performed by: OPTOMETRIST

## 2023-08-22 PROCEDURE — 92014 COMPRE OPH EXAM EST PT 1/>: CPT | Mod: S$GLB,,, | Performed by: OPTOMETRIST

## 2023-08-22 PROCEDURE — 1126F AMNT PAIN NOTED NONE PRSNT: CPT | Mod: CPTII,S$GLB,, | Performed by: OPTOMETRIST

## 2023-08-22 PROCEDURE — 99999 PR PBB SHADOW E&M-EST. PATIENT-LVL III: CPT | Mod: PBBFAC,,, | Performed by: OPTOMETRIST

## 2023-08-22 PROCEDURE — 92014 PR EYE EXAM, EST PATIENT,COMPREHESV: ICD-10-PCS | Mod: S$GLB,,, | Performed by: OPTOMETRIST

## 2023-08-22 PROCEDURE — 1159F PR MEDICATION LIST DOCUMENTED IN MEDICAL RECORD: ICD-10-PCS | Mod: CPTII,S$GLB,, | Performed by: OPTOMETRIST

## 2023-08-22 NOTE — PROGRESS NOTES
HPI     Annual Exam            Comments: New patient to [facility].  Patient reports for routine eye exam.          Diabetic Eye Exam            Comments: Lab Results       Component                Value               Date                       HGBA1C                   5.3                 07/03/2023                     Comments    Diabetic eye exam  Diagnosed with diabetes in 2011  Recent vision fluctuations none  PCP Dr. Lane  Vision changes since last eye exam?: Some      Any eye pain today: No    Other ocular symptoms: Patient states last week she noticed some wavy   lines OU    Interested in contact lens fitting today? No                  Last edited by Rhoda Gaxiola MA on 8/22/2023  1:07 PM.            Assessment /Plan     For exam results, see Encounter Report.    Diabetes mellitus without complication  12 years, last A1c 5.3 There was no diabetic retinopathy present on either eye on examination today. Recommend good blood pressure control, strict blood glucose control, and good cholesterol control.  Continue close care with Dr. Lane regarding diabetes.    Diabetic cataract  Visually significant cataract.  Patient is at the option stage.   Cataract surgery will improve vision, but necessity is dependent on patient's symptoms.   Pt declines surgical consult at this time.  Will continue to monitor over the next 12 months. Pt to call or RTC with any significant change in vision prior to next visit.     OAG (open angle glaucoma) suspect, low risk, bilateral  The patient has the following Glaucoma risk factors: Optic Nerve Asymmetry   Recommend annual observation with gOCT.     Hyperopia with presbyopia, bilateral  Eyeglass Final Rx       Eyeglass Final Rx         Sphere Cylinder Axis Add    Right +3.00 +0.75 023 +2.50    Left +3.00 +0.75 007 +2.50      Type: PAL    Expiration Date: 8/22/2024   PD-60                     RTC 1 yr for dilated eye exam or sooner if any changes to vision.   Discussed above and  answered questions.

## 2023-09-23 DIAGNOSIS — E11.59 HYPERTENSION ASSOCIATED WITH DIABETES: Chronic | ICD-10-CM

## 2023-09-23 DIAGNOSIS — I15.2 HYPERTENSION ASSOCIATED WITH DIABETES: Chronic | ICD-10-CM

## 2023-09-23 NOTE — TELEPHONE ENCOUNTER
No care due was identified.  [facility] Embedded Care Due Messages. Reference number: [id number].   9/23/2023 8:59:35 AM CDT

## 2023-09-24 RX ORDER — AMLODIPINE BESYLATE AND BENAZEPRIL HYDROCHLORIDE 2.5; 1 MG/1; MG/1
1 CAPSULE ORAL DAILY
Qty: 90 CAPSULE | Refills: 3 | Status: SHIPPED | OUTPATIENT
Start: 2023-09-24

## 2023-09-24 NOTE — TELEPHONE ENCOUNTER
Refill Decision Note   May Jan  is requesting a refill authorization.  Brief Assessment and Rationale for Refill:  Approve     Medication Therapy Plan:         Comments:     Note composed:3:49 AM 09/24/2023

## 2023-10-02 ENCOUNTER — OFFICE VISIT (OUTPATIENT)
Dept: INTERNAL MEDICINE | Facility: CLINIC | Age: 68
End: 2023-10-02
Payer: MEDICARE

## 2023-10-02 VITALS
RESPIRATION RATE: 20 BRPM | BODY MASS INDEX: 29.88 KG/M2 | WEIGHT: 168.63 LBS | DIASTOLIC BLOOD PRESSURE: 78 MMHG | HEIGHT: 63 IN | OXYGEN SATURATION: 95 % | SYSTOLIC BLOOD PRESSURE: 120 MMHG | TEMPERATURE: 98 F | HEART RATE: 61 BPM

## 2023-10-02 DIAGNOSIS — L60.0 PARONYCHIA OF TOE OF RIGHT FOOT DUE TO INGROWN TOENAIL: ICD-10-CM

## 2023-10-02 DIAGNOSIS — R26.89 ABNORMALITY OF GAIT DUE TO IMPAIRMENT OF BALANCE: Primary | Chronic | ICD-10-CM

## 2023-10-02 DIAGNOSIS — L03.031 PARONYCHIA OF TOE OF RIGHT FOOT DUE TO INGROWN TOENAIL: ICD-10-CM

## 2023-10-02 DIAGNOSIS — E11.42 TYPE 2 DIABETES MELLITUS WITH DIABETIC POLYNEUROPATHY, WITHOUT LONG-TERM CURRENT USE OF INSULIN: Chronic | ICD-10-CM

## 2023-10-02 DIAGNOSIS — Z23 NEED FOR INFLUENZA VACCINATION: ICD-10-CM

## 2023-10-02 PROCEDURE — 1159F MED LIST DOCD IN RCRD: CPT | Mod: CPTII,S$GLB,, | Performed by: FAMILY MEDICINE

## 2023-10-02 PROCEDURE — 99214 PR OFFICE/OUTPT VISIT, EST, LEVL IV, 30-39 MIN: ICD-10-PCS | Mod: 25,S$GLB,, | Performed by: FAMILY MEDICINE

## 2023-10-02 PROCEDURE — 4010F PR ACE/ARB THEARPY RXD/TAKEN: ICD-10-PCS | Mod: CPTII,S$GLB,, | Performed by: FAMILY MEDICINE

## 2023-10-02 PROCEDURE — 99999 PR PBB SHADOW E&M-EST. PATIENT-LVL V: CPT | Mod: PBBFAC,,, | Performed by: FAMILY MEDICINE

## 2023-10-02 PROCEDURE — 3074F PR MOST RECENT SYSTOLIC BLOOD PRESSURE < 130 MM HG: ICD-10-PCS | Mod: CPTII,S$GLB,, | Performed by: FAMILY MEDICINE

## 2023-10-02 PROCEDURE — 1160F PR REVIEW ALL MEDS BY PRESCRIBER/CLIN PHARMACIST DOCUMENTED: ICD-10-PCS | Mod: CPTII,S$GLB,, | Performed by: FAMILY MEDICINE

## 2023-10-02 PROCEDURE — 90694 FLU VACCINE - QUADRIVALENT - ADJUVANTED: ICD-10-PCS | Mod: S$GLB,,, | Performed by: FAMILY MEDICINE

## 2023-10-02 PROCEDURE — G0008 FLU VACCINE - QUADRIVALENT - ADJUVANTED: ICD-10-PCS | Mod: S$GLB,,, | Performed by: FAMILY MEDICINE

## 2023-10-02 PROCEDURE — 1125F AMNT PAIN NOTED PAIN PRSNT: CPT | Mod: CPTII,S$GLB,, | Performed by: FAMILY MEDICINE

## 2023-10-02 PROCEDURE — 1101F PT FALLS ASSESS-DOCD LE1/YR: CPT | Mod: CPTII,S$GLB,, | Performed by: FAMILY MEDICINE

## 2023-10-02 PROCEDURE — 3061F PR NEG MICROALBUMINURIA RESULT DOCUMENTED/REVIEW: ICD-10-PCS | Mod: CPTII,S$GLB,, | Performed by: FAMILY MEDICINE

## 2023-10-02 PROCEDURE — 1160F RVW MEDS BY RX/DR IN RCRD: CPT | Mod: CPTII,S$GLB,, | Performed by: FAMILY MEDICINE

## 2023-10-02 PROCEDURE — 3288F PR FALLS RISK ASSESSMENT DOCUMENTED: ICD-10-PCS | Mod: CPTII,S$GLB,, | Performed by: FAMILY MEDICINE

## 2023-10-02 PROCEDURE — 3074F SYST BP LT 130 MM HG: CPT | Mod: CPTII,S$GLB,, | Performed by: FAMILY MEDICINE

## 2023-10-02 PROCEDURE — 1101F PR PT FALLS ASSESS DOC 0-1 FALLS W/OUT INJ PAST YR: ICD-10-PCS | Mod: CPTII,S$GLB,, | Performed by: FAMILY MEDICINE

## 2023-10-02 PROCEDURE — 3078F PR MOST RECENT DIASTOLIC BLOOD PRESSURE < 80 MM HG: ICD-10-PCS | Mod: CPTII,S$GLB,, | Performed by: FAMILY MEDICINE

## 2023-10-02 PROCEDURE — G0008 ADMIN INFLUENZA VIRUS VAC: HCPCS | Mod: S$GLB,,, | Performed by: FAMILY MEDICINE

## 2023-10-02 PROCEDURE — 99999 PR PBB SHADOW E&M-EST. PATIENT-LVL V: ICD-10-PCS | Mod: PBBFAC,,, | Performed by: FAMILY MEDICINE

## 2023-10-02 PROCEDURE — 3061F NEG MICROALBUMINURIA REV: CPT | Mod: CPTII,S$GLB,, | Performed by: FAMILY MEDICINE

## 2023-10-02 PROCEDURE — 4010F ACE/ARB THERAPY RXD/TAKEN: CPT | Mod: CPTII,S$GLB,, | Performed by: FAMILY MEDICINE

## 2023-10-02 PROCEDURE — 3078F DIAST BP <80 MM HG: CPT | Mod: CPTII,S$GLB,, | Performed by: FAMILY MEDICINE

## 2023-10-02 PROCEDURE — 3008F PR BODY MASS INDEX (BMI) DOCUMENTED: ICD-10-PCS | Mod: CPTII,S$GLB,, | Performed by: FAMILY MEDICINE

## 2023-10-02 PROCEDURE — 3008F BODY MASS INDEX DOCD: CPT | Mod: CPTII,S$GLB,, | Performed by: FAMILY MEDICINE

## 2023-10-02 PROCEDURE — 1125F PR PAIN SEVERITY QUANTIFIED, PAIN PRESENT: ICD-10-PCS | Mod: CPTII,S$GLB,, | Performed by: FAMILY MEDICINE

## 2023-10-02 PROCEDURE — 3066F NEPHROPATHY DOC TX: CPT | Mod: CPTII,S$GLB,, | Performed by: FAMILY MEDICINE

## 2023-10-02 PROCEDURE — 1159F PR MEDICATION LIST DOCUMENTED IN MEDICAL RECORD: ICD-10-PCS | Mod: CPTII,S$GLB,, | Performed by: FAMILY MEDICINE

## 2023-10-02 PROCEDURE — 99214 OFFICE O/P EST MOD 30 MIN: CPT | Mod: 25,S$GLB,, | Performed by: FAMILY MEDICINE

## 2023-10-02 PROCEDURE — 3044F HG A1C LEVEL LT 7.0%: CPT | Mod: CPTII,S$GLB,, | Performed by: FAMILY MEDICINE

## 2023-10-02 PROCEDURE — 3288F FALL RISK ASSESSMENT DOCD: CPT | Mod: CPTII,S$GLB,, | Performed by: FAMILY MEDICINE

## 2023-10-02 PROCEDURE — 90694 VACC AIIV4 NO PRSRV 0.5ML IM: CPT | Mod: S$GLB,,, | Performed by: FAMILY MEDICINE

## 2023-10-02 PROCEDURE — 3066F PR DOCUMENTATION OF TREATMENT FOR NEPHROPATHY: ICD-10-PCS | Mod: CPTII,S$GLB,, | Performed by: FAMILY MEDICINE

## 2023-10-02 PROCEDURE — 3044F PR MOST RECENT HEMOGLOBIN A1C LEVEL <7.0%: ICD-10-PCS | Mod: CPTII,S$GLB,, | Performed by: FAMILY MEDICINE

## 2023-10-02 RX ORDER — MUPIROCIN 20 MG/G
OINTMENT TOPICAL 2 TIMES DAILY
Qty: 22 G | Refills: 0 | Status: SHIPPED | OUTPATIENT
Start: 2023-10-02 | End: 2023-10-16

## 2023-10-02 NOTE — ASSESSMENT & PLAN NOTE
Over the last few months she has had a sensation of being off-balance, which she described as when she is walking normally, she will occasionally veer off to one side unintentionally. She has not fallen, but she is concerned that she is at risk for falling. She says that the veering can be to the right or the left. She reports she has had a limp ever since knee replacement has left her with her left leg slightly shorter than the right. On exam, her gate appears normal, but her heel to toe gate reveals ataxia mildly worse than expected for age and deconditioning.

## 2023-10-02 NOTE — ASSESSMENT & PLAN NOTE
DIABETIC FOOT EXAM:  Protective Sensation (w/ 10 gram monofilament):  Right: Decreased, but present  Left: Intact    Visual Inspection:  Normal -  Bilateral    Pedal Pulses:   Right: Present  Left: Present    Posterior Tibialis Pulses:   Right:Present  Left: Present

## 2023-10-02 NOTE — ASSESSMENT & PLAN NOTE
"Started 2 weeks ago. Treated with local nail care and hygiene and Epsom salt soaks. She says "is 90% better now," but still is tender. No draining.  "

## 2023-10-02 NOTE — PROGRESS NOTES
"OFFICE VISIT 10/2/23 10:20 AM CDT    CHIEF COMPLAINT: Off Balance    1. Abnormality of gait due to impairment of balance  Assessment & Plan:  Over the last few months she has had a sensation of being off-balance, which she described as when she is walking normally, she will occasionally veer off to one side unintentionally. She has not fallen, but she is concerned that she is at risk for falling. She says that the veering can be to the right or the left. She reports she has had a limp ever since knee replacement has left her with her left leg slightly shorter than the right. On exam, her gate appears normal, but her heel to toe gate reveals ataxia mildly worse than expected for age and deconditioning.    Orders:  -     Ambulatory referral/consult to Physical/Occupational Therapy; Future; Expected date: 10/09/2023    2. Paronychia of right great toe due to ingrown toenail  Assessment & Plan:  Started 2 weeks ago. Treated with local nail care and hygiene and Epsom salt soaks. She says "is 90% better now," but still is tender. No draining.    Orders:  -     Ambulatory referral/consult to Podiatry; Future; Expected date: 10/09/2023  -     mupirocin (BACTROBAN) 2 % ointment; Apply topically 2 (two) times daily. for 14 days  Dispense: 22 g; Refill: 0    3. Type 2 diabetes mellitus with diabetic polyneuropathy, without long-term current use of insulin  Assessment & Plan:  DIABETIC FOOT EXAM:  Protective Sensation (w/ 10 gram monofilament):  Right: Decreased, but present  Left: Intact    Visual Inspection:  Normal -  Bilateral    Pedal Pulses:   Right: Present  Left: Present    Posterior Tibialis Pulses:   Right:Present  Left: Present       Orders:  -     Ambulatory referral/consult to Podiatry; Future; Expected date: 10/09/2023    4. Need for influenza vaccination  -     Influenza - Quadrivalent (Adjuvanted)    Unless noted herein, any chronic conditions are represented as and appear stable, and no other significant " "complaints or concerns were reported.    Follow up in about 10 weeks (around 12/11/2023) for re-evaluation.   Future Appointments   Date Time Provider Department Center   12/4/2023  8:10 AM LABORATORY, [facility]   12/11/2023  8:40 AM JAMES Lane MD [facility]   7/30/2024  1:10 PM [facility]  MAMMO1 SCREEN [facility] MAMMO [facility]   7/30/2024  2:00 PM Gwen Krishnan MD [facility]YN [place] [facility]       Review of Systems   Respiratory:  Negative for chest tightness and shortness of breath.    Cardiovascular:  Negative for chest pain.   Endocrine: Negative for polydipsia and polyuria.   Neurological:  Negative for dizziness, vertigo, syncope, facial asymmetry and light-headedness.     Vitals:    10/02/23 1033   BP: 120/78   BP Location: Left arm   Patient Position: Sitting   BP Method: Large (Manual)   Pulse: 61   Resp: 20   Temp: 97.9 °F (36.6 °C)   SpO2: 95%   Weight: 76.5 kg (168 lb 10.4 oz)   Height: 5' 3" (1.6 m)   Physical Exam  Vitals reviewed.   Constitutional:       General: She is not in acute distress.     Appearance: Normal appearance. She is not ill-appearing or diaphoretic.   Neck:      Vascular: No carotid bruit.   Cardiovascular:      Rate and Rhythm: Normal rate and regular rhythm.   Pulmonary:      Effort: Pulmonary effort is normal.      Breath sounds: Normal breath sounds.   Skin:     General: Skin is warm and dry.   Neurological:      General: No focal deficit present.      Mental Status: She is alert and oriented to person, place, and time. Mental status is at baseline.      Cranial Nerves: No cranial nerve deficit.      Motor: No weakness, tremor, atrophy, abnormal muscle tone or pronator drift.      Coordination: Romberg sign negative. Coordination normal. Finger-Nose-Finger Test normal.      Gait: Tandem walk abnormal. Gait normal.      Deep Tendon Reflexes: Reflexes abnormal.   Psychiatric:         Mood and Affect: Mood normal.         Behavior: Behavior normal.         Judgment: " "Judgment normal.   Documentation entered by me for this encounter may have been done in part using speech-recognition technology. Although I have made an effort to ensure accuracy, "sound like" errors may exist and should be interpreted in context.  "

## 2023-10-04 ENCOUNTER — CLINICAL SUPPORT (OUTPATIENT)
Dept: REHABILITATION | Facility: HOSPITAL | Age: 68
End: 2023-10-04
Attending: FAMILY MEDICINE
Payer: MEDICARE

## 2023-10-04 DIAGNOSIS — R26.89 ABNORMALITY OF GAIT DUE TO IMPAIRMENT OF BALANCE: Chronic | ICD-10-CM

## 2023-10-04 DIAGNOSIS — Z74.09 IMPAIRED FUNCTIONAL MOBILITY, BALANCE, GAIT, AND ENDURANCE: ICD-10-CM

## 2023-10-04 PROCEDURE — 97162 PT EVAL MOD COMPLEX 30 MIN: CPT
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
## 2023-10-04 NOTE — PATIENT INSTRUCTIONS
HOME EXERCISE PROGRAM  Created by Viktoriya Tijerina PT, [place]  Mar 9th, 2023 View videos at [email or web address]     Total 5      BALANCE - SINGLE LEG    Start by standing with your back to a corner and a chair in front of you for a handhold. Lift up one foot and balance on the other foot. Stand upright, keep your hips level to the ground, and keep your gaze ahead. Hold this position for 10-20 seconds then relax. Switch feet and repeat. Also remember to not let your hip drop during this balancing exercise. Repeat 2 Times   Hold 10 Seconds   Complete 1 Set   Perform 1 Times a Day            BALANCE - TANDEM    Start by standing with your back to a corner and a chair in front of you for a handhold. Line up your feet in a heel-toe position. Stand upright, keeping your gaze ahead. Hold this position for 30 seconds then relax. Switch feet and repeat. Repeat 1 Time   Hold 30 Seconds   Complete 1 Set   Perform 1 Times a Day            BALANCE - ROMBERG    Start by standing with your back to a corner and a chair in front of you for a handhold. Bring your feet together and stand upright, keeping your gaze ahead. Hold this position for 1 minute then relax.    Progress with:  - Head turns/nods 5x's each  - Arm lifts/reaches 5x's each  - Stand on foam cushion or folded towel Repeat 1 Time   Hold 1 Minute   Complete 1 Set   Perform 1 Times a Day            LATERAL STEPS WITH SUPPORT - TABLE - COUNTERTOP     front of a sturdy table or countertop. Take side steps and use the table or countertop for support. Walk about 8 - 10 steps in each direction.  - Repeat 3x's in each direction  - Can progress with head turns and arm reach     Complete 3 Sets   Perform 1 Times a Day            Standing Backward Pivot Step  - Stand facing countertop with both hands on counter for support.  -Then slowly take a step backward with Right foot while rotating your body to the right  - Then step back facing the counter.    - After 8 repetitions, repeat  with L side. Repeat 8 Times   Complete 1 Set   Perform 1 Times a Day

## 2023-10-10 ENCOUNTER — CLINICAL SUPPORT (OUTPATIENT)
Dept: REHABILITATION | Facility: HOSPITAL | Age: 68
End: 2023-10-10
Attending: FAMILY MEDICINE
Payer: MEDICARE

## 2023-10-10 DIAGNOSIS — Z74.09 IMPAIRED FUNCTIONAL MOBILITY, BALANCE, GAIT, AND ENDURANCE: Primary | ICD-10-CM

## 2023-10-10 PROCEDURE — 97110 THERAPEUTIC EXERCISES: CPT | Mod: CQ

## 2023-10-10 PROCEDURE — 97112 NEUROMUSCULAR REEDUCATION: CPT | Mod: CQ

## 2023-10-10 NOTE — PROGRESS NOTES
[facility]   Physical Therapy Treatment Note        Name: May Montoya  Clinic Number: [id number]    Therapy Diagnosis:   Encounter Diagnosis   Name Primary?    Impaired functional mobility, balance, gait, and endurance Yes     Physician: JAMES Lane MD    Visit Date: 10/10/2023    Physician Orders: PT Eval and Treat   Medical Diagnosis from Referral: R26.89 (ICD-10-CM) - Abnormality of gait due to impairment of balance  Evaluation Date: 10/4/2023  Authorization Period Expiration: 12/31/23  Plan of Care Expiration: 11/28/23  Progress Note Due: 11/4/23  Visit # / Visits authorized: 1/20 (+ evaluation)  FOTO: 0/3 time constraints     Precautions: Standard and Falls  PTA Visit #: 1/5     Time In: 1530  Time Out: 1615  Total Billable Time: 45 minutes (Billing reflects 1 on 1 treatment time spent with patient)    Subjective     Patient reports: complaints of neuropathy in her right foot. Recently has experienced losses of balance.     She was partially compliant with home exercise program.    Response to previous treatment: felt okay after evaluation    Functional change: decreased standing tolerance and decreased balance    Pain: 0/10     Location: no pain    Objective      Objective Measures updated at progress report or POC update only unless otherwise noted.       Treatment     May received the treatments listed below:         THERAPEUTIC EXERCISES to develop strength, endurance, ROM, flexibility, posture, and core stabilization for (20) minutes including:    Intervention Performed Today    nustep x 6 minutes    Straight leg raise  x 3 x 10 bilateral    Bridges  x 3 x 10   clams x 3 x 10 supine red band alternating lower extremities                         Plan for Next Visit:        NEUROMUSCULAR RE-EDUCATION ACTIVITIES to improve Balance, Coordination, Kinesthetic, Sense, Proprioception, and Posture for (25) minutes.  The following were included:    Intervention Performed Today     Side to side steps x 5 laps   Heel raises x 3 x 10 1 hand support parallel bars    Toe raises x 3  x 10 1 hand support parallel bars    March in place  x  x 10 1 hand support parallel bars    Walking forward/backward      Posterior pelvic tilt  x 3 minutes                Plan for Next Visit:          Patient Education and Home Exercises       Home Exercises Provided and Patient Education Provided     Education provided: (during session) minutes  PURPOSE: Patient educated on the impairments noted above and the effects of physical therapy intervention to improve overall condition and QOL.   EXERCISE: Patient was educated on all the above exercise prior/during/after for proper posture, positioning, and execution for safe performance with home exercise program.   STRENGTH: Patient educated on the importance of improved core and extremity strength in order to improve alignment of the spine and extremities with static positions and dynamic movement.     Written Home Exercises Provided: yes.  Exercises were reviewed and May was able to demonstrate them prior to the end of the session.  May demonstrated good  understanding of the education provided. See EMR under Patient Instructions for exercises provided during therapy sessions.    Assessment     Patient is noted to have decreased core, hip, and lower extremity strength. She appears to be unaware these deficits and exercises with poor quality once fatigued. She may benefit from additional core strengthening as well as lower extremity and hip strengthening to assist with improving her balance. Patient denies fatigue but also is noted to have compensatory movements during exercises.    May is progressing well towards her goals.   Patient prognosis is Good.     Patient will continue to benefit from skilled outpatient physical therapy to address the deficits listed in the problem list box on initial evaluation, provide pt/family education and to maximize patient's  level of independence in the home and community environment.     Patient's spiritual, cultural and educational needs considered and pt agreeable to plan of care and goals.     Anticipated Barriers for therapy: co-morbidities and sedentary lifestyle       GOALS:     Short Term Goals:  4 weeks Progress    Function: Patient will demonstrate improved function as indicated by a functional limitation score improved by 3 points from initial measure.  PC   Strength: Patient will demonstrate improved strength by performing 5x sit to  10 seconds in order to progress towards independence with functional activities.  PC   Balance: Complete FGA and set goals accordingly.  PC   HEP: Patient will demonstrate independence with HEP in order to progress toward functional independence.              Long Term Goals:  8 weeks Progress   Function: Patient will demonstrate improved function as indicated by a functional limitation score improved 5 points from initial measure.  PC   Strength: Patient will demonstrate improved strength of hip flexors to 4/5 bilaterally for functional mobility and balance. PC   Balance: Complete FGA and set goals accordingly.  PC   HEP: Patient educated on HEP in preparation for d/c verbalizing and demonstrating good understanding of topics discussed.   PC              Plan     Continue Plan of Care (POC) and progress per patient tolerance. See treatment section for details on planned progressions next session.      Arvin Tay, PTA

## 2023-10-17 ENCOUNTER — CLINICAL SUPPORT (OUTPATIENT)
Dept: REHABILITATION | Facility: HOSPITAL | Age: 68
End: 2023-10-17
Payer: MEDICARE

## 2023-10-17 ENCOUNTER — DOCUMENTATION ONLY (OUTPATIENT)
Dept: REHABILITATION | Facility: HOSPITAL | Age: 68
End: 2023-10-17

## 2023-10-17 DIAGNOSIS — Z74.09 IMPAIRED FUNCTIONAL MOBILITY, BALANCE, GAIT, AND ENDURANCE: Primary | ICD-10-CM

## 2023-10-17 PROCEDURE — 97112 NEUROMUSCULAR REEDUCATION: CPT | Mod: CQ

## 2023-10-17 PROCEDURE — 97110 THERAPEUTIC EXERCISES: CPT | Mod: CQ

## 2023-10-17 NOTE — PROGRESS NOTES
PT/PTA met face to face to discuss pt's treatment plan and progress towards established goals. Pt will be seen by a physical therapist minimally every 6th visit or every 30 days.        Arvin Tay PTA

## 2023-10-17 NOTE — PROGRESS NOTES
[facility]   Physical Therapy Treatment Note        Name: May Montoya  Clinic Number: [id number]    Therapy Diagnosis:   Encounter Diagnosis   Name Primary?    Impaired functional mobility, balance, gait, and endurance Yes       Physician: JAMES Lane MD    Visit Date: 10/17/2023    Physician Orders: PT Eval and Treat   Medical Diagnosis from Referral: R26.89 (ICD-10-CM) - Abnormality of gait due to impairment of balance  Evaluation Date: 10/4/2023  Authorization Period Expiration: 12/31/23  Plan of Care Expiration: 11/28/23  Progress Note Due: 11/4/23  Visit # / Visits authorized: 2/20 (+ evaluation)  FOTO: 0/3 time constraints     Precautions: Standard and Falls  PTA Visit #: 2/5     Time In: 1555 (late arrival)  Time Out: 1530  Total Billable Time: 35 minutes (Billing reflects 1 on 1 treatment time spent with patient)    Subjective     Patient reports: complaints of tight left knee with initial exercise bike activity.     She was partially compliant with home exercise program.    Response to previous treatment: felt okay after last session     Functional change: decreased standing tolerance and minimally improved balance    Pain: 0/10     Location: no pain    Objective      Objective Measures updated at progress report or POC update only unless otherwise noted.       Treatment     May received the treatments listed below:         THERAPEUTIC EXERCISES to develop strength, endurance, ROM, flexibility, posture, and core stabilization for (22) minutes including:    Intervention Performed Today    nustep  6 minutes    Exercise bike x 6 minutes    Straight leg raise  x 3 x 10 bilateral    Bridges  x 3 x 10 2 seconds    clams x 3 x 10 side lying red band (progressed)                          Plan for Next Visit:        NEUROMUSCULAR RE-EDUCATION ACTIVITIES to improve Balance, Coordination, Kinesthetic, Sense, Proprioception, and Posture for (13) minutes.  The following were  included:    Intervention Performed Today    Side to side steps x 5 laps   Heel raises x 3 x 10 1 hand support parallel bars    Toe raises  3  x 10 1 hand support parallel bars    March in place  x 3 x 10 2 finger support on clinic step rails   Walking forward/backward      Posterior pelvic tilt   3 minutes         Hip abduction  x 3 x 10 bilateral 2 finger support on clinic step rails     Plan for Next Visit:          Patient Education and Home Exercises       Home Exercises Provided and Patient Education Provided     Education provided: (during session) minutes  PURPOSE: Patient educated on the impairments noted above and the effects of physical therapy intervention to improve overall condition and QUALITY OF LIFE .   EXERCISE: Patient was educated on all the above exercise prior/during/after for proper posture, positioning, and execution for safe performance with home exercise program.   STRENGTH: Patient educated on the importance of improved core and extremity strength in order to improve alignment of the spine and extremities with static positions and dynamic movement.     Written Home Exercises Provided: yes.  Exercises were reviewed and May was able to demonstrate them prior to the end of the session.  May demonstrated good  understanding of the education provided. See EMR under Patient Instructions for exercises provided during therapy sessions.    Assessment     Initially Patient reports left knee pain with the exercise bike but after 2 minutes her knee pain decreased. She may benefit from additional core strengthening as well as lower extremity and hip strengthening to assist with improving her balance. Patient requires cues for maintaining her core stability while performing exercises in standing.    May is progressing well towards her goals.   Patient prognosis is Good.     Patient will continue to benefit from skilled outpatient physical therapy to address the deficits listed in the problem list  box on initial evaluation, provide pt/family education and to maximize patient's level of independence in the home and community environment.     Patient's spiritual, cultural and educational needs considered and pt agreeable to plan of care and goals.     Anticipated Barriers for therapy: co-morbidities and sedentary lifestyle       GOALS:     Short Term Goals:  4 weeks Progress    Function: Patient will demonstrate improved function as indicated by a functional limitation score improved by 3 points from initial measure.  PC   Strength: Patient will demonstrate improved strength by performing 5x sit to  10 seconds in order to progress towards independence with functional activities.  PC   Balance: Complete FGA and set goals accordingly.  PC   HEP: Patient will demonstrate independence with HEP in order to progress toward functional independence.              Long Term Goals:  8 weeks Progress   Function: Patient will demonstrate improved function as indicated by a functional limitation score improved 5 points from initial measure.  PC   Strength: Patient will demonstrate improved strength of hip flexors to 4/5 bilaterally for functional mobility and balance. PC   Balance: Complete FGA and set goals accordingly.  PC   HEP: Patient educated on HEP in preparation for d/c verbalizing and demonstrating good understanding of topics discussed.   PC              Plan     Continue Plan of Care (POC) and progress per patient tolerance. See treatment section for details on planned progressions next session.      Arvin Tay, PTA

## 2023-10-23 ENCOUNTER — CLINICAL SUPPORT (OUTPATIENT)
Dept: REHABILITATION | Facility: HOSPITAL | Age: 68
End: 2023-10-23
Payer: MEDICARE

## 2023-10-23 DIAGNOSIS — Z74.09 IMPAIRED FUNCTIONAL MOBILITY, BALANCE, GAIT, AND ENDURANCE: Primary | ICD-10-CM

## 2023-10-23 PROCEDURE — 97112 NEUROMUSCULAR REEDUCATION: CPT

## 2023-10-23 PROCEDURE — 97110 THERAPEUTIC EXERCISES: CPT

## 2023-10-23 NOTE — PROGRESS NOTES
[facility]   Physical Therapy Treatment Note        Name: May Montoya  Clinic Number: [id number]    Therapy Diagnosis:   Encounter Diagnosis   Name Primary?    Impaired functional mobility, balance, gait, and endurance Yes       Physician: JAMES Lane MD    Visit Date: 10/23/2023    Physician Orders: PT Eval and Treat   Medical Diagnosis from Referral: R26.89 (ICD-10-CM) - Abnormality of gait due to impairment of balance  Evaluation Date: 10/4/2023  Authorization Period Expiration: 12/31/23  Plan of Care Expiration: 11/28/23  Progress Note Due: 11/4/23  Visit # / Visits authorized: 3/20 (+ evaluation)  FOTO: 0/3 time constraints     Precautions: Standard and Falls  PTA Visit #: 0/5     Time In: 1215  Time Out: 1315  Total Billable Time: 60 minutes (Billing reflects 1 on 1 treatment time spent with patient)    Subjective     Patient reports: that she feels a combination of the concussion and previous fracture have both resulted in the current balance issues.    She was partially compliant with home exercise program.    Response to previous treatment: felt okay after last session     Functional change: decreased standing tolerance and minimally improved balance    Pain: 0/10     Location: no pain    Objective      Objective Measures updated at progress report or POC update only unless otherwise noted.       Treatment     May received the treatments listed below:         THERAPEUTIC EXERCISES to develop strength, endurance, ROM, flexibility, posture, and core stabilization for (30) minutes including:    Intervention Performed Today    nustep x 5 minutes    Exercise bike x 4 minutes    Straight leg raise   3 x 10 bilateral    Bridges   3 x 10 2 seconds    clams  3 x 10 side lying red band (progressed)    Supine X  x Pelvic tilts - 1x10  Abdominal bracing with knees to chest - 1x10   Sit to stand from chair - no UE x 2x10               Plan for Next Visit:        NEUROMUSCULAR  RE-EDUCATION ACTIVITIES to improve Balance, Coordination, Kinesthetic, Sense, Proprioception, and Posture for (30) minutes.  The following were included:    Intervention Performed Today    Gait/agility X  X  X  x Sidesteps - 2 passes  Carioca - 2 passes  Large forward steps - 2 passes  Backward steps - 2 passes   Standing exercises X  X  X  x Hip flex - 3x10  Hip Abd - 3x10  Hip Extn - 3x10  Single leg heel raises - 3x10   Toe raises x 3  x 10 1 hand support parallel bars    March in place   3 x 10 2 finger support on clinic step rails   Walking forward/backward      Posterior pelvic tilt   3 minutes    Tandem stance with hand touches x 3x10   Hip abduction   3 x 10 bilateral 2 finger support on clinic step rails     Plan for Next Visit:          Patient Education and Home Exercises       Home Exercises Provided and Patient Education Provided     Education provided: (during session) minutes  PURPOSE: Patient educated on the impairments noted above and the effects of physical therapy intervention to improve overall condition and QUALITY OF LIFE .   EXERCISE: Patient was educated on all the above exercise prior/during/after for proper posture, positioning, and execution for safe performance with home exercise program.   STRENGTH: Patient educated on the importance of improved core and extremity strength in order to improve alignment of the spine and extremities with static positions and dynamic movement.     Written Home Exercises Provided: yes.  Exercises were reviewed and May was able to demonstrate them prior to the end of the session.  May demonstrated good  understanding of the education provided. See EMR under Patient Instructions for exercises provided during therapy sessions.    Assessment     Patient reports that she continues to experience a bit of balance deficits.  Worked on pelvic tilts and abdominal bracing.  Also worked on abdominal bracing with hip exercises.  Asked the patient to work on these  exercises at home.  Also worked on wider base of support when initially standing from sitting.  That will increase balance by preventing Center of gravity from exceeding the boundary of base of support.    May is progressing well towards her goals.   Patient prognosis is Good.     Patient will continue to benefit from skilled outpatient physical therapy to address the deficits listed in the problem list box on initial evaluation, provide pt/family education and to maximize patient's level of independence in the home and community environment.     Patient's spiritual, cultural and educational needs considered and pt agreeable to plan of care and goals.     Anticipated Barriers for therapy: co-morbidities and sedentary lifestyle       GOALS:     Short Term Goals:  4 weeks Progress    Function: Patient will demonstrate improved function as indicated by a functional limitation score improved by 3 points from initial measure.  PC   Strength: Patient will demonstrate improved strength by performing 5x sit to  10 seconds in order to progress towards independence with functional activities.  PC   Balance: Complete FGA and set goals accordingly.  PC   HEP: Patient will demonstrate independence with HEP in order to progress toward functional independence.              Long Term Goals:  8 weeks Progress   Function: Patient will demonstrate improved function as indicated by a functional limitation score improved 5 points from initial measure.  PC   Strength: Patient will demonstrate improved strength of hip flexors to 4/5 bilaterally for functional mobility and balance. PC   Balance: Complete FGA and set goals accordingly.  PC   HEP: Patient educated on HEP in preparation for d/c verbalizing and demonstrating good understanding of topics discussed.   PC              Plan     Continue Plan of Care (POC) and progress per patient tolerance. See treatment section for details on planned progressions next  session.      Alfa Salcedo, PT

## 2023-10-30 ENCOUNTER — OFFICE VISIT (OUTPATIENT)
Dept: PODIATRY | Facility: CLINIC | Age: 68
End: 2023-10-30
Payer: MEDICARE

## 2023-10-30 ENCOUNTER — CLINICAL SUPPORT (OUTPATIENT)
Dept: REHABILITATION | Facility: HOSPITAL | Age: 68
End: 2023-10-30
Payer: MEDICARE

## 2023-10-30 VITALS — WEIGHT: 168.63 LBS | HEIGHT: 63 IN | BODY MASS INDEX: 29.88 KG/M2

## 2023-10-30 DIAGNOSIS — L03.031 PARONYCHIA OF TOE OF RIGHT FOOT DUE TO INGROWN TOENAIL: Primary | ICD-10-CM

## 2023-10-30 DIAGNOSIS — Z74.09 IMPAIRED FUNCTIONAL MOBILITY, BALANCE, GAIT, AND ENDURANCE: Primary | ICD-10-CM

## 2023-10-30 DIAGNOSIS — L60.0 PARONYCHIA OF TOE OF RIGHT FOOT DUE TO INGROWN TOENAIL: Primary | ICD-10-CM

## 2023-10-30 DIAGNOSIS — E11.9 ENCOUNTER FOR COMPREHENSIVE DIABETIC FOOT EXAMINATION, TYPE 2 DIABETES MELLITUS: ICD-10-CM

## 2023-10-30 DIAGNOSIS — E11.42 TYPE 2 DIABETES MELLITUS WITH DIABETIC POLYNEUROPATHY, WITHOUT LONG-TERM CURRENT USE OF INSULIN: Chronic | ICD-10-CM

## 2023-10-30 PROCEDURE — 97110 THERAPEUTIC EXERCISES: CPT

## 2023-10-30 PROCEDURE — 4010F PR ACE/ARB THEARPY RXD/TAKEN: ICD-10-PCS | Mod: CPTII,S$GLB,, | Performed by: PODIATRIST

## 2023-10-30 PROCEDURE — 1126F AMNT PAIN NOTED NONE PRSNT: CPT | Mod: CPTII,S$GLB,, | Performed by: PODIATRIST

## 2023-10-30 PROCEDURE — 1126F PR PAIN SEVERITY QUANTIFIED, NO PAIN PRESENT: ICD-10-PCS | Mod: CPTII,S$GLB,, | Performed by: PODIATRIST

## 2023-10-30 PROCEDURE — 1160F RVW MEDS BY RX/DR IN RCRD: CPT | Mod: CPTII,S$GLB,, | Performed by: PODIATRIST

## 2023-10-30 PROCEDURE — 3066F PR DOCUMENTATION OF TREATMENT FOR NEPHROPATHY: ICD-10-PCS | Mod: CPTII,S$GLB,, | Performed by: PODIATRIST

## 2023-10-30 PROCEDURE — 99213 OFFICE O/P EST LOW 20 MIN: CPT | Mod: 25,S$GLB,, | Performed by: PODIATRIST

## 2023-10-30 PROCEDURE — 1101F PT FALLS ASSESS-DOCD LE1/YR: CPT | Mod: CPTII,S$GLB,, | Performed by: PODIATRIST

## 2023-10-30 PROCEDURE — 3288F FALL RISK ASSESSMENT DOCD: CPT | Mod: CPTII,S$GLB,, | Performed by: PODIATRIST

## 2023-10-30 PROCEDURE — 3044F PR MOST RECENT HEMOGLOBIN A1C LEVEL <7.0%: ICD-10-PCS | Mod: CPTII,S$GLB,, | Performed by: PODIATRIST

## 2023-10-30 PROCEDURE — 1101F PR PT FALLS ASSESS DOC 0-1 FALLS W/OUT INJ PAST YR: ICD-10-PCS | Mod: CPTII,S$GLB,, | Performed by: PODIATRIST

## 2023-10-30 PROCEDURE — 99999 PR PBB SHADOW E&M-EST. PATIENT-LVL III: CPT | Mod: PBBFAC,,, | Performed by: PODIATRIST

## 2023-10-30 PROCEDURE — 3061F PR NEG MICROALBUMINURIA RESULT DOCUMENTED/REVIEW: ICD-10-PCS | Mod: CPTII,S$GLB,, | Performed by: PODIATRIST

## 2023-10-30 PROCEDURE — 1159F PR MEDICATION LIST DOCUMENTED IN MEDICAL RECORD: ICD-10-PCS | Mod: CPTII,S$GLB,, | Performed by: PODIATRIST

## 2023-10-30 PROCEDURE — 4010F ACE/ARB THERAPY RXD/TAKEN: CPT | Mod: CPTII,S$GLB,, | Performed by: PODIATRIST

## 2023-10-30 PROCEDURE — 3066F NEPHROPATHY DOC TX: CPT | Mod: CPTII,S$GLB,, | Performed by: PODIATRIST

## 2023-10-30 PROCEDURE — 3288F PR FALLS RISK ASSESSMENT DOCUMENTED: ICD-10-PCS | Mod: CPTII,S$GLB,, | Performed by: PODIATRIST

## 2023-10-30 PROCEDURE — 1160F PR REVIEW ALL MEDS BY PRESCRIBER/CLIN PHARMACIST DOCUMENTED: ICD-10-PCS | Mod: CPTII,S$GLB,, | Performed by: PODIATRIST

## 2023-10-30 PROCEDURE — 3008F BODY MASS INDEX DOCD: CPT | Mod: CPTII,S$GLB,, | Performed by: PODIATRIST

## 2023-10-30 PROCEDURE — 3061F NEG MICROALBUMINURIA REV: CPT | Mod: CPTII,S$GLB,, | Performed by: PODIATRIST

## 2023-10-30 PROCEDURE — 97530 THERAPEUTIC ACTIVITIES: CPT

## 2023-10-30 PROCEDURE — 1159F MED LIST DOCD IN RCRD: CPT | Mod: CPTII,S$GLB,, | Performed by: PODIATRIST

## 2023-10-30 PROCEDURE — 3044F HG A1C LEVEL LT 7.0%: CPT | Mod: CPTII,S$GLB,, | Performed by: PODIATRIST

## 2023-10-30 PROCEDURE — 99213 PR OFFICE/OUTPT VISIT, EST, LEVL III, 20-29 MIN: ICD-10-PCS | Mod: 25,S$GLB,, | Performed by: PODIATRIST

## 2023-10-30 PROCEDURE — 99999 PR PBB SHADOW E&M-EST. PATIENT-LVL III: ICD-10-PCS | Mod: PBBFAC,,, | Performed by: PODIATRIST

## 2023-10-30 PROCEDURE — 3008F PR BODY MASS INDEX (BMI) DOCUMENTED: ICD-10-PCS | Mod: CPTII,S$GLB,, | Performed by: PODIATRIST

## 2023-10-30 NOTE — PROGRESS NOTES
Subjective:     Patient ID: May Montoya is a 68 y.o. female.    Chief Complaint: Ingrown Toenail (C/o states right great toe, possible ingrown f/u,(before seeing PCP & antibiotics ointment  pain was 10/10) spoke w/ PCP and referral pt here. NO pain today!. (Diabetic pt, wearing casual shoes, last seen PCP Dr. Lane 10/02/23))    May is a 68 y.o. female who presents to the clinic upon referral from Dr. Lane  for evaluation and treatment of diabetic feet. May has a past medical history of Arthritis, Diabetes mellitus, Herniated disc, cervical, Hypertension, Immunocompromised state due to drug therapy (12/13/2021), Seronegative rheumatoid arthritis (12/13/2021), Type 2 diabetes mellitus with diabetic polyneuropathy, without long-term current use of insulin (3/29/2021), and Type 2 diabetes mellitus, without long-term current use of insulin (3/29/2021). Patient relates no major problem with feet. Only complaints today consist of possible ingrown toenail on right. Patient states PCP prescribed topical antibiotic ointment and it helped with pain and redness. Patient denies any drainage from toe. Patient has no other pedal complaints at this time.     PCP: JAMES Lane MD    Date Last Seen by PCP: 10/02/2023    Current shoe gear: Casual shoes    Hemoglobin A1C   Date Value Ref Range Status   07/03/2023 5.3 4.0 - 5.6 % Final     Comment:     ADA Screening Guidelines:  5.7-6.4%  Consistent with prediabetes  >or=6.5%  Consistent with diabetes    High levels of fetal hemoglobin interfere with the HbA1C  assay. Heterozygous hemoglobin variants (HbS, HgC, etc)do  not significantly interfere with this assay.   However, presence of multiple variants may affect accuracy.     01/03/2023 5.5 4.0 - 5.6 % Final     Comment:     ADA Screening Guidelines:  5.7-6.4%  Consistent with prediabetes  >or=6.5%  Consistent with diabetes    High levels of fetal hemoglobin interfere with the HbA1C  assay. Heterozygous  hemoglobin variants (HbS, HgC, etc)do  not significantly interfere with this assay.   However, presence of multiple variants may affect accuracy.     06/20/2022 5.4 4.0 - 5.6 % Final     Comment:     ADA Screening Guidelines:  5.7-6.4%  Consistent with prediabetes  >or=6.5%  Consistent with diabetes    High levels of fetal hemoglobin interfere with the HbA1C  assay. Heterozygous hemoglobin variants (HbS, HgC, etc)do  not significantly interfere with this assay.   However, presence of multiple variants may affect accuracy.           Patient Active Problem List   Diagnosis    Hypertension associated with diabetes    Hyperlipidemia associated with type 2 diabetes mellitus    Cervical stenosis of spine    Type 2 diabetes mellitus with diabetic polyneuropathy    Chronic venous hypertension involving legs, L > R    Generalized osteoarthritis of multiple sites    Primary osteoarthritis of both hands    Weight loss    Seronegative rheumatoid arthritis    Long term current use of immunosuppressive drug    Immunocompromised state due to drug therapy    History of COVID-19 (December, 2021)    Post-concussion syndrome    Paronychia of right great toe due to ingrown toenail    Abnormality of gait due to impairment of balance    Impaired functional mobility, balance, gait, and endurance       Medication List with Changes/Refills   Current Medications    AMLODIPINE-BENAZEPRIL 2.5-10 MG (LOTREL) 2.5-10 MG PER CAPSULE    Take 1 capsule by mouth once daily.    ASPIRIN (ECOTRIN) 81 MG EC TABLET    Take 81 mg by mouth once daily.    ATORVASTATIN (LIPITOR) 40 MG TABLET    Take 1 tablet (40 mg total) by mouth every evening.    BLOOD SUGAR DIAGNOSTIC STRP    Check blood glucose 1-2 times daily (BEFORE breakfast or other meal) and as needed for symptoms of low or high blood glucose    BLOOD-GLUCOSE METER KIT    Check blood glucose 1-2 times daily (BEFORE breakfast or other meal) and as needed for symptoms of low or high blood glucose     CELECOXIB (CELEBREX) 200 MG CAPSULE    Take 1 capsule (200 mg total) by mouth 2 (two) times daily.    CETIRIZINE (ZYRTEC) 5 MG TABLET    Take 5 mg by mouth once daily.    CHOLECALCIFEROL, VITAMIN D3, (VITAMIN D3) 50 MCG (2,000 UNIT) CAP    Take 1 capsule by mouth once daily.    FOLIC ACID (FOLVITE) 1 MG TABLET    Take 1 tablet (1 mg total) by mouth once daily.    GLUCOSAMINE/D3/BOSWELLIA MAYELIN (OSTEO BI-FLEX, 5-LOXIN, ORAL)    Take 2 tablets by mouth once daily.    LANCETS MISC    Check blood glucose 1-2 times daily (BEFORE breakfast or other meal) and as needed for symptoms of low or high blood glucose    METAXALONE (SKELAXIN) 800 MG TABLET    TAKE 1 TABLET TWICE DAILY AS NEEDED FOR SPASMS    METFORMIN (GLUCOPHAGE) 500 MG TABLET    Take 2 tablets (1,000 mg total) by mouth 2 (two) times daily with meals.    METHOTREXATE 2.5 MG TAB    Take 6 tablets (15 mg total) by mouth every 7 days.    SEMAGLUTIDE (OZEMPIC) 0.25 MG OR 0.5 MG (2 MG/3 ML) PEN INJECTOR    Inject 0.5 mg into the skin every 7 days.       Review of patient's allergies indicates:   Allergen Reactions    Avelox [moxifloxacin]        Past Surgical History:   Procedure Laterality Date    BREAST BIOPSY      BREAST SURGERY       SECTION      CHOLECYSTECTOMY      DILATION AND CURETTAGE OF UTERUS      GALLBLADDER SURGERY      HYSTEROSCOPY      JOINT REPLACEMENT      NECK SURGERY  2020    peripheral artery angioplasty      TOTAL KNEE ARTHROPLASTY      L    TUBAL LIGATION         Family History   Problem Relation Age of Onset    Diabetes Mother     Arthritis Mother     Hypertension Mother     Miscarriages / Stillbirths Mother        Social History     Socioeconomic History    Marital status:    Tobacco Use    Smoking status: Never    Smokeless tobacco: Never   Substance and Sexual Activity    Alcohol use: Yes     Comment: 1 or 2  [place] every 3 or 4 months    Drug use: No    Sexual activity: Not Currently     Birth control/protection:  "None     Social Determinants of Health     Stress: No Stress Concern Present (12/24/2019)    [employer] [place] [employer] - Occupational Stress Questionnaire     Feeling of Stress : Not at all       Vitals:    10/30/23 1454   Weight: 76.5 kg (168 lb 10.4 oz)   Height: 5' 3" (1.6 m)   PainSc: 0-No pain       Hemoglobin A1C   Date Value Ref Range Status   07/03/2023 5.3 4.0 - 5.6 % Final     Comment:     ADA Screening Guidelines:  5.7-6.4%  Consistent with prediabetes  >or=6.5%  Consistent with diabetes    High levels of fetal hemoglobin interfere with the HbA1C  assay. Heterozygous hemoglobin variants (HbS, HgC, etc)do  not significantly interfere with this assay.   However, presence of multiple variants may affect accuracy.     01/03/2023 5.5 4.0 - 5.6 % Final     Comment:     ADA Screening Guidelines:  5.7-6.4%  Consistent with prediabetes  >or=6.5%  Consistent with diabetes    High levels of fetal hemoglobin interfere with the HbA1C  assay. Heterozygous hemoglobin variants (HbS, HgC, etc)do  not significantly interfere with this assay.   However, presence of multiple variants may affect accuracy.     06/20/2022 5.4 4.0 - 5.6 % Final     Comment:     ADA Screening Guidelines:  5.7-6.4%  Consistent with prediabetes  >or=6.5%  Consistent with diabetes    High levels of fetal hemoglobin interfere with the HbA1C  assay. Heterozygous hemoglobin variants (HbS, HgC, etc)do  not significantly interfere with this assay.   However, presence of multiple variants may affect accuracy.         Review of Systems   Constitutional:  Negative for chills and fever.   Respiratory:  Negative for shortness of breath.    Cardiovascular:  Negative for chest pain, palpitations, orthopnea, claudication and leg swelling.   Gastrointestinal:  Negative for diarrhea, nausea and vomiting.   Musculoskeletal:  Negative for joint pain.   Skin:  Negative for rash.   Neurological:  Negative for dizziness, tingling, sensory change, focal " weakness and weakness.   Psychiatric/Behavioral: Negative.             Objective:      PHYSICAL EXAM: Apperance: Alert and orient in no distress,well developed, and with good attention to grooming and body habits  Patient presents ambulating in casual shoes.   LOWER EXTREMITY EXAM:  VASCULAR: Dorsalis pedis pulses 2/4 bilateral and Posterior Tibial pulses 2/4 bilateral. Capillary fill time <4 seconds bilateral. Mild edema observed bilateral. Varicosities absent bilateral. Skin temperature of the lower extremities is warm to warm, proximal to distal. Hair growth dim bilateral.  DERMATOLOGICAL: No skin rashes, subcutaneous nodules, lesions, or ulcers observed bilateral. Nails 1,2,3,4,5 bilateral normal length and thickness. Webspaces 1,2,3,4 bilateral clean, dry and without evidence of break in skin integrity. The right hallux  distal medial  nail fold is grossly edematous and firm from chronic inflammation and scarring. No purulent drainage, no odor, and no increased temperature observed to right hallux.   NEUROLOGICAL: Light touch, sharp-dull, proprioception all present and equal bilaterally.  Vibratory sensation diminished at bilateral hallux and navicular. Protective sensation intact at all 10 sites as tested with a Gays-Shelly 5.07 monofilament.   MUSCULOSKELETAL: Muscle strength is 5/5 for foot inverters, everters, plantarflexors, and dorsiflexors. Muscle tone is normal. Pes planus noted bilateral. No pain on palpation of right hallux medial nail border. No pain on palpation of dorsal nail plate right hallux.         Assessment:       ICD-10-CM ICD-9-CM   1. Paronychia of right great toe due to ingrown toenail  L03.031 681.11    L60.0 703.0   2. Type 2 diabetes mellitus with diabetic polyneuropathy, without long-term current use of insulin  E11.42 250.60     357.2   3. Encounter for comprehensive diabetic foot examination, type 2 diabetes mellitus  E11.9 250.00       Plan:   Paronychia of right great toe  due to ingrown toenail  -     Ambulatory referral/consult to Podiatry    Type 2 diabetes mellitus with diabetic polyneuropathy, without long-term current use of insulin  -     Ambulatory referral/consult to Podiatry    Encounter for comprehensive diabetic foot examination, type 2 diabetes mellitus      I counseled the patient on her conditions, regarding findings of my examination, my impressions, and usual treatment plan.   This visit spent on counseling and coordination of care.  Appointment spent on education about the diabetic foot, neuropathy, and prevention of limb loss.  Shoe inspection. Diabetic Foot Education. Patient reminded of the importance of good nutrition and blood sugar control to help prevent podiatric complications of diabetes. Patient instructed on proper foot hygeine. We discussed wearing proper shoe gear, daily foot inspections, never walking without protective shoe gear, never putting sharp instruments to feet.    Conservatively we did discuss proper nail cutting for incurvated toenails. Surgically we briefly discussed temporary vs. permanent nail avulsion procedures with patient. The patient elects for conservative management at this time.   Patient  will continue to monitor the areas daily, inspect feet, wear protective shoe gear when ambulatory, moisturizer to maintain skin integrity. Patient reminded of the importance of good nutrition and blood sugar control to help prevent podiatric complications of diabetes.  Patient to return 12 months or sooner if needed.          Sharnette Miles, DPM [facility]

## 2023-10-30 NOTE — PROGRESS NOTES
[facility]   Physical Therapy Treatment Note        Name: May Montoya  Clinic Number: [id number]    Therapy Diagnosis:   Encounter Diagnosis   Name Primary?    Impaired functional mobility, balance, gait, and endurance Yes       Physician: JAMES Lane MD    Visit Date: 10/30/2023    Physician Orders: PT Eval and Treat   Medical Diagnosis from Referral: R26.89 (ICD-10-CM) - Abnormality of gait due to impairment of balance  Evaluation Date: 10/4/2023  Authorization Period Expiration: 12/31/23  Plan of Care Expiration: 11/28/23  Progress Note Due: 11/4/23  Visit # / Visits authorized: 4/20 (+ evaluation)  FOTO: 0/3 time constraints     Precautions: Standard and Falls  PTA Visit #: 0/5     Time In: 1345  Time Out: 1445  Total Billable Time: 60 minutes (Billing reflects 1 on 1 treatment time spent with patient)    Subjective     Patient reports: that she does perform some exercises at home.  However, she does report of fear of falling preventing her from participating in other exercises.    She was partially compliant with home exercise program.    Response to previous treatment: felt okay after last session     Functional change: decreased standing tolerance and minimally improved balance    Pain: 0/10     Location: no pain    Objective      Objective Measures updated at progress report or POC update only unless otherwise noted.       Treatment     May received the treatments listed below:         THERAPEUTIC EXERCISES to develop strength, endurance, ROM, flexibility, posture, and core stabilization for (30) minutes including:    Intervention Performed Today    nustep x 5 minutes    Exercise bike x 4 minutes    Straight leg raise   3 x 10 bilateral    Bridges   3 x 10 2 seconds    clams  3 x 10 side lying red band (progressed)    Supine X  x Pelvic tilts - 1x10  Abdominal bracing with knees to chest - 1x10   Sit to stand from chair - no UE x 2x10               Plan for Next Visit:         NEUROMUSCULAR RE-EDUCATION ACTIVITIES to improve Balance, Coordination, Kinesthetic, Sense, Proprioception, and Posture for (30) minutes.  The following were included:    Intervention Performed Today    Gait/agility X  X  X  x Sidesteps - 2 passes  Carioca - 2 passes  Large forward steps - 2 passes  Backward steps - 2 passes   Standing exercises X  X  X  x Hip flex - 3x10  Hip Abd - 3x10  Hip Extn - 3x10  Single leg heel raises - 3x10   Toe raises x 3  x 10 1 hand support parallel bars    March in place   3 x 10 2 finger support on clinic step rails   Walking forward/backward      Posterior pelvic tilt   3 minutes    Tandem stance with hand touches x 3x10   Hip abduction   3 x 10 bilateral 2 finger support on clinic step rails     Plan for Next Visit:          Patient Education and Home Exercises       Home Exercises Provided and Patient Education Provided     Education provided: (during session) minutes  PURPOSE: Patient educated on the impairments noted above and the effects of physical therapy intervention to improve overall condition and QUALITY OF LIFE .   EXERCISE: Patient was educated on all the above exercise prior/during/after for proper posture, positioning, and execution for safe performance with home exercise program.   STRENGTH: Patient educated on the importance of improved core and extremity strength in order to improve alignment of the spine and extremities with static positions and dynamic movement.     Written Home Exercises Provided: yes.  Exercises were reviewed and May was able to demonstrate them prior to the end of the session.  May demonstrated good  understanding of the education provided. See EMR under Patient Instructions for exercises provided during therapy sessions.    Assessment     Patient reports that she continues to experience a bit of balance deficits.  Worked on pelvic tilts and abdominal bracing.  Also worked on abdominal bracing with hip exercises.  Asked the patient  to work on these exercises at home.  Also worked on wider base of support when initially standing from sitting.  That will increase balance by preventing Center of gravity from exceeding the boundary of base of support.    May is progressing well towards her goals.   Patient prognosis is Good.     Patient will continue to benefit from skilled outpatient physical therapy to address the deficits listed in the problem list box on initial evaluation, provide pt/family education and to maximize patient's level of independence in the home and community environment.     Patient's spiritual, cultural and educational needs considered and pt agreeable to plan of care and goals.     Anticipated Barriers for therapy: co-morbidities and sedentary lifestyle       GOALS:     Short Term Goals:  4 weeks Progress    Function: Patient will demonstrate improved function as indicated by a functional limitation score improved by 3 points from initial measure.  PC   Strength: Patient will demonstrate improved strength by performing 5x sit to  10 seconds in order to progress towards independence with functional activities.  PC   Balance: Complete FGA and set goals accordingly.  PC   HEP: Patient will demonstrate independence with HEP in order to progress toward functional independence.              Long Term Goals:  8 weeks Progress   Function: Patient will demonstrate improved function as indicated by a functional limitation score improved 5 points from initial measure.  PC   Strength: Patient will demonstrate improved strength of hip flexors to 4/5 bilaterally for functional mobility and balance. PC   Balance: Complete FGA and set goals accordingly.  PC   HEP: Patient educated on HEP in preparation for d/c verbalizing and demonstrating good understanding of topics discussed.   PC              Plan     Continue Plan of Care (POC) and progress per patient tolerance. See treatment section for details on planned progressions next  session.      Alfa Salcedo, PT

## 2023-11-13 ENCOUNTER — CLINICAL SUPPORT (OUTPATIENT)
Dept: REHABILITATION | Facility: HOSPITAL | Age: 68
End: 2023-11-13
Payer: MEDICARE

## 2023-11-13 DIAGNOSIS — Z74.09 IMPAIRED FUNCTIONAL MOBILITY, BALANCE, GAIT, AND ENDURANCE: Primary | ICD-10-CM

## 2023-11-13 DIAGNOSIS — E11.9 TYPE 2 DIABETES MELLITUS WITHOUT COMPLICATION, WITHOUT LONG-TERM CURRENT USE OF INSULIN: ICD-10-CM

## 2023-11-13 PROCEDURE — 97110 THERAPEUTIC EXERCISES: CPT

## 2023-11-13 PROCEDURE — 97112 NEUROMUSCULAR REEDUCATION: CPT

## 2023-11-13 RX ORDER — SEMAGLUTIDE 1.34 MG/ML
1 INJECTION, SOLUTION SUBCUTANEOUS
Qty: 9 ML | Refills: 3 | OUTPATIENT
Start: 2023-11-13 | End: 2024-02-11

## 2023-11-13 NOTE — PROGRESS NOTES
[facility]   Physical Therapy Treatment Note        Name: May Montoya  Clinic Number: [id number]    Therapy Diagnosis:   Encounter Diagnosis   Name Primary?    Impaired functional mobility, balance, gait, and endurance Yes       Physician: JAMES Lane MD    Visit Date: 11/13/2023    Physician Orders: PT Eval and Treat   Medical Diagnosis from Referral: R26.89 (ICD-10-CM) - Abnormality of gait due to impairment of balance  Evaluation Date: 10/4/2023  Authorization Period Expiration: 12/31/23  Plan of Care Expiration: 11/28/23  Progress Note Due: 11/4/23  Visit # / Visits authorized: 5/20 (+ evaluation)  FOTO: 0/3 time constraints  Precautions: Standard and Falls  PTA Visit #: 0/5     Time In: 1345  Time Out: 1445  Total Billable Time: 60 minutes (Billing reflects 1 on 1 treatment time spent with patient)    Subjective     Patient reports: that she does perform some exercises at home.  However, she does report of fear of falling preventing her from participating in other exercises.    She was partially compliant with home exercise program.    Response to previous treatment: felt okay after last session     Functional change: decreased standing tolerance and minimally improved balance    Pain: 0/10     Location: no pain    Objective      Objective Measures updated at progress report or POC update only unless otherwise noted.       Treatment     May received the treatments listed below:         THERAPEUTIC EXERCISES to develop strength, endurance, ROM, flexibility, posture, and core stabilization for (30) minutes including:    Intervention Performed Today    nustep  5 minutes    Exercise bike  4 minutes    Treadmill walking x 2.0 mph x 8 minutes   Straight leg raise   3 x 10 bilateral    Bridges   3 x 10 2 seconds    clams  3 x 10 side lying red band (progressed)    Supine X  x Pelvic tilts - 1x10  Abdominal bracing with knees to chest - 1x10   Sit to stand from chair - no UE  2x10    Mat exercises X  X  x Sidelying planks with clam shells - 2x10  Bridging with knee to chest - 2x10  Straight leg raises - 2x10   On and off the floor x    10# medicine ball throws with squat to  x 2x10     Plan for Next Visit:        NEUROMUSCULAR RE-EDUCATION ACTIVITIES to improve Balance, Coordination, Kinesthetic, Sense, Proprioception, and Posture for (30) minutes.  The following were included:    Intervention Performed Today    Gait/agility X  X  X  x Sidesteps - 2 passes  Carioca - 2 passes  Large forward steps - 2 passes  Backward steps - 2 passes   Standing exercises  Hip flex - 3x10  Hip Abd - 3x10  Hip Extn - 3x10  Single leg heel raises - 3x10   Toe raises  3  x 10 1 hand support parallel bars    March in place   3 x 10 2 finger support on clinic step rails   Walking forward/backward      Posterior pelvic tilt   3 minutes    Airex X  x 2x10 - Tandem stance with ball raises  sidesteps   Hip abduction   3 x 10 bilateral 2 finger support on clinic step rails   Stepping down off 6 inch box and maintaining balance on single leg x 2x10     Plan for Next Visit:          Patient Education and Home Exercises       Home Exercises Provided and Patient Education Provided     Education provided: (during session) minutes  PURPOSE: Patient educated on the impairments noted above and the effects of physical therapy intervention to improve overall condition and QUALITY OF LIFE .   EXERCISE: Patient was educated on all the above exercise prior/during/after for proper posture, positioning, and execution for safe performance with home exercise program.   STRENGTH: Patient educated on the importance of improved core and extremity strength in order to improve alignment of the spine and extremities with static positions and dynamic movement.     Written Home Exercises Provided: yes.  Exercises were reviewed and May was able to demonstrate them prior to the end of the session.  May demonstrated good  understanding  of the education provided. See EMR under Patient Instructions for exercises provided during therapy sessions.    Assessment     Patient reports that she continues to perform exercises at home.  Worked on quick reflexive balance activities today.  Difficulty with eccentric landing and balancing off 6 inch step.  Also with problems balancing in tandem stance on a narrow Airex pad.  Good performance with all strengthening exercises.  A bit apprehensive on and off the floor due to left total knee replacement.  However, there was good performance and no complaints of pain.    May is progressing well towards her goals.   Patient prognosis is Good.     Patient will continue to benefit from skilled outpatient physical therapy to address the deficits listed in the problem list box on initial evaluation, provide pt/family education and to maximize patient's level of independence in the home and community environment.     Patient's spiritual, cultural and educational needs considered and pt agreeable to plan of care and goals.     Anticipated Barriers for therapy: co-morbidities and sedentary lifestyle       GOALS:     Short Term Goals:  4 weeks Progress    Function: Patient will demonstrate improved function as indicated by a functional limitation score improved by 3 points from initial measure.  PC   Strength: Patient will demonstrate improved strength by performing 5x sit to  10 seconds in order to progress towards independence with functional activities.  PC   Balance: Complete FGA and set goals accordingly.  PC   HEP: Patient will demonstrate independence with HEP in order to progress toward functional independence.              Long Term Goals:  8 weeks Progress   Function: Patient will demonstrate improved function as indicated by a functional limitation score improved 5 points from initial measure.  PC   Strength: Patient will demonstrate improved strength of hip flexors to 4/5 bilaterally for functional  mobility and balance. PC   Balance: Complete FGA and set goals accordingly.  PC   HEP: Patient educated on HEP in preparation for d/c verbalizing and demonstrating good understanding of topics discussed.   PC              Plan     Continue Plan of Care (POC) and progress per patient tolerance. See treatment section for details on planned progressions next session.      Alfa Salcedo, PT

## 2023-11-13 NOTE — TELEPHONE ENCOUNTER
Care Due:                  Date            Visit Type   Department     Provider  --------------------------------------------------------------------------------                                EP -                              PRIMARY      [facility] INTER[facility]  Last Visit: 10-      CARE ([facility])   HEMANTH Lane                              EP -                              PRIMARY      [facility] INTERNAL  Next Visit: 12-      CARE ([facility])   HEMANTH Lane                                                            Last  Test          Frequency    Reason                     Performed    Due Date  --------------------------------------------------------------------------------    HBA1C.......  6 months...  metFORMIN, semaglutide...  07- 12-    [facility] Embedded Care Due Messages. Reference number: [id number].   11/13/2023 1:02:58 PM CST

## 2023-11-14 NOTE — TELEPHONE ENCOUNTER
Refill Decision Note   May Montoya  is requesting a refill authorization.  Brief Assessment and Rationale for Refill:  Quick Discontinue     Medication Therapy Plan:  Ozempic decreased to 0.5mg every 7 days (7/10/23); FLOS: Quick dc      Comments:     Note composed:9:22 PM 11/13/2023

## 2023-11-15 ENCOUNTER — DOCUMENT SCAN (OUTPATIENT)
Dept: HOME HEALTH SERVICES | Facility: HOSPITAL | Age: 68
End: 2023-11-15
Payer: MEDICARE

## 2023-12-04 ENCOUNTER — LAB VISIT (OUTPATIENT)
Dept: LAB | Facility: HOSPITAL | Age: 68
End: 2023-12-04
Attending: FAMILY MEDICINE
Payer: MEDICARE

## 2023-12-04 DIAGNOSIS — E11.42 TYPE 2 DIABETES MELLITUS WITH DIABETIC POLYNEUROPATHY, WITHOUT LONG-TERM CURRENT USE OF INSULIN: Chronic | ICD-10-CM

## 2023-12-04 DIAGNOSIS — E11.69 TYPE 2 DIABETES MELLITUS WITH OTHER SPECIFIED COMPLICATION, WITHOUT LONG-TERM CURRENT USE OF INSULIN: ICD-10-CM

## 2023-12-04 LAB
ALBUMIN/CREAT UR: 13.3 UG/MG (ref 0–30)
CREAT UR-MCNC: 98 MG/DL (ref 15–325)
MICROALBUMIN UR DL<=1MG/L-MCNC: 13 UG/ML

## 2023-12-04 PROCEDURE — 82043 UR ALBUMIN QUANTITATIVE: CPT | Performed by: FAMILY MEDICINE

## 2023-12-04 PROCEDURE — 83036 HEMOGLOBIN GLYCOSYLATED A1C: CPT | Performed by: FAMILY MEDICINE

## 2023-12-04 PROCEDURE — 36415 COLL VENOUS BLD VENIPUNCTURE: CPT | Performed by: FAMILY MEDICINE

## 2023-12-05 LAB
ESTIMATED AVG GLUCOSE: 108 MG/DL (ref 68–131)
HBA1C MFR BLD: 5.4 % (ref 4–5.6)

## 2023-12-10 NOTE — PROGRESS NOTES
Results to be addressed at upcoming appointment(s) already scheduled.  Future Appointments  12/11/2023 8:40 AM    JAMES Lane MD      [facility]             [place]  8/6/2024   12:50 PM   [facility]  MAMMO1 SCREEN        [facility]          [place] [facility]  8/6/2024   2:00 PM    Gwen Krishnan MD              [facility]          [place] [facility]

## 2023-12-11 ENCOUNTER — HOSPITAL ENCOUNTER (OUTPATIENT)
Dept: RADIOLOGY | Facility: HOSPITAL | Age: 68
Discharge: HOME | End: 2023-12-11
Attending: FAMILY MEDICINE
Payer: MEDICARE

## 2023-12-11 ENCOUNTER — OFFICE VISIT (OUTPATIENT)
Dept: INTERNAL MEDICINE | Facility: CLINIC | Age: 68
End: 2023-12-11
Payer: MEDICARE

## 2023-12-11 VITALS
OXYGEN SATURATION: 98 % | BODY MASS INDEX: 29.77 KG/M2 | SYSTOLIC BLOOD PRESSURE: 120 MMHG | HEIGHT: 63 IN | WEIGHT: 168 LBS | RESPIRATION RATE: 16 BRPM | TEMPERATURE: 97 F | HEART RATE: 107 BPM | DIASTOLIC BLOOD PRESSURE: 72 MMHG

## 2023-12-11 DIAGNOSIS — E11.69 HYPERLIPIDEMIA ASSOCIATED WITH TYPE 2 DIABETES MELLITUS: Chronic | ICD-10-CM

## 2023-12-11 DIAGNOSIS — R05.1 ACUTE COUGH: ICD-10-CM

## 2023-12-11 DIAGNOSIS — J00 NASOPHARYNGITIS: ICD-10-CM

## 2023-12-11 DIAGNOSIS — E11.42 TYPE 2 DIABETES MELLITUS WITH DIABETIC POLYNEUROPATHY, WITHOUT LONG-TERM CURRENT USE OF INSULIN: Chronic | ICD-10-CM

## 2023-12-11 DIAGNOSIS — E11.59 HYPERTENSION ASSOCIATED WITH DIABETES: Chronic | ICD-10-CM

## 2023-12-11 DIAGNOSIS — J30.2 SEASONAL ALLERGIES: ICD-10-CM

## 2023-12-11 DIAGNOSIS — I15.2 HYPERTENSION ASSOCIATED WITH DIABETES: Chronic | ICD-10-CM

## 2023-12-11 DIAGNOSIS — E78.5 HYPERLIPIDEMIA ASSOCIATED WITH TYPE 2 DIABETES MELLITUS: Chronic | ICD-10-CM

## 2023-12-11 DIAGNOSIS — Z20.822 SUSPECTED COVID-19 VIRUS INFECTION: Primary | ICD-10-CM

## 2023-12-11 LAB
CTP QC/QA: YES
SARS-COV-2 RDRP RESP QL NAA+PROBE: NEGATIVE

## 2023-12-11 PROCEDURE — 1126F AMNT PAIN NOTED NONE PRSNT: CPT | Mod: CPTII,S$GLB,, | Performed by: FAMILY MEDICINE

## 2023-12-11 PROCEDURE — 4010F ACE/ARB THERAPY RXD/TAKEN: CPT | Mod: CPTII,S$GLB,, | Performed by: FAMILY MEDICINE

## 2023-12-11 PROCEDURE — 1159F MED LIST DOCD IN RCRD: CPT | Mod: CPTII,S$GLB,, | Performed by: FAMILY MEDICINE

## 2023-12-11 PROCEDURE — 99999 PR PBB SHADOW E&M-EST. PATIENT-LVL IV: CPT | Mod: PBBFAC,,, | Performed by: FAMILY MEDICINE

## 2023-12-11 PROCEDURE — 3008F PR BODY MASS INDEX (BMI) DOCUMENTED: ICD-10-PCS | Mod: CPTII,S$GLB,, | Performed by: FAMILY MEDICINE

## 2023-12-11 PROCEDURE — 3078F DIAST BP <80 MM HG: CPT | Mod: CPTII,S$GLB,, | Performed by: FAMILY MEDICINE

## 2023-12-11 PROCEDURE — 3061F NEG MICROALBUMINURIA REV: CPT | Mod: CPTII,S$GLB,, | Performed by: FAMILY MEDICINE

## 2023-12-11 PROCEDURE — 4010F PR ACE/ARB THEARPY RXD/TAKEN: ICD-10-PCS | Mod: CPTII,S$GLB,, | Performed by: FAMILY MEDICINE

## 2023-12-11 PROCEDURE — 3288F FALL RISK ASSESSMENT DOCD: CPT | Mod: CPTII,S$GLB,, | Performed by: FAMILY MEDICINE

## 2023-12-11 PROCEDURE — 87635 SARS-COV-2 COVID-19 AMP PRB: CPT | Mod: QW,S$GLB,, | Performed by: FAMILY MEDICINE

## 2023-12-11 PROCEDURE — 3074F PR MOST RECENT SYSTOLIC BLOOD PRESSURE < 130 MM HG: ICD-10-PCS | Mod: CPTII,S$GLB,, | Performed by: FAMILY MEDICINE

## 2023-12-11 PROCEDURE — 71046 X-RAY EXAM CHEST 2 VIEWS: CPT | Mod: TC

## 2023-12-11 PROCEDURE — 1101F PR PT FALLS ASSESS DOC 0-1 FALLS W/OUT INJ PAST YR: ICD-10-PCS | Mod: CPTII,S$GLB,, | Performed by: FAMILY MEDICINE

## 2023-12-11 PROCEDURE — 3066F PR DOCUMENTATION OF TREATMENT FOR NEPHROPATHY: ICD-10-PCS | Mod: CPTII,S$GLB,, | Performed by: FAMILY MEDICINE

## 2023-12-11 PROCEDURE — 71046 XR CHEST PA AND LATERAL: ICD-10-PCS | Mod: 26,,, | Performed by: RADIOLOGY

## 2023-12-11 PROCEDURE — 71046 X-RAY EXAM CHEST 2 VIEWS: CPT | Mod: 26,,, | Performed by: RADIOLOGY

## 2023-12-11 PROCEDURE — 3074F SYST BP LT 130 MM HG: CPT | Mod: CPTII,S$GLB,, | Performed by: FAMILY MEDICINE

## 2023-12-11 PROCEDURE — 3288F PR FALLS RISK ASSESSMENT DOCUMENTED: ICD-10-PCS | Mod: CPTII,S$GLB,, | Performed by: FAMILY MEDICINE

## 2023-12-11 PROCEDURE — 99999 PR PBB SHADOW E&M-EST. PATIENT-LVL IV: ICD-10-PCS | Mod: PBBFAC,,, | Performed by: FAMILY MEDICINE

## 2023-12-11 PROCEDURE — 1159F PR MEDICATION LIST DOCUMENTED IN MEDICAL RECORD: ICD-10-PCS | Mod: CPTII,S$GLB,, | Performed by: FAMILY MEDICINE

## 2023-12-11 PROCEDURE — 1160F RVW MEDS BY RX/DR IN RCRD: CPT | Mod: CPTII,S$GLB,, | Performed by: FAMILY MEDICINE

## 2023-12-11 PROCEDURE — 99214 PR OFFICE/OUTPT VISIT, EST, LEVL IV, 30-39 MIN: ICD-10-PCS | Mod: S$GLB,,, | Performed by: FAMILY MEDICINE

## 2023-12-11 PROCEDURE — 99214 OFFICE O/P EST MOD 30 MIN: CPT | Mod: S$GLB,,, | Performed by: FAMILY MEDICINE

## 2023-12-11 PROCEDURE — 1160F PR REVIEW ALL MEDS BY PRESCRIBER/CLIN PHARMACIST DOCUMENTED: ICD-10-PCS | Mod: CPTII,S$GLB,, | Performed by: FAMILY MEDICINE

## 2023-12-11 PROCEDURE — 3044F PR MOST RECENT HEMOGLOBIN A1C LEVEL <7.0%: ICD-10-PCS | Mod: CPTII,S$GLB,, | Performed by: FAMILY MEDICINE

## 2023-12-11 PROCEDURE — 1126F PR PAIN SEVERITY QUANTIFIED, NO PAIN PRESENT: ICD-10-PCS | Mod: CPTII,S$GLB,, | Performed by: FAMILY MEDICINE

## 2023-12-11 PROCEDURE — 3061F PR NEG MICROALBUMINURIA RESULT DOCUMENTED/REVIEW: ICD-10-PCS | Mod: CPTII,S$GLB,, | Performed by: FAMILY MEDICINE

## 2023-12-11 PROCEDURE — 1101F PT FALLS ASSESS-DOCD LE1/YR: CPT | Mod: CPTII,S$GLB,, | Performed by: FAMILY MEDICINE

## 2023-12-11 PROCEDURE — 3044F HG A1C LEVEL LT 7.0%: CPT | Mod: CPTII,S$GLB,, | Performed by: FAMILY MEDICINE

## 2023-12-11 PROCEDURE — 3008F BODY MASS INDEX DOCD: CPT | Mod: CPTII,S$GLB,, | Performed by: FAMILY MEDICINE

## 2023-12-11 PROCEDURE — 3066F NEPHROPATHY DOC TX: CPT | Mod: CPTII,S$GLB,, | Performed by: FAMILY MEDICINE

## 2023-12-11 PROCEDURE — 87635: ICD-10-PCS | Mod: QW,S$GLB,, | Performed by: FAMILY MEDICINE

## 2023-12-11 PROCEDURE — 3078F PR MOST RECENT DIASTOLIC BLOOD PRESSURE < 80 MM HG: ICD-10-PCS | Mod: CPTII,S$GLB,, | Performed by: FAMILY MEDICINE

## 2023-12-11 RX ORDER — PREDNISONE 5 MG/1
TABLET ORAL
Qty: 30 TABLET | Refills: 0 | Status: SHIPPED | OUTPATIENT
Start: 2023-12-11 | End: 2023-12-22

## 2023-12-11 RX ORDER — FLUTICASONE PROPIONATE 50 MCG
2 SPRAY, SUSPENSION (ML) NASAL DAILY
Qty: 16 G | Refills: 5 | Status: SHIPPED | OUTPATIENT
Start: 2023-12-11 | End: 2024-03-19

## 2023-12-11 NOTE — ASSESSMENT & PLAN NOTE
BP Readings from Last 6 Encounters:   12/11/23 120/72   10/02/23 120/78   07/25/23 122/80   07/10/23 122/70   05/09/23 120/70   04/25/23 122/66     Lab Results   Component Value Date    EGFRNORACEVR >60.0 07/03/2023    CREATININE 0.6 07/03/2023    BUN 13 07/03/2023    K 4.3 07/03/2023     07/03/2023     07/03/2023     Results for orders placed or performed in visit on 12/24/19   SCHEDULED EKG 12-LEAD (to Muse)    Collection Time: 12/24/19 10:23 AM    Narrative    Test Reason : R42,    Vent. Rate : 073 BPM     Atrial Rate : 073 BPM     P-R Int : 176 ms          QRS Dur : 100 ms      QT Int : 398 ms       P-R-T Axes : 046 000 028 degrees     QTc Int : 438 ms    Normal sinus rhythm  Anterolateral infarct ,age undetermined  Abnormal ECG  No previous ECGs available  Confirmed by KARL ELIZABETH MD ([id number]) on 12/24/2019 11:48:30 AM    Referred By: ROBBIE BRENNER           Confirmed By:KARL ELIZABETH MD

## 2023-12-11 NOTE — ASSESSMENT & PLAN NOTE
Lab Results   Component Value Date    CHOL 147 07/03/2023    CHOL 150 09/14/2022    TRIG 71 07/03/2023    TRIG 94 09/14/2022    HDL 50 07/03/2023    HDL 46 09/14/2022    LDLCALC 82.8 07/03/2023    LDLCALC 85.2 09/14/2022    NONHDLCHOL 97 07/03/2023    NONHDLCHOL 104 09/14/2022    AST 16 07/03/2023    ALT 16 07/03/2023     The 10-year ASCVD risk score (Gilma NEIL, et al., 2019) is: 19.1%    Values used to calculate the score:      Age: 68 years      Sex: Female      Is Non- : No      Diabetic: Yes      Tobacco smoker: No      Systolic Blood Pressure: 120 mmHg      Is BP treated: Yes      HDL Cholesterol: 50 mg/dL      Total Cholesterol: 147 mg/dL

## 2023-12-11 NOTE — PROGRESS NOTES
OFFICE VISIT 12/11/23  8:40 AM CST    CHIEF COMPLAINT: Follow-up    She reports four day history of cough, nasal congestion, postnasal drip, and raspy voice. She reports longstanding seasonal environmental allergies says that she typically gets this type of illness once a year around this time of year. She says that she has not had Covid in over a year. She has not tested for Covid since this present illness started. She says she has previously done well with oral steroids for treatment.    She has been participating with physical therapy for her gait and balance and reports significant improvement.    Chronic conditions of hypertension, hyperlipidemia, and diabetes, are all compensated, well controlled, and stable. She appears to be doing well on current medications.      1. Suspected COVID-19 virus infection  -     POCT COVID-19 Rapid Screening    2. Hypertension associated with diabetes  Assessment & Plan:  BP Readings from Last 6 Encounters:   12/11/23 120/72   10/02/23 120/78   07/25/23 122/80   07/10/23 122/70   05/09/23 120/70   04/25/23 122/66     Lab Results   Component Value Date    EGFRNORACEVR >60.0 07/03/2023    CREATININE 0.6 07/03/2023    BUN 13 07/03/2023    K 4.3 07/03/2023     07/03/2023     07/03/2023     Results for orders placed or performed in visit on 12/24/19   SCHEDULED EKG 12-LEAD (to Muse)    Collection Time: 12/24/19 10:23 AM    Narrative    Test Reason : R42,    Vent. Rate : 073 BPM     Atrial Rate : 073 BPM     P-R Int : 176 ms          QRS Dur : 100 ms      QT Int : 398 ms       P-R-T Axes : 046 000 028 degrees     QTc Int : 438 ms    Normal sinus rhythm  Anterolateral infarct ,age undetermined  Abnormal ECG  No previous ECGs available  Confirmed by KARL ELIZABETH MD ([id number]) on 12/24/2019 11:48:30 AM    Referred By: ROBBIE BRENNER           Confirmed By:KARL ELIZABETH MD          3. Hyperlipidemia associated with type 2 diabetes mellitus  Assessment & Plan:  Lab Results   Component  "Value Date    CHOL 147 07/03/2023    CHOL 150 09/14/2022    TRIG 71 07/03/2023    TRIG 94 09/14/2022    HDL 50 07/03/2023    HDL 46 09/14/2022    LDLCALC 82.8 07/03/2023    LDLCALC 85.2 09/14/2022    NONHDLCHOL 97 07/03/2023    NONHDLCHOL 104 09/14/2022    AST 16 07/03/2023    ALT 16 07/03/2023     The 10-year ASCVD risk score (Gilma NEIL, et al., 2019) is: 19.1%    Values used to calculate the score:      Age: 68 years      Sex: Female      Is Non- : No      Diabetic: Yes      Tobacco smoker: No      Systolic Blood Pressure: 120 mmHg      Is BP treated: Yes      HDL Cholesterol: 50 mg/dL      Total Cholesterol: 147 mg/dL       4. Type 2 diabetes mellitus with diabetic polyneuropathy, without long-term current use of insulin  Assessment & Plan:  Diabetes Management Status    Statin: Taking  ACE/ARB: Taking    Screening or Prevention Patient's value Goal Complete/Controlled?   HgA1C Testing and Control   Lab Results   Component Value Date    HGBA1C 5.4 12/04/2023      Annually/Less than 8% Yes   Lipid profile : 07/03/2023 Annually Yes   LDL control Lab Results   Component Value Date    LDLCALC 82.8 07/03/2023    Annually/Less than 100 mg/dl  Yes   Nephropathy screening Lab Results   Component Value Date    LABMICR 13.0 12/04/2023     Lab Results   Component Value Date    PROTEINUA Negative 05/26/2020    Annually Yes   Blood pressure BP Readings from Last 1 Encounters:   12/11/23 120/72    Less than 140/90 Yes   Dilated retinal exam : 08/22/2023 Annually Yes   Foot exam   : 10/30/2023 Annually Yes     Lab Results   Component Value Date    HGBA1C 5.4 12/04/2023    HGBA1C 5.3 07/03/2023    HGBA1C 5.5 01/03/2023    EGFRNORACEVR >60.0 07/03/2023    MICALBCREAT 13.3 12/04/2023    LDLCALC 82.8 07/03/2023     No results found for: "GLUTAMICACID", "CPEPTIDE"   Last 5 Patient Entered Readings                                          Most Recent A1c:            No data to display               HEALTH " MAINTENANCE: Diabetic health maintenance interventions reviewed and are up to date except for:  There are no preventive care reminders to display for this patient.       5. Seasonal allergies  -     predniSONE (DELTASONE) 5 MG tablet; Take 4 tablets (20 mg total) by mouth once daily for 3 days, THEN 3 tablets (15 mg total) once daily for 3 days, THEN 2 tablets (10 mg total) once daily for 3 days, THEN 1 tablet (5 mg total) once daily for 3 days.  Dispense: 30 tablet; Refill: 0  -     fluticasone propionate (FLONASE) 50 mcg/actuation nasal spray; 2 sprays (100 mcg total) by Each Nostril route once daily.  Dispense: 16 g; Refill: 5    6. Nasopharyngitis  -     predniSONE (DELTASONE) 5 MG tablet; Take 4 tablets (20 mg total) by mouth once daily for 3 days, THEN 3 tablets (15 mg total) once daily for 3 days, THEN 2 tablets (10 mg total) once daily for 3 days, THEN 1 tablet (5 mg total) once daily for 3 days.  Dispense: 30 tablet; Refill: 0    7. Acute cough  -     X-Ray Chest PA And Lateral; Future; Expected date: 12/11/2023    Unless noted herein, any chronic conditions are represented as and appear stable, and no other significant complaints or concerns were reported.    Follow up in about 5 months (around 5/11/2024), or if symptoms worsen or fail to improve, for review test results after completion, discuss plan, re-evaluation.   X-ray today.  Schedule A1c, CMP, Lipid Panel (standing orders) in early May.  Schedule VV or OV with me a few days after May labs.  Future Appointments   Date Time Provider Department Center   8/6/2024 12:50 PM [facility]  MAMMO1 SCREEN [facility] [place] [facility]   8/6/2024  2:00 PM Gwen Krishnan MD [facility] [place] [facility]       Review of Systems   Constitutional:  Negative for fever.   HENT:  Positive for nasal congestion, postnasal drip and voice change. Negative for sinus pressure/congestion, sore throat and trouble swallowing.    Respiratory:  Positive for cough. Negative for chest tightness, shortness of  "breath and wheezing.    Cardiovascular:  Negative for chest pain.   Gastrointestinal:  Negative for abdominal pain.   Endocrine: Negative for polydipsia and polyuria.       Vitals:    12/11/23 0848   BP: 120/72   BP Location: Left arm   Patient Position: Sitting   BP Method: Large (Manual)   Pulse: 107   Resp: 16   Temp: 96.9 °F (36.1 °C)   TempSrc: Tympanic   SpO2: 98%   Weight: 76.2 kg (167 lb 15.9 oz)   Height: 5' 3" (1.6 m)   Physical Exam  Vitals reviewed.   Constitutional:       General: She is not in acute distress.     Appearance: Normal appearance. She is not ill-appearing, toxic-appearing or diaphoretic.   HENT:      Right Ear: Tympanic membrane, ear canal and external ear normal. There is no impacted cerumen.      Left Ear: Tympanic membrane, ear canal and external ear normal. There is no impacted cerumen.      Nose: Rhinorrhea present. No congestion.      Mouth/Throat:      Mouth: Mucous membranes are moist.      Pharynx: Oropharynx is clear. No oropharyngeal exudate or posterior oropharyngeal erythema.   Eyes:      General:         Right eye: No discharge.         Left eye: No discharge.   Cardiovascular:      Rate and Rhythm: Normal rate and regular rhythm.      Heart sounds: Normal heart sounds.   Pulmonary:      Effort: Pulmonary effort is normal.      Breath sounds: Normal breath sounds.   Lymphadenopathy:      Cervical: No cervical adenopathy.   Skin:     General: Skin is warm and dry.   Neurological:      Mental Status: She is alert and oriented to person, place, and time. Mental status is at baseline.   Psychiatric:         Behavior: Behavior normal.       Documentation entered by me for this encounter may have been done in part using speech-recognition technology. Although I have made an effort to ensure accuracy, "sound like" errors may exist and should be interpreted in context.  "

## 2023-12-11 NOTE — ASSESSMENT & PLAN NOTE
"Diabetes Management Status    Statin: Taking  ACE/ARB: Taking    Screening or Prevention Patient's value Goal Complete/Controlled?   HgA1C Testing and Control   Lab Results   Component Value Date    HGBA1C 5.4 12/04/2023      Annually/Less than 8% Yes   Lipid profile : 07/03/2023 Annually Yes   LDL control Lab Results   Component Value Date    LDLCALC 82.8 07/03/2023    Annually/Less than 100 mg/dl  Yes   Nephropathy screening Lab Results   Component Value Date    LABMICR 13.0 12/04/2023     Lab Results   Component Value Date    PROTEINUA Negative 05/26/2020    Annually Yes   Blood pressure BP Readings from Last 1 Encounters:   12/11/23 120/72    Less than 140/90 Yes   Dilated retinal exam : 08/22/2023 Annually Yes   Foot exam   : 10/30/2023 Annually Yes     Lab Results   Component Value Date    HGBA1C 5.4 12/04/2023    HGBA1C 5.3 07/03/2023    HGBA1C 5.5 01/03/2023    EGFRNORACEVR >60.0 07/03/2023    MICALBCREAT 13.3 12/04/2023    LDLCALC 82.8 07/03/2023     No results found for: "GLUTAMICACID", "CPEPTIDE"   Last 5 Patient Entered Readings                                          Most Recent A1c:            No data to display               HEALTH MAINTENANCE: Diabetic health maintenance interventions reviewed and are up to date except for:  There are no preventive care reminders to display for this patient.   "

## 2024-01-09 DIAGNOSIS — M06.00 SERONEGATIVE RHEUMATOID ARTHRITIS: ICD-10-CM

## 2024-01-09 RX ORDER — FOLIC ACID 1 MG/1
1 TABLET ORAL DAILY
Qty: 90 TABLET | Refills: 2 | Status: SHIPPED | OUTPATIENT
Start: 2024-01-09

## 2024-02-17 DIAGNOSIS — E11.69 TYPE 2 DIABETES MELLITUS WITH OTHER SPECIFIED COMPLICATION, WITHOUT LONG-TERM CURRENT USE OF INSULIN: ICD-10-CM

## 2024-02-17 DIAGNOSIS — E11.42 TYPE 2 DIABETES MELLITUS WITH DIABETIC POLYNEUROPATHY, WITHOUT LONG-TERM CURRENT USE OF INSULIN: Chronic | ICD-10-CM

## 2024-02-17 RX ORDER — METFORMIN HYDROCHLORIDE 500 MG/1
1000 TABLET ORAL 2 TIMES DAILY WITH MEALS
Qty: 360 TABLET | Refills: 1 | Status: SHIPPED | OUTPATIENT
Start: 2024-02-17 | End: 2024-05-13 | Stop reason: SDUPTHER

## 2024-02-17 NOTE — TELEPHONE ENCOUNTER
No care due was identified.  Health [facility] Embedded Care Due Messages. Reference number: [id number].   2/17/2024 9:07:43 AM CST

## 2024-02-17 NOTE — TELEPHONE ENCOUNTER
Refill Decision Note   May Jan  is requesting a refill authorization.  Brief Assessment and Rationale for Refill:  Approve     Medication Therapy Plan:        Comments:     Note composed:2:49 PM 02/17/2024

## 2024-03-14 ENCOUNTER — TELEPHONE (OUTPATIENT)
Dept: INTERNAL MEDICINE | Facility: CLINIC | Age: 69
End: 2024-03-14
Payer: MEDICARE

## 2024-03-14 NOTE — TELEPHONE ENCOUNTER
"Asa Lo is a 39 y.o. female.     Anxiety   Presents for follow-up visit. Symptoms include irritability and nervous/anxious behavior. Patient reports no dizziness, palpitations or shortness of breath. The severity of symptoms is moderate. The quality of sleep is fair. Nighttime awakenings: one to two.       Back Pain   This is a chronic (Has been unable to get into a pain clinic recently turned away by ) problem. The current episode started more than 1 year ago. The problem occurs constantly. The problem is unchanged. The pain is present in the lumbar spine. The pain radiates to the left thigh and right thigh. The pain is moderate. The symptoms are aggravated by position. Associated symptoms include paresthesias. Pertinent negatives include no bladder incontinence, bowel incontinence, dysuria, fever, numbness or weakness.        The following portions of the patient's history were reviewed and updated as appropriate: allergies, current medications, past social history and problem list.    Review of Systems   Constitutional: Positive for irritability. Negative for chills and fever.   HENT: Negative for congestion and sinus pressure.    Respiratory: Negative.  Negative for shortness of breath.    Cardiovascular: Negative for palpitations.   Gastrointestinal: Negative.  Negative for bowel incontinence.   Genitourinary: Negative for bladder incontinence, dysuria and hematuria.   Musculoskeletal: Positive for back pain. Negative for arthralgias, gait problem and myalgias.   Neurological: Positive for paresthesias. Negative for dizziness, tremors, weakness and numbness.   Psychiatric/Behavioral: Negative for behavioral problems and dysphoric mood. The patient is nervous/anxious.        Objective   /80   Pulse 118   Resp 16   Ht 167.6 cm (66\")   Wt 92.6 kg (204 lb 3.2 oz)   SpO2 98%   BMI 32.96 kg/m²   Physical Exam   Constitutional: She is oriented to person, place, and time. She appears " ----- Message from Germania Arana sent at 3/14/2024  8:23 AM CDT -----  Contact: [phone]  Patient needs first available appointment due to pre op clearance. Please call patient at [phone]. Thanks KB      well-developed and well-nourished. She is cooperative.   HENT:   Head: Normocephalic and atraumatic.   Eyes: Conjunctivae are normal. Pupils are equal, round, and reactive to light. No scleral icterus.   Neck: Neck supple. Carotid bruit is not present. No thyromegaly present.   Cardiovascular: Normal rate, regular rhythm and normal heart sounds.   Pulmonary/Chest: Effort normal and breath sounds normal.   Abdominal: There is no hepatosplenomegaly.   Musculoskeletal: Normal range of motion.   Lymphadenopathy:     She has no cervical adenopathy.   Neurological: She is alert and oriented to person, place, and time.   Localizes pain lower lumbar region with radiation to both hips and thighs   Skin: Skin is warm and dry. No rash noted.   Psychiatric: She has a normal mood and affect. Her behavior is normal. Judgment and thought content normal. Cognition and memory are normal.   Nursing note and vitals reviewed.      Assessment/Plan   Problem List Items Addressed This Visit        Active Problems    Chronic bilateral low back pain with bilateral sciatica    Anxiety, generalized - Primary    Relevant Medications    DULoxetine (CYMBALTA) 60 MG capsule          New Medications Ordered This Visit   Medications   • cyclobenzaprine (FLEXERIL) 10 MG tablet     Sig: Take 1 tablet by mouth 3 (Three) Times a Day As Needed for Muscle Spasms. for muscle spams     Dispense:  90 tablet     Refill:  2   • DULoxetine (CYMBALTA) 60 MG capsule     Sig: Take 1 capsule by mouth Daily.     Dispense:  30 capsule     Refill:  5   • gabapentin (NEURONTIN) 100 MG capsule     Sig: Take 1 hs for 1 week then 2 qhs     Dispense:  60 capsule     Refill:  2     Add gabapentin and will slowly titrate the dose, he will check with her insurance company and see if there are other pain medicine providers.

## 2024-03-19 ENCOUNTER — OFFICE VISIT (OUTPATIENT)
Dept: INTERNAL MEDICINE | Facility: CLINIC | Age: 69
End: 2024-03-19
Payer: MEDICARE

## 2024-03-19 VITALS
TEMPERATURE: 98 F | OXYGEN SATURATION: 98 % | HEIGHT: 63 IN | DIASTOLIC BLOOD PRESSURE: 62 MMHG | HEART RATE: 101 BPM | WEIGHT: 169.75 LBS | SYSTOLIC BLOOD PRESSURE: 110 MMHG | BODY MASS INDEX: 30.08 KG/M2

## 2024-03-19 DIAGNOSIS — Z79.899 IMMUNOCOMPROMISED STATE DUE TO DRUG THERAPY: Chronic | ICD-10-CM

## 2024-03-19 DIAGNOSIS — M06.00 SERONEGATIVE RHEUMATOID ARTHRITIS: Chronic | ICD-10-CM

## 2024-03-19 DIAGNOSIS — Z79.899 LONG TERM CURRENT USE OF IMMUNOSUPPRESSIVE DRUG: ICD-10-CM

## 2024-03-19 DIAGNOSIS — D84.821 IMMUNOCOMPROMISED STATE DUE TO DRUG THERAPY: Chronic | ICD-10-CM

## 2024-03-19 DIAGNOSIS — E11.69 HYPERLIPIDEMIA ASSOCIATED WITH TYPE 2 DIABETES MELLITUS: Chronic | ICD-10-CM

## 2024-03-19 DIAGNOSIS — Z01.818 PREOP EXAM FOR INTERNAL MEDICINE: ICD-10-CM

## 2024-03-19 DIAGNOSIS — M15.9 GENERALIZED OSTEOARTHRITIS OF MULTIPLE SITES: Chronic | ICD-10-CM

## 2024-03-19 DIAGNOSIS — E78.5 HYPERLIPIDEMIA ASSOCIATED WITH TYPE 2 DIABETES MELLITUS: Chronic | ICD-10-CM

## 2024-03-19 DIAGNOSIS — E11.42 TYPE 2 DIABETES MELLITUS WITH DIABETIC POLYNEUROPATHY, WITHOUT LONG-TERM CURRENT USE OF INSULIN: ICD-10-CM

## 2024-03-19 DIAGNOSIS — E11.59 HYPERTENSION ASSOCIATED WITH DIABETES: Chronic | ICD-10-CM

## 2024-03-19 DIAGNOSIS — I15.2 HYPERTENSION ASSOCIATED WITH DIABETES: Chronic | ICD-10-CM

## 2024-03-19 DIAGNOSIS — H25.9 AGE-RELATED CATARACT OF BOTH EYES, UNSPECIFIED AGE-RELATED CATARACT TYPE: Primary | ICD-10-CM

## 2024-03-19 DIAGNOSIS — I87.303 CHRONIC VENOUS HYPERTENSION INVOLVING BOTH SIDES: Chronic | ICD-10-CM

## 2024-03-19 PROCEDURE — 99214 OFFICE O/P EST MOD 30 MIN: CPT | Mod: S$GLB,,, | Performed by: PHYSICIAN ASSISTANT

## 2024-03-19 PROCEDURE — 1160F RVW MEDS BY RX/DR IN RCRD: CPT | Mod: CPTII,S$GLB,, | Performed by: PHYSICIAN ASSISTANT

## 2024-03-19 PROCEDURE — 3072F LOW RISK FOR RETINOPATHY: CPT | Mod: CPTII,S$GLB,, | Performed by: PHYSICIAN ASSISTANT

## 2024-03-19 PROCEDURE — 3008F BODY MASS INDEX DOCD: CPT | Mod: CPTII,S$GLB,, | Performed by: PHYSICIAN ASSISTANT

## 2024-03-19 PROCEDURE — 3074F SYST BP LT 130 MM HG: CPT | Mod: CPTII,S$GLB,, | Performed by: PHYSICIAN ASSISTANT

## 2024-03-19 PROCEDURE — 99999 PR PBB SHADOW E&M-EST. PATIENT-LVL IV: CPT | Mod: PBBFAC,,, | Performed by: PHYSICIAN ASSISTANT

## 2024-03-19 PROCEDURE — 3288F FALL RISK ASSESSMENT DOCD: CPT | Mod: CPTII,S$GLB,, | Performed by: PHYSICIAN ASSISTANT

## 2024-03-19 PROCEDURE — 4010F ACE/ARB THERAPY RXD/TAKEN: CPT | Mod: CPTII,S$GLB,, | Performed by: PHYSICIAN ASSISTANT

## 2024-03-19 PROCEDURE — 1101F PT FALLS ASSESS-DOCD LE1/YR: CPT | Mod: CPTII,S$GLB,, | Performed by: PHYSICIAN ASSISTANT

## 2024-03-19 PROCEDURE — 3078F DIAST BP <80 MM HG: CPT | Mod: CPTII,S$GLB,, | Performed by: PHYSICIAN ASSISTANT

## 2024-03-19 PROCEDURE — 1159F MED LIST DOCD IN RCRD: CPT | Mod: CPTII,S$GLB,, | Performed by: PHYSICIAN ASSISTANT

## 2024-03-19 PROCEDURE — 1126F AMNT PAIN NOTED NONE PRSNT: CPT | Mod: CPTII,S$GLB,, | Performed by: PHYSICIAN ASSISTANT

## 2024-03-19 NOTE — PROGRESS NOTES
Subjective:      Patient ID: May Montoya is a 68 y.o. female.    Chief Complaint: Pre-op Exam    HPI  The patient is here for a preop clearance to have cataract surgery.  The physician that is performing the surgery is Dr. Vera.     The surgery is being planned for 3/26/2024.    I will send a copy of the referral to the surgeon at fax # [phone].    The patient states that there has not been previous problems with sedation.    Kidney disease, stroke, MI, arrhythmia, seizures, angina, asthma, diabetes, chest pain, thyroid disease, blood/bleeding disorders?      Patient Active Problem List   Diagnosis    Hypertension associated with diabetes    Hyperlipidemia associated with type 2 diabetes mellitus    Cervical stenosis of spine    Type 2 diabetes mellitus with diabetic polyneuropathy, without long-term current use of insulin    Chronic venous hypertension involving legs, L > R    Generalized osteoarthritis of multiple sites    Primary osteoarthritis of both hands    Weight loss    Seronegative rheumatoid arthritis    Long term current use of immunosuppressive drug    Immunocompromised state due to drug therapy    History of COVID-19 ()    Post-concussion syndrome    Paronychia of right great toe due to ingrown toenail    Abnormality of gait due to impairment of balance    Impaired functional mobility, balance, gait, and endurance    Seasonal allergies     Past Surgical History:   Procedure Laterality Date    BREAST BIOPSY      BREAST SURGERY       SECTION      CHOLECYSTECTOMY      DILATION AND CURETTAGE OF UTERUS      GALLBLADDER SURGERY      HYSTEROSCOPY      JOINT REPLACEMENT      NECK SURGERY  2020    peripheral artery angioplasty      TOTAL KNEE ARTHROPLASTY      L    TUBAL LIGATION         Patient Active Problem List   Diagnosis    Hypertension associated with diabetes    Hyperlipidemia associated with type 2 diabetes mellitus    Cervical stenosis of spine    Type 2  diabetes mellitus with diabetic polyneuropathy, without long-term current use of insulin    Chronic venous hypertension involving legs, L > R    Generalized osteoarthritis of multiple sites    Primary osteoarthritis of both hands    Weight loss    Seronegative rheumatoid arthritis    Long term current use of immunosuppressive drug    Immunocompromised state due to drug therapy    History of COVID-19 (December, 2021)    Post-concussion syndrome    Paronychia of right great toe due to ingrown toenail    Abnormality of gait due to impairment of balance    Impaired functional mobility, balance, gait, and endurance    Seasonal allergies         Current Outpatient Medications:     amlodipine-benazepril 2.5-10 mg (LOTREL) 2.5-10 mg per capsule, Take 1 capsule by mouth once daily., Disp: 90 capsule, Rfl: 3    aspirin (ECOTRIN) 81 MG EC tablet, Take 81 mg by mouth once daily., Disp: , Rfl:     celecoxib (CELEBREX) 200 MG capsule, Take 1 capsule (200 mg total) by mouth 2 (two) times daily., Disp: 180 capsule, Rfl: 1    cetirizine (ZYRTEC) 5 MG tablet, Take 5 mg by mouth once daily., Disp: , Rfl:     cholecalciferol, vitamin D3, (VITAMIN D3) 50 mcg (2,000 unit) Cap, Take 1 capsule by mouth once daily., Disp: , Rfl:     folic acid (FOLVITE) 1 MG tablet, Take 1 tablet (1 mg total) by mouth once daily., Disp: 90 tablet, Rfl: 2    GLUCOSAMINE/D3/BOSWELLIA MAYELIN (OSTEO BI-FLEX, 5-LOXIN, ORAL), Take 2 tablets by mouth once daily., Disp: , Rfl:     metFORMIN (GLUCOPHAGE) 500 MG tablet, Take 2 tablets (1,000 mg total) by mouth 2 (two) times daily with meals., Disp: 360 tablet, Rfl: 1    semaglutide (OZEMPIC) 0.25 mg or 0.5 mg (2 mg/3 mL) pen injector, Inject 0.5 mg into the skin every 7 days., Disp: 9 mL, Rfl: 1    fluticasone propionate (FLONASE) 50 mcg/actuation nasal spray, 2 sprays (100 mcg total) by Each Nostril route once daily., Disp: 16 g, Rfl: 5    methotrexate 2.5 MG Tab, Take 6 tablets (15 mg total) by mouth every 7 days.,  "Disp: 72 tablet, Rfl: 1    Review of Systems   Constitutional:  Negative for activity change, appetite change, chills, diaphoresis, fatigue, fever and unexpected weight change.   HENT: Negative.  Negative for congestion, hearing loss, postnasal drip, rhinorrhea, sore throat, trouble swallowing and voice change.    Eyes:  Positive for visual disturbance.   Respiratory: Negative.  Negative for cough, choking, chest tightness and shortness of breath.    Cardiovascular:  Negative for chest pain, palpitations and leg swelling.   Gastrointestinal:  Negative for abdominal distention, abdominal pain, blood in stool, constipation, diarrhea, nausea and vomiting.   Endocrine: Negative for cold intolerance, heat intolerance, polydipsia and polyuria.   Genitourinary: Negative.  Negative for difficulty urinating and frequency.   Musculoskeletal:  Negative for arthralgias, back pain, gait problem, joint swelling and myalgias.   Skin:  Negative for color change, pallor, rash and wound.   Neurological:  Negative for dizziness, tremors, weakness, light-headedness, numbness and headaches.   Hematological:  Negative for adenopathy.   Psychiatric/Behavioral:  Negative for behavioral problems, confusion, self-injury, sleep disturbance and suicidal ideas. The patient is not nervous/anxious.      Objective:   /62 (BP Location: Right arm, Patient Position: Sitting, BP Method: Large (Manual))   Pulse 101   Temp 97.7 °F (36.5 °C) (Tympanic)   Ht 5' 3" (1.6 m)   Wt 77 kg (169 lb 12.1 oz)   LMP  (LMP Unknown)   SpO2 98%   BMI 30.07 kg/m²     Physical Exam  Vitals reviewed.   Constitutional:       General: She is not in acute distress.     Appearance: Normal appearance. She is well-developed. She is not ill-appearing, toxic-appearing or diaphoretic.   HENT:      Head: Normocephalic and atraumatic.      Right Ear: Tympanic membrane, ear canal and external ear normal.      Left Ear: Tympanic membrane, ear canal and external ear " normal.      Nose: Nose normal.   Eyes:      Conjunctiva/sclera: Conjunctivae normal.      Pupils: Pupils are equal, round, and reactive to light.   Cardiovascular:      Rate and Rhythm: Normal rate and regular rhythm.      Heart sounds: Normal heart sounds. No murmur heard.     No friction rub. No gallop.   Pulmonary:      Effort: Pulmonary effort is normal. No respiratory distress.      Breath sounds: Normal breath sounds. No wheezing or rales.   Chest:      Chest wall: No tenderness.   Abdominal:      General: There is no distension.      Palpations: Abdomen is soft.      Tenderness: There is no abdominal tenderness.   Musculoskeletal:         General: Normal range of motion.      Cervical back: Normal range of motion and neck supple.   Lymphadenopathy:      Cervical: No cervical adenopathy.   Skin:     General: Skin is warm and dry.      Capillary Refill: Capillary refill takes less than 2 seconds.      Findings: No rash.   Neurological:      Mental Status: She is alert and oriented to person, place, and time.      Motor: No weakness.      Coordination: Coordination normal.      Gait: Gait normal.   Psychiatric:         Mood and Affect: Mood normal.         Behavior: Behavior normal.         Thought Content: Thought content normal.         Judgment: Judgment normal.       Lab Results   Component Value Date    WBC 7.05 03/13/2023    HGB 13.2 03/13/2023    HCT 39.7 03/13/2023    MCV 94 03/13/2023     03/13/2023       CMP  Sodium   Date Value Ref Range Status   07/03/2023 142 136 - 145 mmol/L Final     Potassium   Date Value Ref Range Status   07/03/2023 4.3 3.5 - 5.1 mmol/L Final     Chloride   Date Value Ref Range Status   07/03/2023 106 95 - 110 mmol/L Final     CO2   Date Value Ref Range Status   07/03/2023 28 23 - 29 mmol/L Final     Glucose   Date Value Ref Range Status   07/03/2023 86 70 - 110 mg/dL Final     BUN   Date Value Ref Range Status   07/03/2023 13 8 - 23 mg/dL Final     Creatinine   Date  Value Ref Range Status   07/03/2023 0.6 0.5 - 1.4 mg/dL Final     Calcium   Date Value Ref Range Status   07/03/2023 9.2 8.7 - 10.5 mg/dL Final     Total Protein   Date Value Ref Range Status   07/03/2023 6.3 6.0 - 8.4 g/dL Final     Albumin   Date Value Ref Range Status   07/03/2023 3.7 3.5 - 5.2 g/dL Final     Total Bilirubin   Date Value Ref Range Status   07/03/2023 0.5 0.1 - 1.0 mg/dL Final     Comment:     For infants and newborns, interpretation of results should be based  on gestational age, weight and in agreement with clinical  observations.    Premature Infant recommended reference ranges:  Up to 24 hours.............<8.0 mg/dL  Up to 48 hours............<12.0 mg/dL  3-5 days..................<15.0 mg/dL  6-29 days.................<15.0 mg/dL       Alkaline Phosphatase   Date Value Ref Range Status   07/03/2023 41 (L) 55 - 135 U/L Final     AST   Date Value Ref Range Status   07/03/2023 16 10 - 40 U/L Final     ALT   Date Value Ref Range Status   07/03/2023 16 10 - 44 U/L Final     Anion Gap   Date Value Ref Range Status   07/03/2023 8 8 - 16 mmol/L Final     eGFR   Date Value Ref Range Status   07/03/2023 >60.0 >60 mL/min/1.73 m^2 Final     Lab Results   Component Value Date    HGBA1C 5.4 12/04/2023     30+ minutes of total time spent on the encounter, which includes face to face time and non-face to face time preparing to see the patient (eg, review of tests), Obtaining and/or reviewing separately obtained history, documenting clinical information in the electronic or other health record, independently interpreting results (not separately reported) and communicating results to the patient/family/caregiver, or Care coordination (not separately reported).    Assessment:     1. Age-related cataract of both eyes, unspecified age-related cataract type    2. Preop exam for internal medicine    3. Hypertension associated with diabetes    4. Hyperlipidemia associated with type 2 diabetes mellitus    5.  Seronegative rheumatoid arthritis    6. Chronic venous hypertension involving legs, L > R    7. Immunocompromised state due to drug therapy    8. Type 2 diabetes mellitus with diabetic polyneuropathy, without long-term current use of insulin    9. Long term current use of immunosuppressive drug    10. Generalized osteoarthritis of multiple sites      Plan:   Age-related cataract of both eyes, unspecified age-related cataract type    Preop exam for internal medicine    Hypertension associated with diabetes    Hyperlipidemia associated with type 2 diabetes mellitus    Seronegative rheumatoid arthritis    Chronic venous hypertension involving legs, L > R    Immunocompromised state due to drug therapy    Type 2 diabetes mellitus with diabetic polyneuropathy, without long-term current use of insulin    Long term current use of immunosuppressive drug    Generalized osteoarthritis of multiple sites    -medical problems stable and controlled.   -I find Ms. Montoya LOW RISK for her upcoming cataract surgery.     Follow up if symptoms worsen or fail to improve.

## 2024-04-09 DIAGNOSIS — E78.5 HYPERLIPIDEMIA ASSOCIATED WITH TYPE 2 DIABETES MELLITUS: Chronic | ICD-10-CM

## 2024-04-09 DIAGNOSIS — E11.9 TYPE 2 DIABETES MELLITUS WITHOUT COMPLICATION, WITHOUT LONG-TERM CURRENT USE OF INSULIN: ICD-10-CM

## 2024-04-09 DIAGNOSIS — M06.00 SERONEGATIVE RHEUMATOID ARTHRITIS: ICD-10-CM

## 2024-04-09 DIAGNOSIS — E11.69 HYPERLIPIDEMIA ASSOCIATED WITH TYPE 2 DIABETES MELLITUS: Chronic | ICD-10-CM

## 2024-04-09 NOTE — TELEPHONE ENCOUNTER
Care Due:                  Date            Visit Type   Department     Provider  --------------------------------------------------------------------------------                                EP -                              PRIMARY      [facility] INTERNAL  Last Visit: 12-      CARE ([facility])   HEMANTH Lane                              EP -                              PRIMARY      [facility] INTERNAL  Next Visit: 05-      CARE ([facility])   HEMANTH Lane                                                            Last  Test          Frequency    Reason                     Performed    Due Date  --------------------------------------------------------------------------------    CMP.........  12 months..  amlodipine-benazepril,     07- 06-                             metFORMIN................    [employer] Embedded Care Due Messages. Reference number: [id number].   4/09/2024 3:18:53 PM CDT

## 2024-04-10 RX ORDER — SEMAGLUTIDE 1.34 MG/ML
1 INJECTION, SOLUTION SUBCUTANEOUS
Qty: 9 ML | Refills: 3 | OUTPATIENT
Start: 2024-04-10 | End: 2024-07-09

## 2024-04-10 RX ORDER — METHOTREXATE 2.5 MG/1
15 TABLET ORAL
Qty: 72 TABLET | Refills: 1 | Status: SHIPPED | OUTPATIENT
Start: 2024-04-10 | End: 2024-07-09

## 2024-04-10 NOTE — TELEPHONE ENCOUNTER
Refill Routing Note   Medication(s) are not appropriate for processing by [facility] for the following reason(s):        Clarification of medication (Rx) details    [facility] action(s):  Defer        Medication Therapy Plan: FLOS; Atorvastatin 40 mg discontinued 12/11/23 by JAMES Lane MD;Reason: Patient no longer taking. Per Epic data, last dispensed date of Atorvastatin 40 mg, 90 day supply, 4/9/24. Ozempic 1 mg/dose discontinued 7/10/23 by JAMES Lane MD;reason: Dose adjustment.      Appointments  past 12m or future 3m with PCP    Date Provider   Last Visit   12/11/2023 JAMES Lane MD   Next Visit   5/13/2024 JAMES aLne MD   ED visits in past 90 days: 0        Note composed:12:30 PM 04/10/2024

## 2024-04-11 DIAGNOSIS — M06.00 SERONEGATIVE RHEUMATOID ARTHRITIS: ICD-10-CM

## 2024-04-11 DIAGNOSIS — M19.042 PRIMARY OSTEOARTHRITIS OF BOTH HANDS: ICD-10-CM

## 2024-04-11 DIAGNOSIS — M15.9 GENERALIZED OSTEOARTHRITIS OF MULTIPLE SITES: Chronic | ICD-10-CM

## 2024-04-11 DIAGNOSIS — M19.041 PRIMARY OSTEOARTHRITIS OF BOTH HANDS: ICD-10-CM

## 2024-04-11 RX ORDER — CELECOXIB 200 MG/1
200 CAPSULE ORAL 2 TIMES DAILY
Qty: 180 CAPSULE | Refills: 1 | Status: SHIPPED | OUTPATIENT
Start: 2024-04-11

## 2024-04-19 ENCOUNTER — TELEPHONE (OUTPATIENT)
Dept: NEUROLOGY | Facility: CLINIC | Age: 69
End: 2024-04-19
Payer: MEDICARE

## 2024-04-19 RX ORDER — ATORVASTATIN CALCIUM 40 MG/1
40 TABLET, FILM COATED ORAL NIGHTLY
Qty: 90 TABLET | Refills: 0 | Status: SHIPPED | OUTPATIENT
Start: 2024-04-19 | End: 2024-05-13 | Stop reason: SDUPTHER

## 2024-04-19 NOTE — TELEPHONE ENCOUNTER
REFILL APPROVED. Will address further refills at upcoming appointment with me listed below.  #LMRX   --------------------------------  Future Appointments   Date Time Provider Department [place]   5/6/2024  7:55 AM LABORATORY, [facility] HG LAB [facility]   5/13/2024 11:20 AM JAMES Lane MD [facility]   8/6/2024 12:50 PM [facility]  [id number] SCREEN [facility] MAMMO [facility]   8/6/2024  2:00 PM Gwen Krishnan MD [facility] OBGYN [facility]   10/28/2024  1:45 PM Domingo Gonzalez MD [facility]
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
## 2024-04-19 NOTE — TELEPHONE ENCOUNTER
Spoke with patient and offered her of the soonest appointment, in November, patient stated that will not work and she will figure something out.

## 2024-04-23 DIAGNOSIS — E11.9 TYPE 2 DIABETES MELLITUS WITHOUT COMPLICATION, WITHOUT LONG-TERM CURRENT USE OF INSULIN: ICD-10-CM

## 2024-04-23 NOTE — TELEPHONE ENCOUNTER
Care Due:                  Date            Visit Type   Department     Provider  --------------------------------------------------------------------------------                                EP -                              PRIMARY      [facility] INTER[facility]  Last Visit: 12-      CARE ([facility])   HEMANTH Lane                              EP -                              PRIMARY      [facility] INTER[facility]  Next Visit: 05-      CARE ([facility])   HEMANTH Lane                                                            Last  Test          Frequency    Reason                     Performed    Due Date  --------------------------------------------------------------------------------    Lipid Panel.  12 months..  atorvastatin.............  07- 06-    Health [facility] Embedded Care Due Messages. Reference number: [id number].   4/23/2024 9:18:04 AM CDT

## 2024-04-23 NOTE — TELEPHONE ENCOUNTER
----- Message from Yeny Landaverde sent at 4/23/2024  9:18 AM CDT -----  Contact: May   Requesting an RX refill or new RX.  Is this a refill or new RX: refill   RX name and strength (copy/paste from chart):  Ozempic   Is this a 30 day or 90 day RX:   Pharmacy name and phone # (copy/paste from chart): [facility]     The doctors have asked that we provide their patients with the following 2 reminders -- prescription refills can take up to 72 hours, and a friendly reminder that in the future you can use your [facility] account to request refills:  May is out of the medication

## 2024-04-24 RX ORDER — SEMAGLUTIDE 1.34 MG/ML
1 INJECTION, SOLUTION SUBCUTANEOUS
Qty: 9 ML | Refills: 3 | OUTPATIENT
Start: 2024-04-24 | End: 2024-07-23

## 2024-04-24 NOTE — TELEPHONE ENCOUNTER
Refill Routing Note   Medication(s) are not appropriate for processing by [facility] for the following reason(s):        No active prescription written by provider    [facility] action(s):  Defer     Requires labs : Yes             Appointments  past 12m or future 3m with PCP    Date Provider   Last Visit   12/11/2023 JAMES Lane MD   Next Visit   5/13/2024 JAMES Lane MD   ED visits in past 90 days: 0        Note composed:8:56 PM 04/23/2024

## 2024-04-24 NOTE — TELEPHONE ENCOUNTER
The requested dose (1 mg) is not the current dose of this medicine listed in her chart. If current dose listed in her chart is incorrect, please clarify with additional information.    Current Outpatient Medication   Medication Sig    semaglutide (OZEMPIC) 0.25 mg or 0.5 mg (2 mg/3 mL) pen injector Inject 0.5 mg into the skin every 7 days.

## 2024-04-29 ENCOUNTER — TELEPHONE (OUTPATIENT)
Dept: INTERNAL MEDICINE | Facility: CLINIC | Age: 69
End: 2024-04-29
Payer: MEDICARE

## 2024-04-29 DIAGNOSIS — E11.42 TYPE 2 DIABETES MELLITUS WITH DIABETIC POLYNEUROPATHY, WITHOUT LONG-TERM CURRENT USE OF INSULIN: Primary | ICD-10-CM

## 2024-04-29 NOTE — TELEPHONE ENCOUNTER
On Thursday, April 25th at 12:39 PM Sara Turcios MA wrote: Spoke with Dionne at pts pharmacy ([facility]), Dionne states the pts has been receiving 1 mg since 04/2023, even though the last RX sent to pharmacy for pt in 07/2023 states .5 mg. Dionne states the pharmacies system has been automatically renewing 1 mg rx from 04/2023.    TO MY TEAM:   Please contact May and relay message below.  --------------------------------------------------------------------------------   Response: Will continue that dose since she appears to be doing well on it and address further refills at her next appointment with me on Monday, May 13th at 11:20 AM.    Medications Ordered This Encounter   Medications    semaglutide (OZEMPIC) 1 mg/dose (4 mg/3 mL)     Sig: Inject 1 mg into the skin every 7 days.     Dispense:  3 mL     Refill:  0     Will address further refills at her next appointment with me on 5/13/2024.      Future Appointments   Date Time Provider Department Center   5/6/2024  7:55 AM LABORATORY, [facility] HG LAB [facility]   5/13/2024 11:20 AM [facility]     Lab Results   Component Value Date    HGBA1C 5.4 12/04/2023    HGBA1C 5.3 07/03/2023    HGBA1C 5.5 01/03/2023    EGFRNORACEVR >60.0 07/03/2023    MICALBCREAT 13.3 12/04/2023    LDLCALC 82.8 07/03/2023     Wt Readings from Last 5 Encounters:   03/19/24 77 kg (169 lb 12.1 oz)   12/11/23 76.2 kg (167 lb 15.9 oz)   10/30/23 76.5 kg (168 lb 10.4 oz)   10/02/23 76.5 kg (168 lb 10.4 oz)   07/25/23 77.1 kg (170 lb)

## 2024-04-29 NOTE — ADDENDUM NOTE
Addended by: JAMES BLEDSOE on: 4/29/2024 02:17 PM     Modules accepted: Orders     17-Oct-2018 19:00

## 2024-04-29 NOTE — TELEPHONE ENCOUNTER
----- Message from Ольгаdhavalemily Manzanares sent at 4/29/2024  1:05 PM CDT -----  Contact: May  .Type:  Patient Returning Call    Who Called: May   Who Left Message for Patient: nurse  Does the patient know what this is regarding?:yes   Would the patient rather a call back or a response via [facility]?  Call back   Best Call Back Number: . [phone]  Additional Information:  pt states she had been without the medication OZEMPIC for 2 weeks        Thanks

## 2024-05-06 ENCOUNTER — LAB VISIT (OUTPATIENT)
Dept: LAB | Facility: HOSPITAL | Age: 69
End: 2024-05-06
Attending: FAMILY MEDICINE
Payer: MEDICARE

## 2024-05-06 DIAGNOSIS — E11.69 TYPE 2 DIABETES MELLITUS WITH OTHER SPECIFIED COMPLICATION, WITHOUT LONG-TERM CURRENT USE OF INSULIN: ICD-10-CM

## 2024-05-06 DIAGNOSIS — Z79.899 LONG TERM CURRENT USE OF IMMUNOSUPPRESSIVE DRUG: ICD-10-CM

## 2024-05-06 DIAGNOSIS — E78.5 HYPERLIPIDEMIA ASSOCIATED WITH TYPE 2 DIABETES MELLITUS: Chronic | ICD-10-CM

## 2024-05-06 DIAGNOSIS — E11.69 HYPERLIPIDEMIA ASSOCIATED WITH TYPE 2 DIABETES MELLITUS: Chronic | ICD-10-CM

## 2024-05-06 LAB
ALBUMIN SERPL BCP-MCNC: 3.7 G/DL (ref 3.5–5.2)
ALP SERPL-CCNC: 42 U/L (ref 55–135)
ALT SERPL W/O P-5'-P-CCNC: 18 U/L (ref 10–44)
ANION GAP SERPL CALC-SCNC: 8 MMOL/L (ref 8–16)
AST SERPL-CCNC: 15 U/L (ref 10–40)
BILIRUB SERPL-MCNC: 0.6 MG/DL (ref 0.1–1)
BUN SERPL-MCNC: 14 MG/DL (ref 8–23)
CALCIUM SERPL-MCNC: 9.6 MG/DL (ref 8.7–10.5)
CHLORIDE SERPL-SCNC: 106 MMOL/L (ref 95–110)
CHOLEST SERPL-MCNC: 157 MG/DL (ref 120–199)
CHOLEST/HDLC SERPL: 3 {RATIO} (ref 2–5)
CO2 SERPL-SCNC: 27 MMOL/L (ref 23–29)
CREAT SERPL-MCNC: 0.6 MG/DL (ref 0.5–1.4)
EST. GFR  (NO RACE VARIABLE): >60 ML/MIN/1.73 M^2
ESTIMATED AVG GLUCOSE: 111 MG/DL (ref 68–131)
GLUCOSE SERPL-MCNC: 90 MG/DL (ref 70–110)
HBA1C MFR BLD: 5.5 % (ref 4–5.6)
HDLC SERPL-MCNC: 52 MG/DL (ref 40–75)
HDLC SERPL: 33.1 % (ref 20–50)
LDLC SERPL CALC-MCNC: 93 MG/DL (ref 63–159)
NONHDLC SERPL-MCNC: 105 MG/DL
POTASSIUM SERPL-SCNC: 4.4 MMOL/L (ref 3.5–5.1)
PROT SERPL-MCNC: 6.4 G/DL (ref 6–8.4)
SODIUM SERPL-SCNC: 141 MMOL/L (ref 136–145)
TRIGL SERPL-MCNC: 60 MG/DL (ref 30–150)

## 2024-05-06 PROCEDURE — 36415 COLL VENOUS BLD VENIPUNCTURE: CPT | Performed by: PHYSICIAN ASSISTANT

## 2024-05-06 PROCEDURE — 80061 LIPID PANEL: CPT | Performed by: PHYSICIAN ASSISTANT

## 2024-05-06 PROCEDURE — 83036 HEMOGLOBIN GLYCOSYLATED A1C: CPT | Performed by: FAMILY MEDICINE

## 2024-05-06 PROCEDURE — 80053 COMPREHEN METABOLIC PANEL: CPT | Performed by: PHYSICIAN ASSISTANT

## 2024-05-13 ENCOUNTER — OFFICE VISIT (OUTPATIENT)
Dept: INTERNAL MEDICINE | Facility: CLINIC | Age: 69
End: 2024-05-13
Payer: MEDICARE

## 2024-05-13 VITALS
WEIGHT: 168.63 LBS | TEMPERATURE: 98 F | HEART RATE: 97 BPM | BODY MASS INDEX: 29.88 KG/M2 | SYSTOLIC BLOOD PRESSURE: 112 MMHG | OXYGEN SATURATION: 98 % | DIASTOLIC BLOOD PRESSURE: 62 MMHG | HEIGHT: 63 IN | RESPIRATION RATE: 18 BRPM

## 2024-05-13 DIAGNOSIS — Z12.12 SCREENING FOR COLORECTAL CANCER: ICD-10-CM

## 2024-05-13 DIAGNOSIS — E11.42 TYPE 2 DIABETES MELLITUS WITH DIABETIC POLYNEUROPATHY, WITHOUT LONG-TERM CURRENT USE OF INSULIN: Chronic | ICD-10-CM

## 2024-05-13 DIAGNOSIS — Z12.11 SCREENING FOR COLORECTAL CANCER: ICD-10-CM

## 2024-05-13 DIAGNOSIS — E11.69 HYPERLIPIDEMIA ASSOCIATED WITH TYPE 2 DIABETES MELLITUS: Chronic | ICD-10-CM

## 2024-05-13 DIAGNOSIS — E11.69 TYPE 2 DIABETES MELLITUS WITH OTHER SPECIFIED COMPLICATION, WITHOUT LONG-TERM CURRENT USE OF INSULIN: Chronic | ICD-10-CM

## 2024-05-13 DIAGNOSIS — I15.2 HYPERTENSION ASSOCIATED WITH DIABETES: Chronic | ICD-10-CM

## 2024-05-13 DIAGNOSIS — E78.5 HYPERLIPIDEMIA ASSOCIATED WITH TYPE 2 DIABETES MELLITUS: Chronic | ICD-10-CM

## 2024-05-13 DIAGNOSIS — E66.3 OVERWEIGHT (BMI 25.0-29.9): Primary | Chronic | ICD-10-CM

## 2024-05-13 DIAGNOSIS — E11.59 HYPERTENSION ASSOCIATED WITH DIABETES: Chronic | ICD-10-CM

## 2024-05-13 PROCEDURE — 4010F ACE/ARB THERAPY RXD/TAKEN: CPT | Mod: CPTII,S$GLB,, | Performed by: FAMILY MEDICINE

## 2024-05-13 PROCEDURE — 3078F DIAST BP <80 MM HG: CPT | Mod: CPTII,S$GLB,, | Performed by: FAMILY MEDICINE

## 2024-05-13 PROCEDURE — 99999 PR PBB SHADOW E&M-EST. PATIENT-LVL IV: CPT | Mod: PBBFAC,,, | Performed by: FAMILY MEDICINE

## 2024-05-13 PROCEDURE — 3288F FALL RISK ASSESSMENT DOCD: CPT | Mod: CPTII,S$GLB,, | Performed by: FAMILY MEDICINE

## 2024-05-13 PROCEDURE — 99214 OFFICE O/P EST MOD 30 MIN: CPT | Mod: S$GLB,,, | Performed by: FAMILY MEDICINE

## 2024-05-13 PROCEDURE — 1101F PT FALLS ASSESS-DOCD LE1/YR: CPT | Mod: CPTII,S$GLB,, | Performed by: FAMILY MEDICINE

## 2024-05-13 PROCEDURE — 3074F SYST BP LT 130 MM HG: CPT | Mod: CPTII,S$GLB,, | Performed by: FAMILY MEDICINE

## 2024-05-13 PROCEDURE — G2211 COMPLEX E/M VISIT ADD ON: HCPCS | Mod: S$GLB,,, | Performed by: FAMILY MEDICINE

## 2024-05-13 PROCEDURE — 1126F AMNT PAIN NOTED NONE PRSNT: CPT | Mod: CPTII,S$GLB,, | Performed by: FAMILY MEDICINE

## 2024-05-13 PROCEDURE — 1159F MED LIST DOCD IN RCRD: CPT | Mod: CPTII,S$GLB,, | Performed by: FAMILY MEDICINE

## 2024-05-13 PROCEDURE — 1160F RVW MEDS BY RX/DR IN RCRD: CPT | Mod: CPTII,S$GLB,, | Performed by: FAMILY MEDICINE

## 2024-05-13 PROCEDURE — 3008F BODY MASS INDEX DOCD: CPT | Mod: CPTII,S$GLB,, | Performed by: FAMILY MEDICINE

## 2024-05-13 PROCEDURE — 3044F HG A1C LEVEL LT 7.0%: CPT | Mod: CPTII,S$GLB,, | Performed by: FAMILY MEDICINE

## 2024-05-13 PROCEDURE — 3072F LOW RISK FOR RETINOPATHY: CPT | Mod: CPTII,S$GLB,, | Performed by: FAMILY MEDICINE

## 2024-05-13 RX ORDER — TOBRAMYCIN 3 MG/ML
1 SOLUTION/ DROPS OPHTHALMIC 4 TIMES DAILY
COMMUNITY
Start: 2024-04-08 | End: 2024-05-13

## 2024-05-13 RX ORDER — AMLODIPINE BESYLATE AND BENAZEPRIL HYDROCHLORIDE 2.5; 1 MG/1; MG/1
1 CAPSULE ORAL DAILY
Qty: 90 CAPSULE | Refills: 2 | Status: SHIPPED | OUTPATIENT
Start: 2024-05-13

## 2024-05-13 RX ORDER — METFORMIN HYDROCHLORIDE 500 MG/1
1000 TABLET ORAL 2 TIMES DAILY WITH MEALS
Qty: 360 TABLET | Refills: 2 | Status: SHIPPED | OUTPATIENT
Start: 2024-05-13

## 2024-05-13 RX ORDER — ATORVASTATIN CALCIUM 40 MG/1
40 TABLET, FILM COATED ORAL NIGHTLY
Qty: 90 TABLET | Refills: 2 | Status: SHIPPED | OUTPATIENT
Start: 2024-05-13

## 2024-05-13 NOTE — PROGRESS NOTES
OFFICE VISIT 5/13/24 11:20 AM CDT    History of Present Illness    May presents today for follow-up.    May reports an improvement in her glucose levels from 6.7 to 5.5 over the past two years. She's been managing her glucose with Ozempic for a year and a half. May experiences vomiting if she does not consume food or a protein shake prior to taking her medication. Over the course of her treatment with Ozempic, she has notably reduced her weight, going from 225 lbs down to 168 lbs.    May reports having undergone cataract surgery and having completed a course of tobramycin eye drops treatment.    May is taking Lipitor and metformin, Ozempic, amlodipine, benazepril, and zinc without any side effects. There was a two week pause in zinc medication due to an issue with refills.    Patient reports that her younger sister recently had surgery for ovarian cancer.    May reports having had a concussion from an accident which occurred over a year ago and has since been experiencing balance issues. She has to be very careful while stepping up or down due to the imbalance, which was not a problem prior to the accident.    May continues to experience arthritis in her hands. She reports an upcoming appointment with Dr. WILL in October for evaluation of her arthritis and anticipates that x-rays of both hands will be taken.    A repeat Cologuard screening test has been ordered, as the last screening was three years prior. The patient has a urine microalbumin lab scheduled for early November.    The doctor proposed scheduling the patient's next appointment as a virtual video visit, to be held in early November. The patient has had some previous experience with video visits but had faced slight difficulty. Regardless, she agreed to the suggestion as it would potentially keep her safe during the flu season.       Review of Systems   Respiratory:  Negative for chest tightness and shortness of breath.    Cardiovascular:   "Negative for chest pain.   Endocrine: Negative for polydipsia and polyuria.       Assessment & Plan     Reviewed the patient's current medications and health status, noting significant improvements.   The patient's hemoglobin A1c levels decreased from 6.7 to 5.5 over the past two years, and their weight dropped from 225 to 168.   Confirmed the patient's tolerance of Ozempic, Lipitor, Metformin, and Amlodipine Benazepril, which they have been taking for a year and a half.   Noted the patient's successful completion of a course of tobramycin eye drops following cataract surgery.   Will continue the current treatment plan, including the medication Ozempic.   Prescribed more than six months' worth of refills on all medications.   Ordered a Cologuard screening, as it has been three years since the last one.   Explained the Cologuard screening process and emphasized the importance of regular screenings.   Additionally, ordered labs for A1c, a comprehensive metabolic panel, and a urine microalbumin lab.   Mentioned that the patient's rheumatologist might order additional labs during their October appointment.   Scheduled a follow-up visit in six months to review lab results, suggesting this appointment be a video visit to avoid potential exposure to influenza during flu season.   Noted the patient's upcoming mammogram and urologist appointment for balance issues possibly related to a previous accident.   Also, the patient has an appointment with a rheumatologist in October.   Educated the patient about the importance of controlling glucose levels and maintaining a healthy weight.   Discussed the importance of taking medications with food or a protein shake to avoid nausea and vomiting.       1. Overweight (BMI 25.0-29.9)  Assessment & Plan:  Estimated body mass index is 29.88 kg/m² as calculated from the following:    Height as of this encounter: 5' 3" (1.6 m).    Weight as of this encounter: 76.5 kg (168 lb 10.4 oz). " "      2. Hypertension associated with diabetes  Comments:  Pt BP is well controlled  Orders:  -     amlodipine-benazepril 2.5-10 mg (LOTREL) 2.5-10 mg per capsule; Take 1 capsule by mouth once daily.  Dispense: 90 capsule; Refill: 2    3. Hyperlipidemia associated with type 2 diabetes mellitus  -     atorvastatin (LIPITOR) 40 MG tablet; Take 1 tablet (40 mg total) by mouth every evening.  Dispense: 90 tablet; Refill: 2    4. Type 2 diabetes mellitus with other specified complication, without long-term current use of insulin  -     metFORMIN (GLUCOPHAGE) 500 MG tablet; Take 2 tablets (1,000 mg total) by mouth 2 (two) times daily with meals.  Dispense: 360 tablet; Refill: 2    5. Type 2 diabetes mellitus with diabetic polyneuropathy, without long-term current use of insulin  -     metFORMIN (GLUCOPHAGE) 500 MG tablet; Take 2 tablets (1,000 mg total) by mouth 2 (two) times daily with meals.  Dispense: 360 tablet; Refill: 2  -     semaglutide (OZEMPIC) 1 mg/dose (4 mg/3 mL); Inject 1 mg into the skin every 7 days.  Dispense: 3 mL; Refill: 6    6. Screening for colorectal cancer  -     Cologuard Screening (Multitarget Stool DNA); Future; Expected date: 05/13/2024    No other significant complaints or concerns were reported.    Today's visit involved the intricate management of episodic problem(s) and the ongoing care for the patient's serious or complex condition(s) listed above, reflecting the inherent complexity of providing longitudinal, comprehensive evaluation and management as the central hub for the patient's primary care services.  Vitals:    05/13/24 1124   BP: 112/62   BP Location: Right arm   Patient Position: Sitting   BP Method: Large (Manual)   Pulse: 97   Resp: 18   Temp: 98 °F (36.7 °C)   SpO2: 98%   Weight: 76.5 kg (168 lb 10.4 oz)   Height: 5' 3" (1.6 m)   Physical Exam  Vitals reviewed.   Constitutional:       General: She is not in acute distress.     Appearance: Normal appearance. She is not " "ill-appearing or diaphoretic.   Cardiovascular:      Rate and Rhythm: Normal rate and regular rhythm.   Pulmonary:      Effort: Pulmonary effort is normal.      Breath sounds: Normal breath sounds.   Skin:     General: Skin is warm and dry.   Neurological:      Mental Status: She is alert and oriented to person, place, and time. Mental status is at baseline.   Psychiatric:         Mood and Affect: Mood normal.         Behavior: Behavior normal.         Judgment: Judgment normal.       DIABETIC FOOT EXAM:  Protective Sensation (w/ 10 gram monofilament):  Right: Intact  Left: Intact    Visual Inspection:  Normal -  Bilateral    Pedal Pulses:   Right: Present  Left: Present    Posterior Tibialis Pulses:   Right:Present  Left: Present       This note was generated with the assistance of ambient listening technology. Verbal consent was obtained by the patient and accompanying visitor(s) for the recording of patient appointment to facilitate this note. I attest to having reviewed and edited the generated note for accuracy, though some syntax or spelling errors may persist. Please contact the author of this note for any clarification.    Documentation entered by me for this encounter may have been done in part using speech-recognition technology. Although I have made an effort to ensure accuracy, "sound like" errors may exist and should be interpreted in context.   "

## 2024-05-13 NOTE — ASSESSMENT & PLAN NOTE
"Estimated body mass index is 29.88 kg/m² as calculated from the following:    Height as of this encounter: 5' 3" (1.6 m).    Weight as of this encounter: 76.5 kg (168 lb 10.4 oz).   "

## 2024-06-01 LAB — NONINV COLON CA DNA+OCC BLD SCRN STL QL: NEGATIVE

## 2024-07-17 DIAGNOSIS — E11.42 TYPE 2 DIABETES MELLITUS WITH DIABETIC POLYNEUROPATHY, WITHOUT LONG-TERM CURRENT USE OF INSULIN: Chronic | ICD-10-CM

## 2024-07-17 NOTE — TELEPHONE ENCOUNTER
No care due was identified.  [facility] Embedded Care Due Messages. Reference number: [id number].   7/17/2024 11:54:03 AM CDT

## 2024-07-17 NOTE — TELEPHONE ENCOUNTER
Refill Routing Note   Medication(s) are not appropriate for processing by [facility] for the following reason(s):        New or recently adjusted medication    [facility] action(s):  Defer               Appointments  past 12m or future 3m with PCP    Date Provider   Last Visit   5/13/2024 JAMES Lane MD   Next Visit   11/11/2024 JAMES Lane MD   ED visits in past 90 days: 0        Note composed:1:38 PM 07/17/2024

## 2024-07-19 RX ORDER — SEMAGLUTIDE 1.34 MG/ML
1 INJECTION, SOLUTION SUBCUTANEOUS
Qty: 9 ML | Refills: 0 | Status: SHIPPED | OUTPATIENT
Start: 2024-07-19

## 2024-07-19 NOTE — TELEPHONE ENCOUNTER
REFILL REQUEST APPROVED  Requested Prescriptions   Pending Prescriptions Disp Refills    semaglutide (OZEMPIC) 1 mg/dose (4 mg/3 mL) [Pharmacy Med Name: Ozempic 1 mg/dose (4 mg/3 mL) subcutaneous pen injector] 9 mL 1     Sig: Inject 1 mg into the skin every 7 days.

## 2024-09-08 DIAGNOSIS — M19.042 PRIMARY OSTEOARTHRITIS OF BOTH HANDS: ICD-10-CM

## 2024-09-08 DIAGNOSIS — M15.9 GENERALIZED OSTEOARTHRITIS OF MULTIPLE SITES: Chronic | ICD-10-CM

## 2024-09-08 DIAGNOSIS — M06.00 SERONEGATIVE RHEUMATOID ARTHRITIS: ICD-10-CM

## 2024-09-08 DIAGNOSIS — M19.041 PRIMARY OSTEOARTHRITIS OF BOTH HANDS: ICD-10-CM

## 2024-09-08 RX ORDER — CELECOXIB 200 MG/1
200 CAPSULE ORAL 2 TIMES DAILY
Qty: 180 CAPSULE | Refills: 1 | Status: SHIPPED | OUTPATIENT
Start: 2024-09-08

## 2024-10-21 DIAGNOSIS — M06.00 SERONEGATIVE RHEUMATOID ARTHRITIS: ICD-10-CM

## 2024-10-21 RX ORDER — METHOTREXATE 2.5 MG/1
15 TABLET ORAL
Qty: 72 TABLET | Refills: 1 | OUTPATIENT
Start: 2024-10-21 | End: 2025-01-19

## 2024-10-27 PROBLEM — Z51.81 ENCOUNTER FOR MEDICATION MONITORING: Status: ACTIVE | Noted: 2024-10-27

## 2024-10-28 ENCOUNTER — OFFICE VISIT (OUTPATIENT)
Dept: RHEUMATOLOGY | Facility: CLINIC | Age: 69
End: 2024-10-28
Payer: MEDICARE

## 2024-10-28 VITALS
SYSTOLIC BLOOD PRESSURE: 139 MMHG | WEIGHT: 166 LBS | DIASTOLIC BLOOD PRESSURE: 73 MMHG | HEIGHT: 63 IN | BODY MASS INDEX: 29.41 KG/M2 | HEART RATE: 102 BPM

## 2024-10-28 DIAGNOSIS — Z79.899 DRUG-INDUCED IMMUNODEFICIENCY: ICD-10-CM

## 2024-10-28 DIAGNOSIS — M06.00 SERONEGATIVE RHEUMATOID ARTHRITIS: ICD-10-CM

## 2024-10-28 DIAGNOSIS — D84.821 DRUG-INDUCED IMMUNODEFICIENCY: ICD-10-CM

## 2024-10-28 DIAGNOSIS — M79.641 BILATERAL HAND PAIN: ICD-10-CM

## 2024-10-28 DIAGNOSIS — M06.00 SERONEGATIVE RHEUMATOID ARTHRITIS: Primary | ICD-10-CM

## 2024-10-28 DIAGNOSIS — M79.642 BILATERAL HAND PAIN: ICD-10-CM

## 2024-10-28 DIAGNOSIS — Z51.81 ENCOUNTER FOR MEDICATION MONITORING: ICD-10-CM

## 2024-10-28 DIAGNOSIS — M15.9 GENERALIZED OSTEOARTHRITIS OF MULTIPLE SITES: ICD-10-CM

## 2024-10-28 PROCEDURE — 3044F HG A1C LEVEL LT 7.0%: CPT | Mod: CPTII,S$GLB,, | Performed by: INTERNAL MEDICINE

## 2024-10-28 PROCEDURE — 1159F MED LIST DOCD IN RCRD: CPT | Mod: CPTII,S$GLB,, | Performed by: INTERNAL MEDICINE

## 2024-10-28 PROCEDURE — 1160F RVW MEDS BY RX/DR IN RCRD: CPT | Mod: CPTII,S$GLB,, | Performed by: INTERNAL MEDICINE

## 2024-10-28 PROCEDURE — G2211 COMPLEX E/M VISIT ADD ON: HCPCS | Mod: S$GLB,,, | Performed by: INTERNAL MEDICINE

## 2024-10-28 PROCEDURE — 3288F FALL RISK ASSESSMENT DOCD: CPT | Mod: CPTII,S$GLB,, | Performed by: INTERNAL MEDICINE

## 2024-10-28 PROCEDURE — 3072F LOW RISK FOR RETINOPATHY: CPT | Mod: CPTII,S$GLB,, | Performed by: INTERNAL MEDICINE

## 2024-10-28 PROCEDURE — 1101F PT FALLS ASSESS-DOCD LE1/YR: CPT | Mod: CPTII,S$GLB,, | Performed by: INTERNAL MEDICINE

## 2024-10-28 PROCEDURE — 1125F AMNT PAIN NOTED PAIN PRSNT: CPT | Mod: CPTII,S$GLB,, | Performed by: INTERNAL MEDICINE

## 2024-10-28 PROCEDURE — 4010F ACE/ARB THERAPY RXD/TAKEN: CPT | Mod: CPTII,S$GLB,, | Performed by: INTERNAL MEDICINE

## 2024-10-28 PROCEDURE — 3078F DIAST BP <80 MM HG: CPT | Mod: CPTII,S$GLB,, | Performed by: INTERNAL MEDICINE

## 2024-10-28 PROCEDURE — 3008F BODY MASS INDEX DOCD: CPT | Mod: CPTII,S$GLB,, | Performed by: INTERNAL MEDICINE

## 2024-10-28 PROCEDURE — 99999 PR PBB SHADOW E&M-EST. PATIENT-LVL IV: CPT | Mod: PBBFAC,,, | Performed by: INTERNAL MEDICINE

## 2024-10-28 PROCEDURE — 3075F SYST BP GE 130 - 139MM HG: CPT | Mod: CPTII,S$GLB,, | Performed by: INTERNAL MEDICINE

## 2024-10-28 PROCEDURE — 99215 OFFICE O/P EST HI 40 MIN: CPT | Mod: S$GLB,,, | Performed by: INTERNAL MEDICINE

## 2024-10-28 RX ORDER — METHYLPREDNISOLONE 4 MG/1
TABLET ORAL
COMMUNITY
Start: 2024-07-23

## 2024-10-28 RX ORDER — CYCLOBENZAPRINE HCL 5 MG
1 TABLET ORAL NIGHTLY PRN
COMMUNITY
Start: 2024-07-23

## 2024-10-28 RX ORDER — ZINC SULFATE 50(220)MG
220 CAPSULE ORAL DAILY
COMMUNITY

## 2024-10-28 RX ORDER — FOLIC ACID 1 MG/1
1 TABLET ORAL DAILY
Qty: 90 TABLET | Refills: 2 | Status: SHIPPED | OUTPATIENT
Start: 2024-10-28

## 2024-11-02 DIAGNOSIS — E11.69 HYPERLIPIDEMIA ASSOCIATED WITH TYPE 2 DIABETES MELLITUS: Chronic | ICD-10-CM

## 2024-11-02 DIAGNOSIS — E78.5 HYPERLIPIDEMIA ASSOCIATED WITH TYPE 2 DIABETES MELLITUS: Chronic | ICD-10-CM

## 2024-11-02 NOTE — TELEPHONE ENCOUNTER
Care Due:                  Date            Visit Type   Department     Provider  --------------------------------------------------------------------------------                                EP -                              PRIMARY      [facility]  Last Visit: 05-      CARE ([facility])   MEDICINE       [address]                              ESTABLISHED                              PATIENT -    [facility] INTER[facility]  Next Visit: 11-      VIRTUAL      MEDICINE       [address]                                                            Last  Test          Frequency    Reason                     Performed    Due Date  --------------------------------------------------------------------------------    HBA1C.......  6 months...  OZEMPIC, metFORMIN.......  05- 11-    [facility] Embedded Care Due Messages. Reference number: [id number].   11/02/2024 9:52:43 AM CDT

## 2024-11-03 RX ORDER — ATORVASTATIN CALCIUM 40 MG/1
40 TABLET, FILM COATED ORAL NIGHTLY
Qty: 90 TABLET | Refills: 1 | Status: SHIPPED | OUTPATIENT
Start: 2024-11-03

## 2024-11-03 NOTE — TELEPHONE ENCOUNTER
Refill Decision Note   May Montoya  is requesting a refill authorization.  Brief Assessment and Rationale for Refill:  Approve     Medication Therapy Plan:  FLOS      Comments:     Note composed:3:35 PM 11/03/2024

## 2024-11-04 ENCOUNTER — LAB VISIT (OUTPATIENT)
Dept: LAB | Facility: HOSPITAL | Age: 69
End: 2024-11-04
Attending: PHYSICIAN ASSISTANT
Payer: MEDICARE

## 2024-11-04 DIAGNOSIS — E78.5 HYPERLIPIDEMIA ASSOCIATED WITH TYPE 2 DIABETES MELLITUS: Chronic | ICD-10-CM

## 2024-11-04 DIAGNOSIS — E11.59 HYPERTENSION ASSOCIATED WITH DIABETES: Chronic | ICD-10-CM

## 2024-11-04 DIAGNOSIS — Z79.899 LONG TERM CURRENT USE OF IMMUNOSUPPRESSIVE DRUG: ICD-10-CM

## 2024-11-04 DIAGNOSIS — I15.2 HYPERTENSION ASSOCIATED WITH DIABETES: Chronic | ICD-10-CM

## 2024-11-04 DIAGNOSIS — E11.69 TYPE 2 DIABETES MELLITUS WITH OTHER SPECIFIED COMPLICATION, WITHOUT LONG-TERM CURRENT USE OF INSULIN: ICD-10-CM

## 2024-11-04 DIAGNOSIS — E11.69 HYPERLIPIDEMIA ASSOCIATED WITH TYPE 2 DIABETES MELLITUS: Chronic | ICD-10-CM

## 2024-11-04 LAB
ALBUMIN SERPL BCP-MCNC: 3.8 G/DL (ref 3.5–5.2)
ALBUMIN/CREAT UR: NORMAL UG/MG (ref 0–30)
ALP SERPL-CCNC: 44 U/L (ref 40–150)
ALT SERPL W/O P-5'-P-CCNC: 13 U/L (ref 10–44)
ANION GAP SERPL CALC-SCNC: 11 MMOL/L (ref 8–16)
AST SERPL-CCNC: 17 U/L (ref 10–40)
BILIRUB SERPL-MCNC: 0.3 MG/DL (ref 0.1–1)
BUN SERPL-MCNC: 16 MG/DL (ref 8–23)
CALCIUM SERPL-MCNC: 9.3 MG/DL (ref 8.7–10.5)
CHLORIDE SERPL-SCNC: 105 MMOL/L (ref 95–110)
CO2 SERPL-SCNC: 25 MMOL/L (ref 23–29)
CREAT SERPL-MCNC: 0.7 MG/DL (ref 0.5–1.4)
CREAT UR-MCNC: 40 MG/DL (ref 15–325)
EST. GFR  (NO RACE VARIABLE): >60 ML/MIN/1.73 M^2
ESTIMATED AVG GLUCOSE: 105 MG/DL (ref 68–131)
GLUCOSE SERPL-MCNC: 95 MG/DL (ref 70–110)
HBA1C MFR BLD: 5.3 % (ref 4–5.6)
MICROALBUMIN UR DL<=1MG/L-MCNC: <5 UG/ML
POTASSIUM SERPL-SCNC: 4.5 MMOL/L (ref 3.5–5.1)
PROT SERPL-MCNC: 6.5 G/DL (ref 6–8.4)
SODIUM SERPL-SCNC: 141 MMOL/L (ref 136–145)

## 2024-11-04 PROCEDURE — 80053 COMPREHEN METABOLIC PANEL: CPT | Performed by: PHYSICIAN ASSISTANT

## 2024-11-04 PROCEDURE — 36415 COLL VENOUS BLD VENIPUNCTURE: CPT | Mod: PO | Performed by: FAMILY MEDICINE

## 2024-11-04 PROCEDURE — 82570 ASSAY OF URINE CREATININE: CPT | Performed by: FAMILY MEDICINE

## 2024-11-04 PROCEDURE — 83036 HEMOGLOBIN GLYCOSYLATED A1C: CPT | Performed by: FAMILY MEDICINE

## 2024-11-05 ENCOUNTER — HOSPITAL ENCOUNTER (OUTPATIENT)
Dept: RADIOLOGY | Facility: HOSPITAL | Age: 69
Discharge: HOME | End: 2024-11-05
Attending: INTERNAL MEDICINE
Payer: MEDICARE

## 2024-11-05 DIAGNOSIS — M79.642 BILATERAL HAND PAIN: ICD-10-CM

## 2024-11-05 DIAGNOSIS — M79.641 BILATERAL HAND PAIN: ICD-10-CM

## 2024-11-05 DIAGNOSIS — M18.0 ARTHRITIS OF CARPOMETACARPAL (CMC) JOINT OF BOTH THUMBS: Primary | ICD-10-CM

## 2024-11-05 PROCEDURE — 25500020 PHARM REV CODE 255: Mod: PN | Performed by: INTERNAL MEDICINE

## 2024-11-05 PROCEDURE — 73223 MRI JOINT UPR EXTR W/O&W/DYE: CPT | Mod: TC,50,PN

## 2024-11-05 PROCEDURE — A9585 GADOBUTROL INJECTION: HCPCS | Mod: PN | Performed by: INTERNAL MEDICINE

## 2024-11-05 PROCEDURE — 73223 MRI JOINT UPR EXTR W/O&W/DYE: CPT | Mod: 26,50,, | Performed by: RADIOLOGY

## 2024-11-05 RX ORDER — GADOBUTROL 604.72 MG/ML
7.5 INJECTION INTRAVENOUS
Status: COMPLETED | OUTPATIENT
Start: 2024-11-05 | End: 2024-11-05

## 2024-11-05 RX ADMIN — GADOBUTROL 7.5 ML: 604.72 INJECTION INTRAVENOUS at 04:11

## 2024-11-05 NOTE — PROGRESS NOTES
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  I have sent an urgent referral to see our hand surgeon for further evaluation and treatment.  Dr. CHIRINOS

## 2024-11-08 DIAGNOSIS — M79.641 BILATERAL HAND PAIN: Primary | ICD-10-CM

## 2024-11-08 DIAGNOSIS — M79.642 BILATERAL HAND PAIN: Primary | ICD-10-CM

## 2024-11-11 ENCOUNTER — HOSPITAL ENCOUNTER (OUTPATIENT)
Dept: RADIOLOGY | Facility: HOSPITAL | Age: 69
Discharge: HOME | End: 2024-11-11
Attending: STUDENT IN AN ORGANIZED HEALTH CARE EDUCATION/TRAINING PROGRAM
Payer: MEDICARE

## 2024-11-11 ENCOUNTER — OFFICE VISIT (OUTPATIENT)
Dept: ORTHOPEDICS | Facility: CLINIC | Age: 69
End: 2024-11-11
Payer: MEDICARE

## 2024-11-11 ENCOUNTER — OFFICE VISIT (OUTPATIENT)
Dept: INTERNAL MEDICINE | Facility: CLINIC | Age: 69
End: 2024-11-11
Payer: MEDICARE

## 2024-11-11 ENCOUNTER — PATIENT MESSAGE (OUTPATIENT)
Dept: INTERNAL MEDICINE | Facility: CLINIC | Age: 69
End: 2024-11-11

## 2024-11-11 VITALS — WEIGHT: 166 LBS | BODY MASS INDEX: 29.41 KG/M2 | HEIGHT: 63 IN

## 2024-11-11 DIAGNOSIS — E78.5 HYPERLIPIDEMIA ASSOCIATED WITH TYPE 2 DIABETES MELLITUS: Chronic | ICD-10-CM

## 2024-11-11 DIAGNOSIS — E11.42 TYPE 2 DIABETES MELLITUS WITH DIABETIC POLYNEUROPATHY, WITHOUT LONG-TERM CURRENT USE OF INSULIN: Chronic | ICD-10-CM

## 2024-11-11 DIAGNOSIS — I15.2 HYPERTENSION ASSOCIATED WITH DIABETES: Primary | Chronic | ICD-10-CM

## 2024-11-11 DIAGNOSIS — M18.0 ARTHRITIS OF CARPOMETACARPAL (CMC) JOINT OF BOTH THUMBS: ICD-10-CM

## 2024-11-11 DIAGNOSIS — M79.642 BILATERAL HAND PAIN: ICD-10-CM

## 2024-11-11 DIAGNOSIS — E11.69 TYPE 2 DIABETES MELLITUS WITH OTHER SPECIFIED COMPLICATION, WITHOUT LONG-TERM CURRENT USE OF INSULIN: Chronic | ICD-10-CM

## 2024-11-11 DIAGNOSIS — E11.59 HYPERTENSION ASSOCIATED WITH DIABETES: Primary | Chronic | ICD-10-CM

## 2024-11-11 DIAGNOSIS — E11.69 HYPERLIPIDEMIA ASSOCIATED WITH TYPE 2 DIABETES MELLITUS: Chronic | ICD-10-CM

## 2024-11-11 DIAGNOSIS — E11.59 HYPERTENSION ASSOCIATED WITH DIABETES: Chronic | ICD-10-CM

## 2024-11-11 DIAGNOSIS — I15.2 HYPERTENSION ASSOCIATED WITH DIABETES: Chronic | ICD-10-CM

## 2024-11-11 DIAGNOSIS — M79.641 BILATERAL HAND PAIN: ICD-10-CM

## 2024-11-11 DIAGNOSIS — E66.3 OVERWEIGHT (BMI 25.0-29.9): Chronic | ICD-10-CM

## 2024-11-11 PROCEDURE — 1126F AMNT PAIN NOTED NONE PRSNT: CPT | Mod: CPTII,S$GLB,, | Performed by: STUDENT IN AN ORGANIZED HEALTH CARE EDUCATION/TRAINING PROGRAM

## 2024-11-11 PROCEDURE — 99204 OFFICE O/P NEW MOD 45 MIN: CPT | Mod: 25,S$GLB,, | Performed by: STUDENT IN AN ORGANIZED HEALTH CARE EDUCATION/TRAINING PROGRAM

## 2024-11-11 PROCEDURE — 3066F NEPHROPATHY DOC TX: CPT | Mod: CPTII,95,, | Performed by: FAMILY MEDICINE

## 2024-11-11 PROCEDURE — 20600 DRAIN/INJ JOINT/BURSA W/O US: CPT | Mod: 50,S$GLB,, | Performed by: STUDENT IN AN ORGANIZED HEALTH CARE EDUCATION/TRAINING PROGRAM

## 2024-11-11 PROCEDURE — 4010F ACE/ARB THERAPY RXD/TAKEN: CPT | Mod: CPTII,S$GLB,, | Performed by: STUDENT IN AN ORGANIZED HEALTH CARE EDUCATION/TRAINING PROGRAM

## 2024-11-11 PROCEDURE — 99214 OFFICE O/P EST MOD 30 MIN: CPT | Mod: 95,,, | Performed by: FAMILY MEDICINE

## 2024-11-11 PROCEDURE — 1159F MED LIST DOCD IN RCRD: CPT | Mod: CPTII,S$GLB,, | Performed by: STUDENT IN AN ORGANIZED HEALTH CARE EDUCATION/TRAINING PROGRAM

## 2024-11-11 PROCEDURE — G2211 COMPLEX E/M VISIT ADD ON: HCPCS | Mod: 95,,, | Performed by: FAMILY MEDICINE

## 2024-11-11 PROCEDURE — 3061F NEG MICROALBUMINURIA REV: CPT | Mod: CPTII,S$GLB,, | Performed by: STUDENT IN AN ORGANIZED HEALTH CARE EDUCATION/TRAINING PROGRAM

## 2024-11-11 PROCEDURE — 3044F HG A1C LEVEL LT 7.0%: CPT | Mod: CPTII,95,, | Performed by: FAMILY MEDICINE

## 2024-11-11 PROCEDURE — 3008F BODY MASS INDEX DOCD: CPT | Mod: CPTII,S$GLB,, | Performed by: STUDENT IN AN ORGANIZED HEALTH CARE EDUCATION/TRAINING PROGRAM

## 2024-11-11 PROCEDURE — 3072F LOW RISK FOR RETINOPATHY: CPT | Mod: CPTII,S$GLB,, | Performed by: STUDENT IN AN ORGANIZED HEALTH CARE EDUCATION/TRAINING PROGRAM

## 2024-11-11 PROCEDURE — 3066F NEPHROPATHY DOC TX: CPT | Mod: CPTII,S$GLB,, | Performed by: STUDENT IN AN ORGANIZED HEALTH CARE EDUCATION/TRAINING PROGRAM

## 2024-11-11 PROCEDURE — 3288F FALL RISK ASSESSMENT DOCD: CPT | Mod: CPTII,S$GLB,, | Performed by: STUDENT IN AN ORGANIZED HEALTH CARE EDUCATION/TRAINING PROGRAM

## 2024-11-11 PROCEDURE — 4010F ACE/ARB THERAPY RXD/TAKEN: CPT | Mod: CPTII,95,, | Performed by: FAMILY MEDICINE

## 2024-11-11 PROCEDURE — 99999 PR PBB SHADOW E&M-EST. PATIENT-LVL IV: CPT | Mod: PBBFAC,,, | Performed by: STUDENT IN AN ORGANIZED HEALTH CARE EDUCATION/TRAINING PROGRAM

## 2024-11-11 PROCEDURE — 1160F RVW MEDS BY RX/DR IN RCRD: CPT | Mod: CPTII,S$GLB,, | Performed by: STUDENT IN AN ORGANIZED HEALTH CARE EDUCATION/TRAINING PROGRAM

## 2024-11-11 PROCEDURE — 1101F PT FALLS ASSESS-DOCD LE1/YR: CPT | Mod: CPTII,S$GLB,, | Performed by: STUDENT IN AN ORGANIZED HEALTH CARE EDUCATION/TRAINING PROGRAM

## 2024-11-11 PROCEDURE — 3061F NEG MICROALBUMINURIA REV: CPT | Mod: CPTII,95,, | Performed by: FAMILY MEDICINE

## 2024-11-11 PROCEDURE — 3044F HG A1C LEVEL LT 7.0%: CPT | Mod: CPTII,S$GLB,, | Performed by: STUDENT IN AN ORGANIZED HEALTH CARE EDUCATION/TRAINING PROGRAM

## 2024-11-11 PROCEDURE — 3072F LOW RISK FOR RETINOPATHY: CPT | Mod: CPTII,95,, | Performed by: FAMILY MEDICINE

## 2024-11-11 RX ORDER — METFORMIN HYDROCHLORIDE 500 MG/1
1000 TABLET ORAL 2 TIMES DAILY WITH MEALS
Qty: 360 TABLET | Refills: 2 | Status: SHIPPED | OUTPATIENT
Start: 2024-11-11

## 2024-11-11 RX ORDER — SEMAGLUTIDE 1.34 MG/ML
1 INJECTION, SOLUTION SUBCUTANEOUS
Qty: 9 ML | Refills: 0 | OUTPATIENT
Start: 2024-11-11

## 2024-11-11 RX ORDER — SEMAGLUTIDE 1.34 MG/ML
1 INJECTION, SOLUTION SUBCUTANEOUS
Qty: 9 ML | Refills: 1 | Status: SHIPPED | OUTPATIENT
Start: 2024-11-11

## 2024-11-11 RX ORDER — AMLODIPINE BESYLATE AND BENAZEPRIL HYDROCHLORIDE 2.5; 1 MG/1; MG/1
1 CAPSULE ORAL DAILY
Qty: 90 CAPSULE | Refills: 3 | Status: SHIPPED | OUTPATIENT
Start: 2024-11-11

## 2024-11-11 RX ORDER — ATORVASTATIN CALCIUM 40 MG/1
40 TABLET, FILM COATED ORAL NIGHTLY
Qty: 90 TABLET | Refills: 3 | Status: SHIPPED | OUTPATIENT
Start: 2024-11-11

## 2024-11-11 RX ADMIN — BETAMETHASONE SODIUM PHOSPHATE AND BETAMETHASONE ACETATE 6 MG: 3; 3 INJECTION, SUSPENSION INTRA-ARTICULAR; INTRALESIONAL; INTRAMUSCULAR; SOFT TISSUE at 01:11

## 2024-11-11 NOTE — PROGRESS NOTES
"Hand Surgery Clinic Note        CHIEF COMPLAINT:  - May presents today for initial evaluation of bilateral thumb pain and swelling, with the right side being worse than the left.    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    May presents with concerns about swelling and pain in both hands, particularly in the thumbs, with the right hand being worse. These symptoms have been present for a few years. She describes intermittent pain and difficulty opening jars due to the hand pain. To alleviate the pain, she reports shaking or rubbing the affected area.    She describes swelling and a "knot" in both thumbs, with the right being more severe. The pain is intermittent and can occur even when she is at rest.    Dr. Preston previously prescribed methotrexate, folic acid, and Celebrex for her condition. She also uses Voltaren gel at night, applying it to both hands up to about the wrist area. A previous steroid injection was administered by Dr. Evans several years ago, which she describes as extremely painful but provided some relief.    May reports a family history of similar hand issues, with her mother experiencing severe symptoms that caused her hands to be drawn closed. All four siblings, including the patient, have hand arthritis. She also mentions having arthritis in other parts of the body, particularly the back.    May denies any numbness in the hands. No night pain.           Review of Systems  Review of systems negative for chest pain, shortness of breath, fevers, chills, nausea/vomiting.    Past Medical History  Past Medical History:   Diagnosis Date    Arthritis     Diabetes mellitus     Herniated disc, cervical     Hypertension     Immunocompromised state due to drug therapy 12/13/2021    From rheumatologic meds.    Seronegative rheumatoid arthritis 12/13/2021    Rheumatologist: Dr. Domingo Gonzalez     " Type 2 diabetes mellitus with diabetic polyneuropathy, without long-term current use of insulin 3/29/2021    Type 2 diabetes mellitus, without long-term current use of insulin 3/29/2021       Past Surgical History  Past Surgical History:   Procedure Laterality Date    BREAST BIOPSY      BREAST SURGERY       SECTION      CHOLECYSTECTOMY      DILATION AND CURETTAGE OF UTERUS      GALLBLADDER SURGERY      HYSTEROSCOPY      JOINT REPLACEMENT      NECK SURGERY  2020    peripheral artery angioplasty      TOTAL KNEE ARTHROPLASTY      L    TUBAL LIGATION         Allergies  Review of patient's allergies indicates:   Allergen Reactions    Avelox [moxifloxacin]        Family History  Family History   Problem Relation Name Age of Onset    Diabetes Mother parth barajas     Arthritis Mother parth barajas     Hypertension Mother parth barajas     Miscarriages / Stillbirths Mother parth barajas     Osteoarthritis Sister Judy Zavala     Ovarian cancer Sister Ritika Jean        Social History  Social History     Socioeconomic History    Marital status:    Tobacco Use    Smoking status: Never     Passive exposure: Past    Smokeless tobacco: Never   Substance and Sexual Activity    Alcohol use: Yes     Comment: 1 or 2  [place] every 3 or 4 months    Drug use: No    Sexual activity: Not Currently     Partners: Male     Birth control/protection: None     Social Drivers of Health     Financial Resource Strain: Low Risk  (2024)    Overall Financial Resource Strain (CARDIA)     Difficulty of Paying Living Expenses: Not hard at all   Food Insecurity: No Food Insecurity (2024)    Hunger Vital Sign     Worried About Running Out of Food in the Last Year: Never true     Ran Out of Food in the Last Year: Never true   Transportation Needs: No Transportation Needs (3/19/2024)    PRAPARE - Transportation     Lack of Transportation (Medical): No     Lack of Transportation (Non-Medical): No   Physical  Activity: Unknown (11/8/2024)    Exercise Vital Sign     Days of Exercise per Week: 2 days   Stress: No Stress Concern Present (11/8/2024)    [employer] - Occupational Stress Questionnaire     Feeling of Stress : Not at all   Housing Stability: Low Risk  (3/19/2024)    Housing Stability Vital Sign     Unable to Pay for Housing in the Last Year: No     Number of Places Lived in the Last Year: 1     Unstable Housing in the Last Year: No       Vital Signs  There were no vitals filed for this visit.    Physical Exam  Constitutional: Appears well-developed and well-nourished. No distress.   HENT:   Head: Normocephalic.   Eyes: EOM are normal.   Pulmonary/Chest: Effort normal.   Neurological: Oriented to person, place, and time.   Psychiatric: Normal mood and affect.     Right Upper Extremity:  No abrasions, lacerations, wounds.  No swelling.  No erythema.  No tenderness over the 1st dorsal extensor compartment.  Negative Finkelstein's.  + tenderness over the thumb CMC joint.  Pain with CMC grind but no crepitus.  Positive thumb adduction and extension tests.  5° thumb MCP hyperextension.  No tenderness over the A1 pulleys of all 5 fingers.  No triggering with range of motion.  Patient is able to make a fist and extend all 5 fingers.  Sensation is intact in the median, radial, ulnar nerve distributions.  Palpable radial pulse.    Left Upper Extremity:  No abrasions, lacerations, wounds.  No swelling.  No erythema.  No tenderness over the 1st dorsal extensor compartment.  Negative Finkelstein's.  + tenderness over the thumb CMC joint.  Pain with CMC grind but no crepitus.  Positive thumb adduction and extension tests.  15° thumb MCP hyperextension.  No tenderness over the A1 pulleys of all 5 fingers.  No triggering with range of motion.  Patient is able to make a fist and extend all 5 fingers.  Sensation is intact in the median, radial, ulnar nerve distributions.  Palpable radial pulse.            Imaging  Patient did not obtain hand x-rays today.  She did obtain an MRI of both hands and wrist f per rheumatoid protocol which shows arthritic changes at the thumb CMC joint with a associated cystic changes at the base of the 1st metacarpals bilaterally.    Assessment and Plan  69-year-old right-hand dominant female presents with bilateral thumb CMC arthritis, right more painful than left.  She has been symptomatic for over 2 years.    The nature and cause of thumb carpometacarpal osteoarthritis (thumb CMC arthritis) was discussed.  It was noted that the fundamental cause relates mostly to genetic patterns underlying the natural biology of aging.  In women, in whom thumb CMC arthritis is more common, the influence of hormonal effects on ligament stability was noted.  The usual age of presentation and gradual development of symptoms was noted.  It was noted that in most cases, specific activities and trauma play a smaller role in influencing the progression and symptom patterns for thumb CMC arthritis.  Treatment options were discussed and noted to range from non-steroidal anti-inflammatory medication, use of a variety of specialty splints (both over-the-counter generic splints and those fabricated by certified hand therapy specialists), and possibly occupational therapy.  Treatment options also include the use of steroid injections for the affected joint, activity modifications, and ultimately surgical treatments.  Surgical treatments were noted to be the most long-lasting treatment, often not requiring future revision surgery, and usually not associated with changing any lifestyle or activity choices of the patient.  The decision to pursue surgery for treatment of thumb CMC arthritis was noted to primarily be a personal, quality-of-life, decision by the patient and is less so a medically mandated pathway.  It was also noted that while radiographs of the thumb CMC joint are helpful in correlating the  patient's complaints with actual joint degeneration, many times there is a lack of exact association between the severity of radiographic findings and the degree of patient symptoms and impairment (such as pain and interference with activities).  A potential downside of waiting too long to pursue surgery was noted to be both prolonging the suffering or impact on lifestyle as well as the possible progression of adjacent deformity of the thumb metacarpophalangeal joint.  Loss of  strength following surgery was noted to be a possible downside of surgical treatment although most patients are not meaningfully impacted by the amount of  strength loss following surgical treatment because pain relief is such a benefit.    I provided patient with a handout to read further on this pathology.  Patient was fitted for bilateral CMC controller plus braces to be worn as needed during functional activities. At least 10 minutes were spent sizing, fitting, and educating for durable medical equipment application today.  This service was performed under the direction of Renetta Pal MD.  CPT 82597.      Lastly, I have recommended bilateral thumb CMC steroid injections to see if this will improve or resolve her symptoms.  Patient agreed to proceed.  She tolerated procedure well.  See procedure note.  Discussed that I it can take up to 2 weeks for the injections to start working.    Follow up in clinic as needed if symptoms recur or do not resolve.         This note was generated with the assistance of ambient listening technology. Verbal consent was obtained by the patient and accompanying visitor(s) for the recording of patient appointment to facilitate this note. I attest to having reviewed and edited the generated note for accuracy, though some syntax or spelling errors may persist. Please contact the author of this note for any clarification.       Renetta Pal MD  Orthopaedic Hand Surgery

## 2024-11-11 NOTE — PROGRESS NOTES
TELEMEDICINE VIRTUAL VIDEO VISIT  11/11/24 10:20 AM CST    Visit Type: Audiovisual    Patient's Location: May represents that they are located within the state [facility].    History of Present Illness    CHIEF COMPLAINT:  May presents today for review of recent test results and current medications.    LAB RESULTS:  She reports reviewing her recent lab results. Her hemoglobin A1c was 5.3%, indicating excellent blood sugar control. Her urine microalbumin test and comprehensive metabolic panel were normal, including kidney function, liver function, electrolytes, and protein levels. She denies having any questions regarding her lab results.    BLOOD PRESSURE:  She reports her blood pressure has been great when checked at medical appointments. She denies checking her blood pressure at home but expresses willingness to obtain a blood pressure machine for home monitoring if needed.    CURRENT MEDICATIONS:  She is currently taking amlodipine/benazepril 2.5/10 mg daily for blood pressure, atorvastatin 40 mg daily in the evening for high cholesterol, metformin 500 mg two tablets twice daily with meals for diabetes, and Ozempic 1 mg weekly for diabetes. She reports no problems with current medications.    FAMILY HISTORY:  She reports a family history of thyroid issues, including her mother, older sister, and niece. She denies any family history of thyroid cancer or multiple endocrine neoplasia. She has not noticed any thyroid nodules.    RECENT PROCEDURES:  She reports having undergone recent cataract surgery on both eyes. She has a follow-up visit scheduled with the ophthalmologist at the end of the month.    PENDING TESTS:  She reports that a scheduled stress test was canceled last week due to lack of insurance approval. She expresses the need to follow up on this matter.    UPCOMING APPOINTMENTS:  She has an appointment with the hand specialist today at 12:30 PM, following MRI orders for both hands. She also reports  an upcoming appointment with her eye doctor in six months, which she plans to have her nurse schedule. She confirms she has seen her eye doctor within the last year.       Review of Systems   Constitutional:  Negative for activity change and unexpected weight change.   HENT:  Negative for hearing loss, rhinorrhea and trouble swallowing.    Eyes:  Negative for discharge and visual disturbance.   Respiratory:  Negative for chest tightness and wheezing.    Cardiovascular:  Negative for chest pain and palpitations.   Gastrointestinal:  Negative for blood in stool, constipation, diarrhea and vomiting.   Endocrine: Negative for polydipsia and polyuria.   Genitourinary:  Negative for difficulty urinating, dysuria, hematuria and menstrual problem.   Musculoskeletal:  Positive for arthralgias. Negative for neck pain.   Neurological:  Negative for weakness and headaches.   Psychiatric/Behavioral:  Negative for confusion and dysphoric mood.        Assessment & Plan    E66.01 Morbid (severe) obesity due to excess calories  M79.641 Pain in right hand  Z01.818 Encounter for other preprocedural exam  I10 Essential (primary) hypertension  E11.9 Type 2 diabetes mellitus without complications  M79.642 Pain in left hand  Z96.1 Presence of intraocular lens  E78.5 Hyperlipidemia, unspecified     Reviewed recent lab results: hemoglobin A1c 5.3%, normal urine microalbumin, and normal comprehensive metabolic panel   Assessed blood pressure control based on historical numbers; most recent reading slightly elevated but acceptable   Evaluated effectiveness of current medication regimen for hypertension, hyperlipidemia, and diabetes   Noted patient's weight trend: slight decrease from 170 lbs (1.5 years ago) to current 166 lbs; BMI in overweight category   Confirmed no history or family history of thyroid cancer or multiple endocrine neoplasia   Acknowledged recent cataract surgeries performed by Dr. Vera   Recognized need for diabetic eye  exam documentation    PATIENT EDUCATION:   Explained significance of recent lab results, including hemoglobin A1c, urine microalbumin, and comprehensive metabolic panel   Educated on importance of home blood pressure monitoring    ACTION ITEMS/LIFESTYLE:   May to obtain a home blood pressure monitor (automatic, upper arm type)   May to print out, complete, sign, and return the diabetic eye exam form sent via patient portal    MEDICATIONS:   Continued amlodipine/benazepril 2.5/10 mg daily for hypertension   Continued atorvastatin 40 mg daily in the evening for hyperlipidemia   Continued metformin 500 mg, 2 tablets twice daily with meals for diabetes   Continued Ozempic 1 mg every 7 days for diabetes   Refilled all current medications    ORDERS:   EKG ordered (noted as overdue)    FOLLOW UP:   Follow up in 6 months with claudette Berry advanced practice partner   Follow up in 6 months for labs   Contact care coordinator regarding diabetic eye exam documentation       1. Hypertension associated with diabetes  -     amlodipine-benazepril 2.5-10 mg (LOTREL) 2.5-10 mg per capsule; Take 1 capsule by mouth once daily.  Dispense: 90 capsule; Refill: 3    2. Hypertension associated with diabetes  Comments:  Pt BP is well controlled  Orders:  -     amlodipine-benazepril 2.5-10 mg (LOTREL) 2.5-10 mg per capsule; Take 1 capsule by mouth once daily.  Dispense: 90 capsule; Refill: 3    3. Hyperlipidemia associated with type 2 diabetes mellitus  -     atorvastatin (LIPITOR) 40 MG tablet; Take 1 tablet (40 mg total) by mouth every evening.  Dispense: 90 tablet; Refill: 3    4. Type 2 diabetes mellitus with other specified complication, without long-term current use of insulin  -     metFORMIN (GLUCOPHAGE) 500 MG tablet; Take 2 tablets (1,000 mg total) by mouth 2 (two) times daily with meals.  Dispense: 360 tablet; Refill: 2    5. Type 2 diabetes mellitus with diabetic polyneuropathy, without long-term current use of  "insulin  Overview:  No history or family history of thyroid cancer or multiple endocrine neoplasia syndrome.     Orders:  -     metFORMIN (GLUCOPHAGE) 500 MG tablet; Take 2 tablets (1,000 mg total) by mouth 2 (two) times daily with meals.  Dispense: 360 tablet; Refill: 2  -     semaglutide (OZEMPIC) 1 mg/dose (4 mg/3 mL); Inject 1 mg into the skin every 7 days.  Dispense: 9 mL; Refill: 1    6. Overweight (BMI 25.0-29.9)  Assessment & Plan:  Estimated body mass index is 29.41 kg/m² as calculated from the following:    Height as of 10/28/24: 5' 3" (1.6 m).    Weight as of 10/28/24: 75.3 kg (166 lb 0.1 oz).      No other significant complaints or concerns were reported.  Today's visit involved the intricate management of episodic problem(s) and the ongoing care for the patient's serious or complex condition(s) listed above, reflecting the inherent complexity of providing longitudinal, comprehensive evaluation and management as the central hub for the patient's primary care services.  There were no vitals filed for this visit.  PHYSICAL EXAM:  GENERAL APPEARANCE:  - Alert and grossly oriented.  - No apparent distress, breathing comfortably.     EYES:  - Sclera without icterus.     EARS, NOSE, AND THROAT:  - No visible abnormalities.     RESPIRATORY:  - No respiratory distress.  - No audible wheezing or cough.     PSYCHIATRIC:  - Mood and affect appropriate; behavior cooperative.    This note was generated with the assistance of ambient listening technology. Verbal consent was obtained by the patient and accompanying visitor(s) for the recording of patient appointment to facilitate this note. I attest to having reviewed and edited the generated note for accuracy, though some syntax or spelling errors may persist. Please contact the author of this note for any clarification.      I spent a total of 20 minutes today evaluating and managing this patient for this encounter.  This includes face to face time and non-face to " "face time preparing to see the patient (eg, review of tests), obtaining and/or reviewing separately obtained history, documenting clinical information in the electronic or other health record, independently interpreting results and communicating results to the patient/family/caregiver, or care coordinator.  This time was exclusive of any separately billable procedures for this patient and exclusive of time spent treating any other patient.    Documentation entered by me for this encounter may have been done in part using speech-recognition technology. Although I have made an effort to ensure accuracy, "sound like" errors may exist and should be interpreted in context.    Each patient to whom medical services are provided by telemedicine is: (1) informed of the relationship between the physician and patient and the respective role of any other health care provider with respect to management of the patient; and (2) notified that he or she may decline to receive medical services by telemedicine and may withdraw from such care at any time.     WRAP-UP INSTRUCTIONS  Schedule OV with Josiah in early May.  Schedule fasting labs a few days before that. (Standing Orders: Hemoglobin A1C, Comprehensive Metabolic Panel, Lipid Panel)    FOR FOLLOW-UP AT NEXT APPOINTMENT  @[facility]: Review lab results. Ensure BP controlled. Handle refills. Close care gaps. F/U with me in 6 months. Schedule labs a few days before that. (Standing Orders: Hemoglobin A1C, Comprehensive Metabolic Panel, Lipid Panel, Urine Microalbumin/Creatinine Ratio)  "

## 2024-11-11 NOTE — ASSESSMENT & PLAN NOTE
"Estimated body mass index is 29.41 kg/m² as calculated from the following:    Height as of 10/28/24: 5' 3" (1.6 m).    Weight as of 10/28/24: 75.3 kg (166 lb 0.1 oz).  "

## 2024-11-11 NOTE — LETTER
"  May Carl.    I appreciate your help getting the information we need to keep your record up to date.    INSTRUCTIONS:  Print this document and find the page with the header "AUTHORIZATION FOR RELEASE OF CONFIDENTIAL INFORMATION."  In the "RECORDS REQUESTED FROM" section on that page, please enter the complete name of the doctor (or nurse practitioner or physician assistant or clinic) who ordered the tests or has the records described in the "SPECIFIC RECORDS REQUESTED" listed right below. Please include their address and phone number if possible.  Sign and date the bottom of the form.  Reply to this message with a copy of the signed form attached.  We get the best quality document image if you can scan the signed form and send it as a PDF document.  Otherwise, you can take a good-quality photo of the document and send the photo image.  Here are two [employer] videos demonstrating how to attach an image or document to a [employer] message.  DESKTOP: [email or web address]  MOBILE: [email or web address]    Thanks for letting us care for you, and thanks for trusting [facility] with your healthcare needs.    All the best,    JAMES Lane MD            ATTN: CARE COORDINATION   PATIENT IDENTIFYING INFORMATION:  MAY HERRERA   [address]  [place] [address] YOB: 1955  OUR MRN: [id number]   Home Phone [phone]   Work Phone [phone]   Mobile [phone]        RECORDS REQUESTED FROM:   NAME OF HOSPITAL PHYSICIAN OR OTHER ENTITY       ADDRESS PHONE         SPECIFIC RECORDS REQUESTED:  diabetic eye exam reports within the last 12 months    DATES OF SERVICE REQUESTED: 5 years prior to date signed through the present date (unless specified differently above)    AUTHORIZATION:  For the purpose of continuity of care, IMay, hereby authorize the hospital, physician, or entity named above to release my medical records for the dates of service specified above to: YAHAIRA Shields " MD Domingo; [facility]; [address]; [place] [address]; PH: [phone]    REQUESTED METHOD OF DELIVERY: Fax ([phone]) or secure Email ([email or web address])    In authorizing the release of the confidential information identified above, I hereby waive all restrictions or privileges imposed by law and release [facility] and Its affiliates and their staff from any restriction or privilege Imposed by law in connection with the disclosure or release of any professional record, observation or communication. I do understand that the information that is being released may be subject to re-disclosure by the recipient and may no longer be protected. I understand that my treatment, payment, enrollment or eligibility for benefits may not be conditioned on signing this authorization.  This authorization may be revoked in writing at any time, except to the extent that [facility] and its affiliates have already taken action in reliance on it. Letters to revoke this authorization should be addressed to [facility], Release of Information Department, [address] [place] [id number].     If not previously revoked in writing, this authorization will terminate or  36 months after the date signed below.                   Signature of Patient    Date Signed

## 2024-11-11 NOTE — Clinical Note
Schedule OV with Josiah in early May. Schedule fasting labs a few days before that. (Standing Orders: Hemoglobin A1C, Comprehensive Metabolic Panel, Lipid Panel)

## 2024-11-11 NOTE — TELEPHONE ENCOUNTER
No care due was identified.  Health [facility] Embedded Care Due Messages. Reference number: [id number].   11/11/2024 9:29:06 AM CST

## 2024-11-11 NOTE — Clinical Note
Isatu Carl.  Would you please F/U with her for requesting DM eye exam? (See Patient Letter.)  Thank you for your help caring for our patients!  -ALMAZ

## 2024-11-13 NOTE — PROCEDURES
Small Joint Aspiration/Injection: R thumb CMC    Date/Time: 11/11/2024 1:00 PM    Performed by: Renetta Pal MD  Authorized by: Renetta Pal MD    Consent Done?:  Yes (Verbal)  Indications:  Arthritis and pain  Timeout: prior to procedure the correct patient, procedure, and site was verified    Local anesthesia used?: Yes    Anesthesia:  Local infiltration  Local anesthetic:  Lidocaine 1% without epinephrine  Anesthetic total (ml):  1    Location:  Thumb  Site:  R thumb CMC  Needle size:  25 G  Approach:  Dorsal  Medications:  6 mg betamethasone acetate-betamethasone sodium phosphate 6 mg/mL  Patient tolerance:  Patient tolerated the procedure well with no immediate complications

## 2024-11-13 NOTE — PROCEDURES
Small Joint Aspiration/Injection: L thumb CMC    Date/Time: 11/11/2024 1:00 PM    Performed by: Renetta Pal MD  Authorized by: Renetta Pal MD    Consent Done?:  Yes (Verbal)  Indications:  Arthritis and pain  Timeout: prior to procedure the correct patient, procedure, and site was verified    Local anesthesia used?: Yes    Anesthesia:  Local infiltration  Local anesthetic:  Lidocaine 1% without epinephrine  Anesthetic total (ml):  1    Location:  Thumb  Site:  L thumb CMC  Ultrasonic guidance for needle placement?: No    Needle size:  25 G  Approach:  Dorsal  Medications:  6 mg betamethasone acetate-betamethasone sodium phosphate 6 mg/mL  Patient tolerance:  Patient tolerated the procedure well with no immediate complications

## 2024-11-14 RX ORDER — BETAMETHASONE SODIUM PHOSPHATE AND BETAMETHASONE ACETATE 3; 3 MG/ML; MG/ML
6 INJECTION, SUSPENSION INTRA-ARTICULAR; INTRALESIONAL; INTRAMUSCULAR; SOFT TISSUE
Status: DISCONTINUED | OUTPATIENT
Start: 2024-11-11 | End: 2024-11-14 | Stop reason: HOSPADM

## 2025-01-17 DIAGNOSIS — M06.00 SERONEGATIVE RHEUMATOID ARTHRITIS: ICD-10-CM

## 2025-01-20 RX ORDER — METHOTREXATE 2.5 MG/1
15 TABLET ORAL
Qty: 72 TABLET | Refills: 1 | Status: SHIPPED | OUTPATIENT
Start: 2025-01-20 | End: 2025-04-20

## 2025-01-28 ENCOUNTER — LAB VISIT (OUTPATIENT)
Dept: LAB | Facility: HOSPITAL | Age: 70
End: 2025-01-28
Attending: PHYSICIAN ASSISTANT
Payer: MEDICARE

## 2025-01-28 DIAGNOSIS — Z79.899 DRUG-INDUCED IMMUNODEFICIENCY: ICD-10-CM

## 2025-01-28 DIAGNOSIS — M06.00 SERONEGATIVE RHEUMATOID ARTHRITIS: ICD-10-CM

## 2025-01-28 DIAGNOSIS — Z51.81 ENCOUNTER FOR MEDICATION MONITORING: ICD-10-CM

## 2025-01-28 DIAGNOSIS — D84.821 DRUG-INDUCED IMMUNODEFICIENCY: ICD-10-CM

## 2025-01-28 LAB
ALBUMIN SERPL BCP-MCNC: 3.7 G/DL (ref 3.5–5.2)
ALP SERPL-CCNC: 48 U/L (ref 40–150)
ALT SERPL W/O P-5'-P-CCNC: 15 U/L (ref 10–44)
ANION GAP SERPL CALC-SCNC: 11 MMOL/L (ref 8–16)
AST SERPL-CCNC: 15 U/L (ref 10–40)
BASOPHILS # BLD AUTO: 0.05 K/UL (ref 0–0.2)
BASOPHILS NFR BLD: 1 % (ref 0–1.9)
BILIRUB SERPL-MCNC: 0.4 MG/DL (ref 0.1–1)
BUN SERPL-MCNC: 17 MG/DL (ref 8–23)
CALCIUM SERPL-MCNC: 9.8 MG/DL (ref 8.7–10.5)
CHLORIDE SERPL-SCNC: 107 MMOL/L (ref 95–110)
CO2 SERPL-SCNC: 24 MMOL/L (ref 23–29)
CREAT SERPL-MCNC: 0.6 MG/DL (ref 0.5–1.4)
CRP SERPL-MCNC: 1 MG/L (ref 0–8.2)
DIFFERENTIAL METHOD BLD: ABNORMAL
EOSINOPHIL # BLD AUTO: 0.3 K/UL (ref 0–0.5)
EOSINOPHIL NFR BLD: 4.9 % (ref 0–8)
ERYTHROCYTE [DISTWIDTH] IN BLOOD BY AUTOMATED COUNT: 13.2 % (ref 11.5–14.5)
ERYTHROCYTE [SEDIMENTATION RATE] IN BLOOD BY WESTERGREN METHOD: 4 MM/HR (ref 0–36)
EST. GFR  (NO RACE VARIABLE): >60 ML/MIN/1.73 M^2
GLUCOSE SERPL-MCNC: 91 MG/DL (ref 70–110)
HCT VFR BLD AUTO: 38.4 % (ref 37–48.5)
HGB BLD-MCNC: 12.5 G/DL (ref 12–16)
IMM GRANULOCYTES # BLD AUTO: 0.01 K/UL (ref 0–0.04)
IMM GRANULOCYTES NFR BLD AUTO: 0.2 % (ref 0–0.5)
LYMPHOCYTES # BLD AUTO: 1.6 K/UL (ref 1–4.8)
LYMPHOCYTES NFR BLD: 30.3 % (ref 18–48)
MCH RBC QN AUTO: 31.6 PG (ref 27–31)
MCHC RBC AUTO-ENTMCNC: 32.6 G/DL (ref 32–36)
MCV RBC AUTO: 97 FL (ref 82–98)
MONOCYTES # BLD AUTO: 0.5 K/UL (ref 0.3–1)
MONOCYTES NFR BLD: 9.8 % (ref 4–15)
NEUTROPHILS # BLD AUTO: 2.8 K/UL (ref 1.8–7.7)
NEUTROPHILS NFR BLD: 53.8 % (ref 38–73)
NRBC BLD-RTO: 0 /100 WBC
PLATELET # BLD AUTO: 268 K/UL (ref 150–450)
PMV BLD AUTO: 9.9 FL (ref 9.2–12.9)
POTASSIUM SERPL-SCNC: 4.8 MMOL/L (ref 3.5–5.1)
PROT SERPL-MCNC: 6.7 G/DL (ref 6–8.4)
RBC # BLD AUTO: 3.96 M/UL (ref 4–5.4)
SODIUM SERPL-SCNC: 142 MMOL/L (ref 136–145)
WBC # BLD AUTO: 5.12 K/UL (ref 3.9–12.7)

## 2025-01-28 PROCEDURE — 86140 C-REACTIVE PROTEIN: CPT | Performed by: INTERNAL MEDICINE

## 2025-01-28 PROCEDURE — 80053 COMPREHEN METABOLIC PANEL: CPT | Performed by: INTERNAL MEDICINE

## 2025-01-28 PROCEDURE — 85651 RBC SED RATE NONAUTOMATED: CPT | Performed by: INTERNAL MEDICINE

## 2025-01-28 PROCEDURE — 85025 COMPLETE CBC W/AUTO DIFF WBC: CPT | Performed by: INTERNAL MEDICINE

## 2025-01-28 PROCEDURE — 36415 COLL VENOUS BLD VENIPUNCTURE: CPT | Mod: PO | Performed by: INTERNAL MEDICINE

## 2025-02-06 ENCOUNTER — PATIENT MESSAGE (OUTPATIENT)
Dept: RHEUMATOLOGY | Facility: CLINIC | Age: 70
End: 2025-02-06
Payer: MEDICARE

## 2025-02-17 ENCOUNTER — OFFICE VISIT (OUTPATIENT)
Dept: INTERNAL MEDICINE | Facility: CLINIC | Age: 70
End: 2025-02-17
Payer: MEDICARE

## 2025-02-17 VITALS
BODY MASS INDEX: 29.41 KG/M2 | TEMPERATURE: 98 F | HEIGHT: 63 IN | WEIGHT: 166 LBS | OXYGEN SATURATION: 97 % | DIASTOLIC BLOOD PRESSURE: 80 MMHG | HEART RATE: 88 BPM | SYSTOLIC BLOOD PRESSURE: 122 MMHG

## 2025-02-17 DIAGNOSIS — K14.0 TONGUE ULCERATION: Primary | ICD-10-CM

## 2025-02-17 DIAGNOSIS — E11.42 TYPE 2 DIABETES MELLITUS WITH DIABETIC POLYNEUROPATHY, WITHOUT LONG-TERM CURRENT USE OF INSULIN: ICD-10-CM

## 2025-02-17 PROCEDURE — 3074F SYST BP LT 130 MM HG: CPT | Mod: CPTII,S$GLB,, | Performed by: NURSE PRACTITIONER

## 2025-02-17 PROCEDURE — 3079F DIAST BP 80-89 MM HG: CPT | Mod: CPTII,S$GLB,, | Performed by: NURSE PRACTITIONER

## 2025-02-17 RX ORDER — PNV NO.95/FERROUS FUM/FOLIC AC 28MG-0.8MG
100 TABLET ORAL DAILY
COMMUNITY

## 2025-02-17 RX ORDER — NYSTATIN 100000 [USP'U]/ML
6 SUSPENSION ORAL 4 TIMES DAILY
Qty: 240 ML | Refills: 0 | Status: SHIPPED | OUTPATIENT
Start: 2025-02-17 | End: 2025-02-27

## 2025-02-17 NOTE — PROGRESS NOTES
"Subjective:       Patient ID: May Montoya is a 69 y.o. female.    CHIEF COMPLAINT:  - Ms. Montoya presents with a sore on her tongue that has been present for about a week.    HPI:  Ms. Montoya reports noticing a sore spot on her tongue approximately 1 week ago. She initially thought it might be an ulcer, as she has had ulcers on the side of her tongue before. This particular sore is unlike anything she has had previously. She denies any history of biting her tongue. The sore is not bleeding. She also mentions having a sore spot on her neck and a sore tonsil, all on the right side.    Over the past week, the sore on her tongue has increased in size. Yesterday, it appeared to be darkening in color, which she interpreted as a sign of healing. This morning it appears inflamed again. She wears a mouth guard at night due to teeth grinding, and she noticed it was rubbing against the sore, causing her to adjust her bite to avoid further irritation. She also wears a partial denture on her bottom teeth, which she removes at night.    The sore hurts when she brushes her teeth, causing her to try to move her tongue out of the way during brushing. Sometimes her partial denture rubs against the sore. The mouthwash she typically uses causes a burning sensation when used.    She is concerned about the possibility of tongue cancer, which she discovered when researching symptoms online. She is particularly anxious due to her 's past death from bladder cancer.        /80 (Patient Position: Sitting)   Pulse 88   Temp 97.7 °F (36.5 °C) (Tympanic)   Ht 5' 3" (1.6 m)   Wt 75.3 kg (166 lb 0.1 oz)   LMP  (LMP Unknown)   SpO2 97%   BMI 29.41 kg/m²     Review of Systems   Constitutional:  Negative for activity change, appetite change, chills, diaphoresis, fatigue, fever and unexpected weight change.   HENT:  Positive for dental problem.         Tongue ulcer   Respiratory:  Negative for cough and shortness of breath.  "   Cardiovascular:  Negative for chest pain, palpitations and leg swelling.   Gastrointestinal: Negative.    Genitourinary: Negative.    Musculoskeletal: Negative.    Skin:  Negative for color change, pallor, rash and wound.   Allergic/Immunologic: Negative for immunocompromised state.   Neurological: Negative.  Negative for dizziness and facial asymmetry.   Hematological:  Negative for adenopathy. Does not bruise/bleed easily.   Psychiatric/Behavioral:  Negative for agitation, behavioral problems and confusion.        Objective:      Physical Exam  Vitals and nursing note reviewed.   Constitutional:       General: She is not in acute distress.     Appearance: Normal appearance. She is well-developed. She is not diaphoretic.   HENT:      Head: Normocephalic and atraumatic.      Mouth/Throat:      Tongue: Lesions present.      Tonsils: No tonsillar exudate or tonsillar abscesses.      Comments: See photo  Cardiovascular:      Rate and Rhythm: Normal rate and regular rhythm.      Heart sounds: Normal heart sounds. No murmur heard.  Pulmonary:      Effort: Pulmonary effort is normal. No respiratory distress.      Breath sounds: Normal breath sounds.   Musculoskeletal:         General: Normal range of motion.   Skin:     General: Skin is warm and dry.      Findings: No rash.   Neurological:      Mental Status: She is alert.   Psychiatric:         Mood and Affect: Mood normal.         Behavior: Behavior normal. Behavior is cooperative.         Thought Content: Thought content normal.         Judgment: Judgment normal.         Assessment and Plan        May was seen today for patch on left side of tongue x 1 week.    Diagnoses and all orders for this visit:    Tongue ulceration  -     nystatin (MYCOSTATIN) 100,000 unit/mL suspension; Take 6 mLs (600,000 Units total) by mouth 4 (four) times daily. for 10 days    Type 2 diabetes mellitus with diabetic polyneuropathy, without long-term current use of insulin    BMI  29.0-29.9,adult      There are no Patient Instructions on file for this visit.      1. Tongue ulceration    2. Type 2 diabetes mellitus with diabetic polyneuropathy, without long-term current use of insulin    3. BMI 29.0-29.9,adult        Assessment & Plan    TYPE 2 DIABETES MELLITUS WITH OTHER CIRCULATORY COMPLICATIONS:  - Monitored the patient's blood sugar and evaluated recent labs, both indicating good blood sugar control.    TONGUE LESION:  - Evaluated concerning ulcer-like lesion on patient's tongue, present for 1 week and not improving.  - Considered differential diagnosis including tongue cancer.  - Examined for palpable lymph nodes, finding none.  - Documented lesion with picture for medical record.  - Opted for conservative management with prescription mouthwash and ENT referral.  - pt sees Dr. Glez- will make an appt    FOLLOW UP:  - ENT   - Follow up for worsening or no improvement in symptoms and PRN.                 This note was generated with the assistance of ambient listening technology. Verbal consent was obtained by the patient and accompanying visitor(s) for the recording of patient appointment to facilitate this note. I attest to having reviewed and edited the generated note for accuracy, though some syntax or spelling errors may persist. Please contact the author of this note for any clarification.

## 2025-02-22 DIAGNOSIS — M19.042 PRIMARY OSTEOARTHRITIS OF BOTH HANDS: ICD-10-CM

## 2025-02-22 DIAGNOSIS — M15.9 GENERALIZED OSTEOARTHRITIS OF MULTIPLE SITES: Chronic | ICD-10-CM

## 2025-02-22 DIAGNOSIS — M06.00 SERONEGATIVE RHEUMATOID ARTHRITIS: ICD-10-CM

## 2025-02-22 DIAGNOSIS — M19.041 PRIMARY OSTEOARTHRITIS OF BOTH HANDS: ICD-10-CM

## 2025-02-24 RX ORDER — CELECOXIB 200 MG/1
200 CAPSULE ORAL 2 TIMES DAILY
Qty: 180 CAPSULE | Refills: 1 | Status: SHIPPED | OUTPATIENT
Start: 2025-02-24

## 2025-04-15 DIAGNOSIS — E11.42 TYPE 2 DIABETES MELLITUS WITH DIABETIC POLYNEUROPATHY, WITHOUT LONG-TERM CURRENT USE OF INSULIN: Chronic | ICD-10-CM

## 2025-04-15 RX ORDER — SEMAGLUTIDE 1.34 MG/ML
1 INJECTION, SOLUTION SUBCUTANEOUS
Qty: 9 ML | Refills: 0 | Status: SHIPPED | OUTPATIENT
Start: 2025-04-15

## 2025-04-15 NOTE — TELEPHONE ENCOUNTER
No care due was identified.  Health [facility] Embedded Care Due Messages. Reference number: [id number].   4/15/2025 9:47:50 AM CDT

## 2025-04-15 NOTE — TELEPHONE ENCOUNTER
Refill Decision Note   May Jan  is requesting a refill authorization.  Brief Assessment and Rationale for Refill:  Approve     Medication Therapy Plan:         Comments:     Note composed:10:07 AM 04/15/2025

## 2025-05-05 ENCOUNTER — LAB VISIT (OUTPATIENT)
Dept: LAB | Facility: HOSPITAL | Age: 70
End: 2025-05-05
Attending: INTERNAL MEDICINE
Payer: MEDICARE

## 2025-05-05 DIAGNOSIS — Z51.81 ENCOUNTER FOR MEDICATION MONITORING: ICD-10-CM

## 2025-05-05 DIAGNOSIS — Z79.899 DRUG-INDUCED IMMUNODEFICIENCY: ICD-10-CM

## 2025-05-05 DIAGNOSIS — M06.00 SERONEGATIVE RHEUMATOID ARTHRITIS: ICD-10-CM

## 2025-05-05 DIAGNOSIS — D84.821 DRUG-INDUCED IMMUNODEFICIENCY: ICD-10-CM

## 2025-05-05 LAB
ABSOLUTE EOSINOPHIL (OHS): 0.21 K/UL
ABSOLUTE MONOCYTE (OHS): 0.44 K/UL (ref 0.3–1)
ABSOLUTE NEUTROPHIL COUNT (OHS): 2.35 K/UL (ref 1.8–7.7)
ALBUMIN SERPL BCP-MCNC: 3.6 G/DL (ref 3.5–5.2)
ALP SERPL-CCNC: 43 UNIT/L (ref 40–150)
ALT SERPL W/O P-5'-P-CCNC: 19 UNIT/L (ref 10–44)
ANION GAP (OHS): 9 MMOL/L (ref 8–16)
AST SERPL-CCNC: 16 UNIT/L (ref 11–45)
BASOPHILS # BLD AUTO: 0.04 K/UL
BASOPHILS NFR BLD AUTO: 0.9 %
BILIRUB SERPL-MCNC: 0.5 MG/DL (ref 0.1–1)
BUN SERPL-MCNC: 13 MG/DL (ref 8–23)
CALCIUM SERPL-MCNC: 9.1 MG/DL (ref 8.7–10.5)
CHLORIDE SERPL-SCNC: 106 MMOL/L (ref 95–110)
CO2 SERPL-SCNC: 27 MMOL/L (ref 23–29)
CREAT SERPL-MCNC: 0.6 MG/DL (ref 0.5–1.4)
CRP SERPL-MCNC: 1.4 MG/L
ERYTHROCYTE [DISTWIDTH] IN BLOOD BY AUTOMATED COUNT: 13.1 % (ref 11.5–14.5)
ERYTHROCYTE [SEDIMENTATION RATE] IN BLOOD BY PHOTOMETRIC METHOD: 7 MM/HR
GFR SERPLBLD CREATININE-BSD FMLA CKD-EPI: >60 ML/MIN/1.73/M2
GLUCOSE SERPL-MCNC: 92 MG/DL (ref 70–110)
HCT VFR BLD AUTO: 39.3 % (ref 37–48.5)
HGB BLD-MCNC: 13 GM/DL (ref 12–16)
IMM GRANULOCYTES # BLD AUTO: 0.01 K/UL (ref 0–0.04)
IMM GRANULOCYTES NFR BLD AUTO: 0.2 % (ref 0–0.5)
LYMPHOCYTES # BLD AUTO: 1.18 K/UL (ref 1–4.8)
MCH RBC QN AUTO: 31.6 PG (ref 27–31)
MCHC RBC AUTO-ENTMCNC: 33.1 G/DL (ref 32–36)
MCV RBC AUTO: 95 FL (ref 82–98)
NUCLEATED RBC (/100WBC) (OHS): 0 /100 WBC
PLATELET # BLD AUTO: 282 K/UL (ref 150–450)
PMV BLD AUTO: 9.8 FL (ref 9.2–12.9)
POTASSIUM SERPL-SCNC: 4.4 MMOL/L (ref 3.5–5.1)
PROT SERPL-MCNC: 6.6 GM/DL (ref 6–8.4)
RBC # BLD AUTO: 4.12 M/UL (ref 4–5.4)
RELATIVE EOSINOPHIL (OHS): 5 %
RELATIVE LYMPHOCYTE (OHS): 27.9 % (ref 18–48)
RELATIVE MONOCYTE (OHS): 10.4 % (ref 4–15)
RELATIVE NEUTROPHIL (OHS): 55.6 % (ref 38–73)
SODIUM SERPL-SCNC: 142 MMOL/L (ref 136–145)
WBC # BLD AUTO: 4.23 K/UL (ref 3.9–12.7)

## 2025-05-05 PROCEDURE — 36415 COLL VENOUS BLD VENIPUNCTURE: CPT | Mod: PO

## 2025-05-05 PROCEDURE — 85652 RBC SED RATE AUTOMATED: CPT

## 2025-05-05 PROCEDURE — 84460 ALANINE AMINO (ALT) (SGPT): CPT

## 2025-05-05 PROCEDURE — 86140 C-REACTIVE PROTEIN: CPT

## 2025-05-05 PROCEDURE — 85025 COMPLETE CBC W/AUTO DIFF WBC: CPT

## 2025-05-07 ENCOUNTER — TELEPHONE (OUTPATIENT)
Dept: INTERNAL MEDICINE | Facility: CLINIC | Age: 70
End: 2025-05-07
Payer: MEDICARE

## 2025-05-07 NOTE — TELEPHONE ENCOUNTER
LVM and sent my chart message that pt appointment will be canceled for Monday 5/12 due to provider not being in clinic.

## 2025-05-12 ENCOUNTER — OFFICE VISIT (OUTPATIENT)
Dept: RHEUMATOLOGY | Facility: CLINIC | Age: 70
End: 2025-05-12
Payer: MEDICARE

## 2025-05-12 VITALS
HEART RATE: 75 BPM | DIASTOLIC BLOOD PRESSURE: 81 MMHG | WEIGHT: 164.69 LBS | SYSTOLIC BLOOD PRESSURE: 127 MMHG | HEIGHT: 63 IN | BODY MASS INDEX: 29.18 KG/M2

## 2025-05-12 DIAGNOSIS — M19.041 PRIMARY OSTEOARTHRITIS OF BOTH HANDS: ICD-10-CM

## 2025-05-12 DIAGNOSIS — Z79.899 DRUG-INDUCED IMMUNODEFICIENCY: ICD-10-CM

## 2025-05-12 DIAGNOSIS — D84.821 DRUG-INDUCED IMMUNODEFICIENCY: ICD-10-CM

## 2025-05-12 DIAGNOSIS — Z51.81 ENCOUNTER FOR MEDICATION MONITORING: ICD-10-CM

## 2025-05-12 DIAGNOSIS — M15.9 GENERALIZED OSTEOARTHRITIS OF MULTIPLE SITES: ICD-10-CM

## 2025-05-12 DIAGNOSIS — M19.042 PRIMARY OSTEOARTHRITIS OF BOTH HANDS: ICD-10-CM

## 2025-05-12 DIAGNOSIS — M06.00 SERONEGATIVE RHEUMATOID ARTHRITIS: Primary | ICD-10-CM

## 2025-05-12 DIAGNOSIS — M18.0 ARTHRITIS OF CARPOMETACARPAL (CMC) JOINT OF BOTH THUMBS: ICD-10-CM

## 2025-05-12 PROCEDURE — 1159F MED LIST DOCD IN RCRD: CPT | Mod: CPTII,S$GLB,, | Performed by: INTERNAL MEDICINE

## 2025-05-12 PROCEDURE — 3288F FALL RISK ASSESSMENT DOCD: CPT | Mod: CPTII,S$GLB,, | Performed by: INTERNAL MEDICINE

## 2025-05-12 PROCEDURE — 99215 OFFICE O/P EST HI 40 MIN: CPT | Mod: S$GLB,,, | Performed by: INTERNAL MEDICINE

## 2025-05-12 PROCEDURE — 1126F AMNT PAIN NOTED NONE PRSNT: CPT | Mod: CPTII,S$GLB,, | Performed by: INTERNAL MEDICINE

## 2025-05-12 PROCEDURE — 1160F RVW MEDS BY RX/DR IN RCRD: CPT | Mod: CPTII,S$GLB,, | Performed by: INTERNAL MEDICINE

## 2025-05-12 PROCEDURE — G2211 COMPLEX E/M VISIT ADD ON: HCPCS | Mod: S$GLB,,, | Performed by: INTERNAL MEDICINE

## 2025-05-12 PROCEDURE — 3074F SYST BP LT 130 MM HG: CPT | Mod: CPTII,S$GLB,, | Performed by: INTERNAL MEDICINE

## 2025-05-12 PROCEDURE — 3079F DIAST BP 80-89 MM HG: CPT | Mod: CPTII,S$GLB,, | Performed by: INTERNAL MEDICINE

## 2025-05-12 PROCEDURE — 1101F PT FALLS ASSESS-DOCD LE1/YR: CPT | Mod: CPTII,S$GLB,, | Performed by: INTERNAL MEDICINE

## 2025-05-12 PROCEDURE — 99999 PR PBB SHADOW E&M-EST. PATIENT-LVL III: CPT | Mod: PBBFAC,,, | Performed by: INTERNAL MEDICINE

## 2025-05-12 PROCEDURE — 3008F BODY MASS INDEX DOCD: CPT | Mod: CPTII,S$GLB,, | Performed by: INTERNAL MEDICINE

## 2025-05-12 PROCEDURE — 4010F ACE/ARB THERAPY RXD/TAKEN: CPT | Mod: CPTII,S$GLB,, | Performed by: INTERNAL MEDICINE

## 2025-05-12 RX ORDER — METHOTREXATE 2.5 MG/1
20 TABLET ORAL
Qty: 96 TABLET | Refills: 1 | Status: SHIPPED | OUTPATIENT
Start: 2025-05-12

## 2025-05-12 NOTE — PROGRESS NOTES
RHEUMATOLOGY CLINIC FOLLOW UP VISIT  Chief complaints, HPI, ROS, EXAM, Assessment & Plans:-  May Brito a 69 y.o. pleasant female comes in for worsening pain.  She follows in the Rheumatology for seronegative rheumatoid treated with methotrexate complicated osteoarthritis.  Complains of worsening pain of bilateral hands especially at the base of bilateral thumbs associated with swelling and stiffness.  Received corticosteroid injection with orthopedic surgeon.  Using thumb braces which seems to improve her pain.  Rheumatological review of system negative otherwise.  Physical examination shows mild synovitis of base of bilateral thumb joints.  Synovial hypertrophy of multiple MCP joints but no significant tenderness.  1. Seronegative rheumatoid arthritis    2. Primary osteoarthritis of both hands    3. Arthritis of carpometacarpal (CMC) joint of both thumbs    4. Drug-induced immunodeficiency    5. Encounter for medication monitoring    6. Generalized osteoarthritis of multiple sites      Problem List Items Addressed This Visit       Generalized osteoarthritis of multiple sites (Chronic)    Seronegative rheumatoid arthritis - Primary (Chronic)    Overview   Rheumatologist: Dr. Domingo Gonzalez          Relevant Medications    methotrexate 2.5 MG Tab    Arthritis of carpometacarpal (CMC) joint of both thumbs    Drug-induced immunodeficiency    Overview   From rheumatologic meds.         Encounter for medication monitoring    Primary osteoarthritis of both hands       Labs reviewed today:-   Latest Reference Range & Units Most Recent   GARTH Screen Negative <1:80  Negative <1:80  6/15/21 11:38   CCP Antibodies <5.0 U/mL <0.5  6/15/21 11:38   RBC 4.00 - 5.40 M/uL 4.23  3/13/23 08:57   Hemoglobin 12.0 - 16.0 g/dL 13.2  3/13/23 08:57   Hematocrit 37.0 - 48.5 % 39.7  3/13/23 08:57   MCV 82 - 98 fL 94  3/13/23 08:57   RDW 11.5 - 14.5 % 12.2  3/13/23 08:57    Rheumatoid Factor 0.0 - 15.0 IU/mL <10.0  6/15/21 11:38      Latest Reference Range & Units 05/05/25 09:41   WBC 3.90 - 12.70 K/uL 4.23   RBC 4.00 - 5.40 M/uL 4.12   Hemoglobin 12.0 - 16.0 gm/dL 13.0   Hematocrit 37.0 - 48.5 % 39.3   MCV 82 - 98 fL 95   MCH 27.0 - 31.0 pg 31.6 (H)   MCHC 32.0 - 36.0 g/dL 33.1   RDW 11.5 - 14.5 % 13.1   Platelet Count 150 - 450 K/uL 282   MPV 9.2 - 12.9 fL 9.8   Neut % 38 - 73 % 55.6   Lymph % 18 - 48 % 27.9   Mono % 4 - 15 % 10.4   Eos % <=8 % 5.0   Basophil % <=1.9 % 0.9   Immature Granulocytes 0.0 - 0.5 % 0.2   Gran # (ANC) 1.8 - 7.7 K/uL 2.35   Lymph # 1 - 4.8 K/uL 1.18   Mono # 0.3 - 1 K/uL 0.44   Eos # <=0.5 K/uL 0.21   Baso # <=0.2 K/uL 0.04   Immature Grans (Abs) 0.00 - 0.04 K/uL 0.01   nRBC <=0 /100 WBC 0   Sed Rate <=36 mm/hr 7   Sodium 136 - 145 mmol/L 142   Potassium 3.5 - 5.1 mmol/L 4.4   Chloride 95 - 110 mmol/L 106   CO2 23 - 29 mmol/L 27   Anion Gap 8 - 16 mmol/L 9   BUN 8 - 23 mg/dL 13   Creatinine 0.5 - 1.4 mg/dL 0.6   eGFR >60 mL/min/1.73/m2 >60   Glucose 70 - 110 mg/dL 92   Calcium 8.7 - 10.5 mg/dL 9.1   ALP 40 - 150 unit/L 43   PROTEIN TOTAL 6.0 - 8.4 gm/dL 6.6   Albumin 3.5 - 5.2 g/dL 3.6   BILIRUBIN TOTAL 0.1 - 1.0 mg/dL 0.5   AST 11 - 45 unit/L 16   ALT 10 - 44 unit/L 19   CRP <=8.2 mg/L 1.4   (H): Data is abnormally high      Active seronegative RA with mild synovitis over MCP joints on MTX .  Increase dose of methotrexate to 15 mg weekly.  Continue daily folic acid.  Safety labs every 12 weeks while methotrexate therapy.  Severe Hand OA with synovitis. Continue celebrex, brace and prn CSI with .   Drug induced immunodeficiency due to use of immunosuppressive drugs. Monitor carefully for infections. Advised to get immediate medical care if any infection. Also advised strict adherence to age appropriate vaccinations and cancer screenings with PCP.  Hold methotrexate if any infection  I have explained all of the above in detail and the patient  understands all of the current recommendation(s). I have answered all questions to the best of my ability and to their complete satisfaction.       # Follow up in about 6 months (around 11/12/2025).      Disclaimer: This note was prepared using voice recognition system and is likely to have sound alike errors and is not proof read.  Please call me with any questions.    Answers submitted by the patient for this visit:  Rheumatology Questionnaire (Submitted on 5/12/2025)  fever: No  eye redness: No  mouth sores: No  headaches: No  shortness of breath: No  chest pain: No  trouble swallowing: No  diarrhea: No  constipation: No  unexpected weight change: No  genital sore: No  dysuria: No  During the last 3 days, have you had a skin rash?: No  Bruises or bleeds easily: No  cough: No

## 2025-05-13 DIAGNOSIS — M15.9 GENERALIZED OSTEOARTHRITIS OF MULTIPLE SITES: Primary | ICD-10-CM

## 2025-05-13 DIAGNOSIS — Z12.31 SCREENING MAMMOGRAM, ENCOUNTER FOR: ICD-10-CM

## 2025-05-13 DIAGNOSIS — E11.42 TYPE 2 DIABETES MELLITUS WITH DIABETIC POLYNEUROPATHY, WITHOUT LONG-TERM CURRENT USE OF INSULIN: Chronic | ICD-10-CM

## 2025-05-13 NOTE — TELEPHONE ENCOUNTER
Care Due:                  Date            Visit Type   Department     Provider  --------------------------------------------------------------------------------                                ESTABLISHED                              PATIENT -    [facility]  Last Visit: 11-      [facility]       [address]  Next Visit: None Scheduled  None         None Found                                                            Last  Test          Frequency    Reason                     Performed    Due Date  --------------------------------------------------------------------------------    HBA1C.......  6 months...  OZEMPIC, metFORMIN.......  11- 05-    Lipid Panel.  12 months..  atorvastatin.............  05- 05-    [facility] Embedded Care Due Messages. Reference number: [id number].   5/13/2025 12:10:28 PM CDT

## 2025-05-13 NOTE — TELEPHONE ENCOUNTER
----- Message from Rudy sent at 5/13/2025 12:01 PM CDT -----  Contact: self  Type:  RX Refill RequestWho Called: May MontoyaRefill or New Rx:refillRX Name and Strength:OZEMPIC 1 mg/dose (4 mg/3 mL) How is the patient currently taking it? (ex. 1XDay):Is this a 30 day or 90 day RX:Preferred Pharmacy with phone number:[facility] - [facility] [id number] [facility] [id number] [place] [address]Rxxxf: [phone] Fax: [phone] or Mail Order:localOrdering Provider:Joleen the patient rather a call back or a response via [employer]? Call [facility] Call Back Number:[phone] Information: pt needing a refill of her medication

## 2025-05-13 NOTE — TELEPHONE ENCOUNTER
Refill Routing Note   Medication(s) are not appropriate for processing by [facility] for the following reason(s):        Required labs outdated    [facility] action(s):  Defer     Requires labs : Yes    Medication Therapy Plan: Lab(s) recommended: A1c, Lipid Panel      Appointments  past 12m or future 3m with PCP    Date Provider   Last Visit   11/11/2024 JAMES Lane MD   Next Visit   Visit date not found JAMES Lane MD   ED visits in past 90 days: 0        Note composed:2:29 PM 05/13/2025

## 2025-05-14 ENCOUNTER — PATIENT MESSAGE (OUTPATIENT)
Dept: INTERNAL MEDICINE | Facility: CLINIC | Age: 70
End: 2025-05-14
Payer: MEDICARE

## 2025-05-14 ENCOUNTER — TELEPHONE (OUTPATIENT)
Dept: INTERNAL MEDICINE | Facility: CLINIC | Age: 70
End: 2025-05-14
Payer: MEDICARE

## 2025-05-14 DIAGNOSIS — E11.42 TYPE 2 DIABETES MELLITUS WITH DIABETIC POLYNEUROPATHY, WITHOUT LONG-TERM CURRENT USE OF INSULIN: Primary | ICD-10-CM

## 2025-05-14 RX ORDER — SEMAGLUTIDE 1.34 MG/ML
1 INJECTION, SOLUTION SUBCUTANEOUS
Qty: 2 ML | Refills: 0 | Status: SHIPPED | OUTPATIENT
Start: 2025-05-14

## 2025-05-14 NOTE — TELEPHONE ENCOUNTER
TO MY TEAM:   Please notify her of limited refill and Labs and Appointment required for more refills.  Order the following by written order guidelines and help patient schedule:  A1c [LAB90]  CMP [LAB17]  Lipid Panel [LAB18]  Microalbumin/creatinine urine [DJS651]  DXA Bone Density Axial Skeleton [TRB0956]  Mammo Digital Screening Bilat w/Dilan [NRT8091]  Remind her she has upcoming appointment with Hafsa Hooks NP.  Schedule OV F/U with me in early December (required for further refills).  Thanks!

## 2025-05-26 ENCOUNTER — APPOINTMENT (OUTPATIENT)
Dept: RADIOLOGY | Facility: HOSPITAL | Age: 70
End: 2025-05-26
Attending: FAMILY MEDICINE
Payer: MEDICARE

## 2025-05-26 DIAGNOSIS — M15.9 GENERALIZED OSTEOARTHRITIS OF MULTIPLE SITES: ICD-10-CM

## 2025-05-26 PROCEDURE — 77080 DXA BONE DENSITY AXIAL: CPT | Mod: 26,,, | Performed by: RADIOLOGY

## 2025-05-26 PROCEDURE — 77080 DXA BONE DENSITY AXIAL: CPT | Mod: TC

## 2025-05-30 ENCOUNTER — RESULTS FOLLOW-UP (OUTPATIENT)
Dept: INTERNAL MEDICINE | Facility: CLINIC | Age: 70
End: 2025-05-30

## 2025-05-30 DIAGNOSIS — M85.80 OSTEOPENIA WITH HIGH RISK OF FRACTURE: Primary | ICD-10-CM

## 2025-05-30 NOTE — PROGRESS NOTES
Dr. Carlos Carl.  She has osteopenia with high fracture risk on her most recent DXA.  Would you please discuss her treatment options at her next appointment with you on Monday, November 10th?  Thank you for your help caring for our patients!  -Cornelius

## 2025-05-30 NOTE — PROGRESS NOTES
TO MY TEAM:   Tell her I'm happy to report that her recent lab results are fine.  She has appointment with Hafsa Hooks NP scheduled Tuesday, June 3rd. For continuity of care, can you schedule her for a F/U OV with me, I can give her refills, and then she can cancel her 6/3/25 appointment unless she needs it for another reason.  Tell May I look forward to seeing her soon!

## 2025-06-09 ENCOUNTER — OFFICE VISIT (OUTPATIENT)
Dept: INTERNAL MEDICINE | Facility: CLINIC | Age: 70
End: 2025-06-09
Payer: MEDICARE

## 2025-06-09 VITALS
HEART RATE: 72 BPM | TEMPERATURE: 98 F | OXYGEN SATURATION: 100 % | WEIGHT: 167.31 LBS | RESPIRATION RATE: 18 BRPM | BODY MASS INDEX: 29.64 KG/M2 | DIASTOLIC BLOOD PRESSURE: 72 MMHG | SYSTOLIC BLOOD PRESSURE: 118 MMHG

## 2025-06-09 DIAGNOSIS — M18.0 ARTHRITIS OF CARPOMETACARPAL (CMC) JOINT OF BOTH THUMBS: ICD-10-CM

## 2025-06-09 DIAGNOSIS — M85.80 OSTEOPENIA, UNSPECIFIED LOCATION: Primary | ICD-10-CM

## 2025-06-09 PROCEDURE — 4010F ACE/ARB THERAPY RXD/TAKEN: CPT | Mod: CPTII,S$GLB,, | Performed by: NURSE PRACTITIONER

## 2025-06-09 PROCEDURE — 3288F FALL RISK ASSESSMENT DOCD: CPT | Mod: CPTII,S$GLB,, | Performed by: NURSE PRACTITIONER

## 2025-06-09 PROCEDURE — 1126F AMNT PAIN NOTED NONE PRSNT: CPT | Mod: CPTII,S$GLB,, | Performed by: NURSE PRACTITIONER

## 2025-06-09 PROCEDURE — 99999 PR PBB SHADOW E&M-EST. PATIENT-LVL V: CPT | Mod: PBBFAC,,, | Performed by: NURSE PRACTITIONER

## 2025-06-09 PROCEDURE — 3008F BODY MASS INDEX DOCD: CPT | Mod: CPTII,S$GLB,, | Performed by: NURSE PRACTITIONER

## 2025-06-09 PROCEDURE — 3078F DIAST BP <80 MM HG: CPT | Mod: CPTII,S$GLB,, | Performed by: NURSE PRACTITIONER

## 2025-06-09 PROCEDURE — 1159F MED LIST DOCD IN RCRD: CPT | Mod: CPTII,S$GLB,, | Performed by: NURSE PRACTITIONER

## 2025-06-09 PROCEDURE — 3066F NEPHROPATHY DOC TX: CPT | Mod: CPTII,S$GLB,, | Performed by: NURSE PRACTITIONER

## 2025-06-09 PROCEDURE — 1101F PT FALLS ASSESS-DOCD LE1/YR: CPT | Mod: CPTII,S$GLB,, | Performed by: NURSE PRACTITIONER

## 2025-06-09 PROCEDURE — 99214 OFFICE O/P EST MOD 30 MIN: CPT | Mod: S$GLB,,, | Performed by: NURSE PRACTITIONER

## 2025-06-09 PROCEDURE — 3044F HG A1C LEVEL LT 7.0%: CPT | Mod: CPTII,S$GLB,, | Performed by: NURSE PRACTITIONER

## 2025-06-09 PROCEDURE — 3061F NEG MICROALBUMINURIA REV: CPT | Mod: CPTII,S$GLB,, | Performed by: NURSE PRACTITIONER

## 2025-06-09 PROCEDURE — 3074F SYST BP LT 130 MM HG: CPT | Mod: CPTII,S$GLB,, | Performed by: NURSE PRACTITIONER

## 2025-06-09 RX ORDER — ALENDRONATE SODIUM 70 MG/1
70 TABLET ORAL
Qty: 12 TABLET | Refills: 3 | Status: SHIPPED | OUTPATIENT
Start: 2025-06-09 | End: 2026-06-09

## 2025-06-09 NOTE — PROGRESS NOTES
Subjective:       Patient ID: May Montoya is a 69 y.o. female.    Chief Complaint: Follow-up (Pt presents to clinic for lab work follow-up appt.)    HPI    Pt here for follow up  This appt was actually supposed to be canceled as it was [facility]  [facility] normal labs  Dexa - osteopenia, discussed result note sent per pcp, her rheum MD Dr. WILL also wanted her to start fosamax. She is agreeable. We discussed side effects/admin instructions.    She requesting new thumb splints that have worn for her bilateral OA of thumb joints.    Past Medical History:   Diagnosis Date    Arthritis     Diabetes mellitus     Herniated disc, cervical     Hypertension     Immunocompromised state due to drug therapy 2021    From rheumatologic meds.    Seronegative rheumatoid arthritis 2021    Rheumatologist: Dr. Domingo Gonzalez     Type 2 diabetes mellitus with diabetic polyneuropathy, without long-term current use of insulin 3/29/2021    Type 2 diabetes mellitus, without long-term current use of insulin 3/29/2021     Past Surgical History:   Procedure Laterality Date    BREAST BIOPSY      BREAST SURGERY       SECTION      CHOLECYSTECTOMY      DILATION AND CURETTAGE OF UTERUS      GALLBLADDER SURGERY      HYSTEROSCOPY      JOINT REPLACEMENT      NECK SURGERY  2020    peripheral artery angioplasty      TOTAL KNEE ARTHROPLASTY      L    TUBAL LIGATION       Social History[1]  Review of patient's allergies indicates:   Allergen Reactions    Avelox [moxifloxacin]      Current Outpatient Medications   Medication Sig    amlodipine-benazepril 2.5-10 mg (LOTREL) 2.5-10 mg per capsule Take 1 capsule by mouth once daily.    ascorbic acid, vitamin C, (VITAMIN C) 100 MG tablet Take 100 mg by mouth once daily.    aspirin (ECOTRIN) 81 MG EC tablet Take 81 mg by mouth once daily.    atorvastatin (LIPITOR) 40 MG tablet Take 1 tablet (40 mg total) by mouth every evening.    celecoxib  (CELEBREX) 200 MG capsule Take 1 capsule (200 mg total) by mouth 2 (two) times daily.    cetirizine (ZYRTEC) 5 MG tablet Take 5 mg by mouth once daily.    cholecalciferol, vitamin D3, (VITAMIN D3) 50 mcg (2,000 unit) Cap Take 1 capsule by mouth once daily.    cyanocobalamin (VITAMIN B-12) 100 MCG tablet Take 100 mcg by mouth once daily. Otc    cyclobenzaprine (FLEXERIL) 5 MG tablet Take 1 tablet by mouth nightly as needed.    folic acid (FOLVITE) 1 MG tablet Take 1 tablet (1 mg total) by mouth once daily.    GLUCOSAMINE/D3/BOSWELLIA MAYELIN (OSTEO BI-FLEX, 5-LOXIN, ORAL) Take 2 tablets by mouth once daily.    metFORMIN (GLUCOPHAGE) 500 MG tablet Take 2 tablets (1,000 mg total) by mouth 2 (two) times daily with meals.    methotrexate 2.5 MG Tab Take 8 tablets (20 mg total) by mouth every 7 days.    methylPREDNISolone (MEDROL DOSEPACK) 4 mg tablet     semaglutide (OZEMPIC) 1 mg/dose (4 mg/3 mL) Inject 1 mg into the skin every 7 days.    zinc sulfate (ZINCATE) 50 mg zinc (220 mg) capsule Take 220 mg by mouth once daily.    alendronate (FOSAMAX) 70 MG tablet Take 1 tablet (70 mg total) by mouth every 7 days.     No current facility-administered medications for this visit.           Review of Systems   Constitutional:  Negative for activity change, appetite change, chills, diaphoresis, fatigue, fever and unexpected weight change.   HENT:  Negative for congestion, ear pain, postnasal drip, rhinorrhea, sinus pressure, sinus pain, sneezing, sore throat, tinnitus, trouble swallowing and voice change.    Eyes:  Negative for photophobia, pain and visual disturbance.   Respiratory:  Negative for cough, chest tightness, shortness of breath and wheezing.    Cardiovascular:  Negative for chest pain, palpitations and leg swelling.   Gastrointestinal:  Negative for abdominal distention, abdominal pain, constipation, diarrhea, nausea and vomiting.   Genitourinary:  Negative for decreased urine volume, difficulty urinating, dysuria,  flank pain, frequency, hematuria and urgency.   Musculoskeletal:  Negative for arthralgias, back pain, joint swelling, neck pain and neck stiffness.   Allergic/Immunologic: Negative for immunocompromised state.   Neurological:  Negative for dizziness, tremors, seizures, syncope, facial asymmetry, speech difficulty, weakness, light-headedness, numbness and headaches.   Hematological:  Negative for adenopathy. Does not bruise/bleed easily.   Psychiatric/Behavioral:  Negative for confusion and sleep disturbance.        Objective:      Physical Exam  Vitals reviewed.   Neurological:      Mental Status: She is alert and oriented to person, place, and time.         Assessment:     Vitals:    06/09/25 1428   BP: 118/72   Pulse: 72   Resp: 18   Temp: 98 °F (36.7 °C)         1. Osteopenia, unspecified location    2. Arthritis of carpometacarpal (CMC) joint of both thumbs        Plan:   Osteopenia, unspecified location    Arthritis of carpometacarpal (CMC) joint of both thumbs  -     THUMB ORTHOSIS SPLINT UNIVERSAL FOR HOME USE    Other orders  -     alendronate (FOSAMAX) 70 MG tablet; Take 1 tablet (70 mg total) by mouth every 7 days.  Dispense: 12 tablet; Refill: 3      As above  Pcp 6 mo  Rheum as scheduled in the Fall         [1]   Social History  Socioeconomic History    Marital status:    Tobacco Use    Smoking status: Never     Passive exposure: Past    Smokeless tobacco: Never   Substance and Sexual Activity    Alcohol use: Yes     Comment: 1 or 2  [place] every 3 or 4 months    Drug use: No    Sexual activity: Not Currently     Partners: Male     Birth control/protection: None     Social Drivers of Health     Financial Resource Strain: Low Risk  (11/8/2024)    Overall Financial Resource Strain (CARDIA)     Difficulty of Paying Living Expenses: Not hard at all   Food Insecurity: No Food Insecurity (11/8/2024)    Hunger Vital Sign     Worried About Running Out of Food in the Last Year: Never true     Ran Out  of Food in the Last Year: Never true   Transportation Needs: No Transportation Needs (3/19/2024)    PRAPARE - Transportation     Lack of Transportation (Medical): No     Lack of Transportation (Non-Medical): No   Physical Activity: Unknown (11/8/2024)    Exercise Vital Sign     Days of Exercise per Week: 2 days   Stress: No Stress Concern Present (11/8/2024)    [employer] - Occupational Stress Questionnaire     Feeling of Stress : Not at all   Housing Stability: Low Risk  (3/19/2024)    Housing Stability Vital Sign     Unable to Pay for Housing in the Last Year: No     Number of Places Lived in the Last Year: 1     Unstable Housing in the Last Year: No

## 2025-06-09 NOTE — PATIENT INSTRUCTIONS
Fosamax- Administer first thing in the morning and >=30 minutes before the first food, beverage (except plain water), or other medication(s) of the day. Do not take with mineral water or with other beverages. Patients should be instructed to stay upright (not to lie down) for >=30 minutes and until after first food of the day (to reduce esophageal irritation).

## 2025-07-08 ENCOUNTER — TELEPHONE (OUTPATIENT)
Dept: INTERNAL MEDICINE | Facility: CLINIC | Age: 70
End: 2025-07-08
Payer: MEDICARE

## 2025-07-08 NOTE — TELEPHONE ENCOUNTER
Copied from CRM #[id number]. Topic: General Inquiry - Patient Advice  >> Jul 8, 2025 11:18 AM Ryanne wrote:  Type:  Needs Medical Advice    Who Called: Pt     Would the patient rather a call back or a response via [facility]? Call Back   Best Call Back Number: [phone]    Additional Information: Pt states she is having knee surgery soon and need surgery clearance. She mention she has a check list of things that is require to be complete for procedure clearance. Pt states she has the require paperwork. Pt wants to speak to a member of the clinical team and/or more specifically the nurse.

## 2025-07-09 ENCOUNTER — PATIENT MESSAGE (OUTPATIENT)
Dept: INTERNAL MEDICINE | Facility: CLINIC | Age: 70
End: 2025-07-09
Payer: MEDICARE

## 2025-07-14 ENCOUNTER — OFFICE VISIT (OUTPATIENT)
Dept: INTERNAL MEDICINE | Facility: CLINIC | Age: 70
End: 2025-07-14
Payer: MEDICARE

## 2025-07-14 ENCOUNTER — HOSPITAL ENCOUNTER (OUTPATIENT)
Dept: RADIOLOGY | Facility: HOSPITAL | Age: 70
Discharge: HOME | End: 2025-07-14
Attending: PHYSICIAN ASSISTANT
Payer: MEDICARE

## 2025-07-14 VITALS
TEMPERATURE: 98 F | DIASTOLIC BLOOD PRESSURE: 64 MMHG | BODY MASS INDEX: 29.49 KG/M2 | WEIGHT: 166.44 LBS | OXYGEN SATURATION: 96 % | HEIGHT: 63 IN | HEART RATE: 97 BPM | SYSTOLIC BLOOD PRESSURE: 110 MMHG

## 2025-07-14 DIAGNOSIS — R79.9 ABNORMAL FINDING OF BLOOD CHEMISTRY, UNSPECIFIED: ICD-10-CM

## 2025-07-14 DIAGNOSIS — R79.1 ABNORMAL COAGULATION PROFILE: ICD-10-CM

## 2025-07-14 DIAGNOSIS — Z01.818 PREOP EXAMINATION: ICD-10-CM

## 2025-07-14 DIAGNOSIS — Z01.818 PREOP EXAMINATION: Primary | ICD-10-CM

## 2025-07-14 LAB
ABSOLUTE EOSINOPHIL (OHS): 0.19 K/UL
ABSOLUTE MONOCYTE (OHS): 0.46 K/UL (ref 0.3–1)
ABSOLUTE NEUTROPHIL COUNT (OHS): 2.48 K/UL (ref 1.8–7.7)
ALBUMIN SERPL BCP-MCNC: 3.7 G/DL (ref 3.5–5.2)
ALP SERPL-CCNC: 67 UNIT/L (ref 40–150)
ALT SERPL W/O P-5'-P-CCNC: 20 UNIT/L (ref 10–44)
ANION GAP (OHS): 7 MMOL/L (ref 8–16)
AST SERPL-CCNC: 20 UNIT/L (ref 11–45)
BASOPHILS # BLD AUTO: 0.04 K/UL
BASOPHILS NFR BLD AUTO: 0.9 %
BILIRUB SERPL-MCNC: 0.4 MG/DL (ref 0.1–1)
BUN SERPL-MCNC: 16 MG/DL (ref 8–23)
CALCIUM SERPL-MCNC: 9.2 MG/DL (ref 8.7–10.5)
CHLORIDE SERPL-SCNC: 107 MMOL/L (ref 95–110)
CO2 SERPL-SCNC: 27 MMOL/L (ref 23–29)
CREAT SERPL-MCNC: 0.6 MG/DL (ref 0.5–1.4)
ERYTHROCYTE [DISTWIDTH] IN BLOOD BY AUTOMATED COUNT: 12.9 % (ref 11.5–14.5)
GFR SERPLBLD CREATININE-BSD FMLA CKD-EPI: >60 ML/MIN/1.73/M2
GLUCOSE SERPL-MCNC: 79 MG/DL (ref 70–110)
HCT VFR BLD AUTO: 37.3 % (ref 37–48.5)
HGB BLD-MCNC: 12.3 GM/DL (ref 12–16)
IMM GRANULOCYTES # BLD AUTO: 0.01 K/UL (ref 0–0.04)
IMM GRANULOCYTES NFR BLD AUTO: 0.2 % (ref 0–0.5)
LYMPHOCYTES # BLD AUTO: 1.26 K/UL (ref 1–4.8)
MCH RBC QN AUTO: 31.2 PG (ref 27–31)
MCHC RBC AUTO-ENTMCNC: 33 G/DL (ref 32–36)
MCV RBC AUTO: 95 FL (ref 82–98)
NUCLEATED RBC (/100WBC) (OHS): 0 /100 WBC
PLATELET # BLD AUTO: 328 K/UL (ref 150–450)
PMV BLD AUTO: 10.3 FL (ref 9.2–12.9)
POTASSIUM SERPL-SCNC: 4.3 MMOL/L (ref 3.5–5.1)
PROT SERPL-MCNC: 6.8 GM/DL (ref 6–8.4)
RBC # BLD AUTO: 3.94 M/UL (ref 4–5.4)
RELATIVE EOSINOPHIL (OHS): 4.3 %
RELATIVE LYMPHOCYTE (OHS): 28.4 % (ref 18–48)
RELATIVE MONOCYTE (OHS): 10.4 % (ref 4–15)
RELATIVE NEUTROPHIL (OHS): 55.8 % (ref 38–73)
SODIUM SERPL-SCNC: 141 MMOL/L (ref 136–145)
WBC # BLD AUTO: 4.44 K/UL (ref 3.9–12.7)

## 2025-07-14 PROCEDURE — 83036 HEMOGLOBIN GLYCOSYLATED A1C: CPT | Performed by: PHYSICIAN ASSISTANT

## 2025-07-14 PROCEDURE — 71046 X-RAY EXAM CHEST 2 VIEWS: CPT | Mod: TC

## 2025-07-14 PROCEDURE — 4010F ACE/ARB THERAPY RXD/TAKEN: CPT | Mod: CPTII,S$GLB,, | Performed by: PHYSICIAN ASSISTANT

## 2025-07-14 PROCEDURE — 99999 PR PBB SHADOW E&M-EST. PATIENT-LVL V: CPT | Mod: PBBFAC,,, | Performed by: PHYSICIAN ASSISTANT

## 2025-07-14 PROCEDURE — 80053 COMPREHEN METABOLIC PANEL: CPT | Performed by: PHYSICIAN ASSISTANT

## 2025-07-14 PROCEDURE — 3061F NEG MICROALBUMINURIA REV: CPT | Mod: CPTII,S$GLB,, | Performed by: PHYSICIAN ASSISTANT

## 2025-07-14 PROCEDURE — 1159F MED LIST DOCD IN RCRD: CPT | Mod: CPTII,S$GLB,, | Performed by: PHYSICIAN ASSISTANT

## 2025-07-14 PROCEDURE — 99214 OFFICE O/P EST MOD 30 MIN: CPT | Mod: S$GLB,,, | Performed by: PHYSICIAN ASSISTANT

## 2025-07-14 PROCEDURE — 87081 CULTURE SCREEN ONLY: CPT | Performed by: PHYSICIAN ASSISTANT

## 2025-07-14 PROCEDURE — 3066F NEPHROPATHY DOC TX: CPT | Mod: CPTII,S$GLB,, | Performed by: PHYSICIAN ASSISTANT

## 2025-07-14 PROCEDURE — 3044F HG A1C LEVEL LT 7.0%: CPT | Mod: CPTII,S$GLB,, | Performed by: PHYSICIAN ASSISTANT

## 2025-07-14 PROCEDURE — 3074F SYST BP LT 130 MM HG: CPT | Mod: CPTII,S$GLB,, | Performed by: PHYSICIAN ASSISTANT

## 2025-07-14 PROCEDURE — 71046 X-RAY EXAM CHEST 2 VIEWS: CPT | Mod: 26,,, | Performed by: RADIOLOGY

## 2025-07-14 PROCEDURE — 87641 MR-STAPH DNA AMP PROBE: CPT | Performed by: PHYSICIAN ASSISTANT

## 2025-07-14 PROCEDURE — 3008F BODY MASS INDEX DOCD: CPT | Mod: CPTII,S$GLB,, | Performed by: PHYSICIAN ASSISTANT

## 2025-07-14 PROCEDURE — 1126F AMNT PAIN NOTED NONE PRSNT: CPT | Mod: CPTII,S$GLB,, | Performed by: PHYSICIAN ASSISTANT

## 2025-07-14 PROCEDURE — 3078F DIAST BP <80 MM HG: CPT | Mod: CPTII,S$GLB,, | Performed by: PHYSICIAN ASSISTANT

## 2025-07-14 PROCEDURE — 85025 COMPLETE CBC W/AUTO DIFF WBC: CPT | Performed by: PHYSICIAN ASSISTANT

## 2025-07-14 NOTE — PROGRESS NOTES
Subjective:      Patient ID: Mya Montoya is a 69 y.o. female.    Chief Complaint: Pre-op Exam      HPI  Here today for preop evaluation.    Seeing cardioology tomorrow for eval as well, who will provide a separate evaluation.     Surgery:  right total knee    Date of surgery:7/23/25   Surgeon / group: Dr. Evans - [facility]      Any complications or issues with prior surgery?  no   Any complications or issues with prior anesthesia?no   Are you taking an injection for weight loss? Or a weekly shot for diabetes?no    Are you currently being treated for cancer?no   Do you have any shortness of breath?no   Do you have any chest pain while at rest?no   Do you have congestive heart failure?no   Have you ever had a heart attack?no   Do you take medication for high blood pressure?yes   Have you ever had a stroke?no   Do you have COPD?no   Do you have Kidney disease?no   Do you have ascites or esophageal varices?no   Do you take daily steroids?no   Have your recently lost weight and do not know why?no   Do you have any bleeding disorders?no        Review of Systems   Constitutional:  Negative for activity change, appetite change, chills, diaphoresis, fatigue and unexpected weight change.   Eyes:  Negative for visual disturbance.   Respiratory:  Negative for cough, chest tightness, shortness of breath and wheezing.    Cardiovascular:  Negative for chest pain, palpitations and leg swelling.   Gastrointestinal:  Negative for abdominal pain, constipation, nausea and vomiting.   Neurological:  Negative for dizziness, vertigo, tremors, syncope, weakness, light-headedness, numbness and headaches.   Psychiatric/Behavioral:  Negative for agitation, behavioral problems, confusion, decreased concentration, dysphoric mood, hallucinations, self-injury, sleep disturbance and suicidal ideas. The patient is not nervous/anxious and is not hyperactive.        Medication List with Changes/Refills   Current Medications     "ALENDRONATE (FOSAMAX) 70 MG TABLET    Take 1 tablet (70 mg total) by mouth every 7 days.    AMLODIPINE-BENAZEPRIL 2.5-10 MG (LOTREL) 2.5-10 MG PER CAPSULE    Take 1 capsule by mouth once daily.    ASCORBIC ACID, VITAMIN C, (VITAMIN C) 100 MG TABLET    Take 100 mg by mouth once daily.    ASPIRIN (ECOTRIN) 81 MG EC TABLET    Take 81 mg by mouth once daily.    ATORVASTATIN (LIPITOR) 40 MG TABLET    Take 1 tablet (40 mg total) by mouth every evening.    CELECOXIB (CELEBREX) 200 MG CAPSULE    Take 1 capsule (200 mg total) by mouth 2 (two) times daily.    CETIRIZINE (ZYRTEC) 5 MG TABLET    Take 5 mg by mouth once daily.    CHOLECALCIFEROL, VITAMIN D3, (VITAMIN D3) 50 MCG (2,000 UNIT) CAP    Take 1 capsule by mouth once daily.    CYANOCOBALAMIN (VITAMIN B-12) 100 MCG TABLET    Take 100 mcg by mouth once daily. Otc    CYCLOBENZAPRINE (FLEXERIL) 5 MG TABLET    Take 1 tablet by mouth nightly as needed.    FOLIC ACID (FOLVITE) 1 MG TABLET    Take 1 tablet (1 mg total) by mouth once daily.    GLUCOSAMINE/D3/BOSWELLIA MAYELIN (OSTEO BI-FLEX, 5-LOXIN, ORAL)    Take 2 tablets by mouth once daily.    METFORMIN (GLUCOPHAGE) 500 MG TABLET    Take 2 tablets (1,000 mg total) by mouth 2 (two) times daily with meals.    METHOTREXATE 2.5 MG TAB    Take 8 tablets (20 mg total) by mouth every 7 days.    METHYLPREDNISOLONE (MEDROL DOSEPACK) 4 MG TABLET        SEMAGLUTIDE (OZEMPIC) 1 MG/DOSE (4 MG/3 ML)    Inject 1 mg into the skin every 7 days.    ZINC SULFATE (ZINCATE) 50 MG ZINC (220 MG) CAPSULE    Take 220 mg by mouth once daily.        Objective:     Vitals:    07/14/25 1450   BP: 110/64   Pulse: 97   Temp: 98.2 °F (36.8 °C)   TempSrc: Tympanic   SpO2: 96%   Weight: 75.5 kg (166 lb 7.2 oz)   Height: 5' 3" (1.6 m)        Physical Exam  Constitutional:       Appearance: Normal appearance. She is normal weight.   HENT:      Head: Normocephalic and atraumatic.      Nose: Nose normal.   Eyes:      Conjunctiva/sclera: Conjunctivae normal.      " Comments: Eyes tracking normal on exam    Cardiovascular:      Rate and Rhythm: Normal rate and regular rhythm.      Pulses: Normal pulses.      Heart sounds: Normal heart sounds. No murmur heard.     No friction rub. No gallop.   Pulmonary:      Effort: Pulmonary effort is normal. No respiratory distress.      Breath sounds: Normal breath sounds. No stridor. No wheezing, rhonchi or rales.   Musculoskeletal:      Right lower leg: No edema.      Left lower leg: No edema.      Comments: Moves all extremities well and with good control  Normal gait observed      Skin:     General: Skin is warm and dry.      Capillary Refill: Capillary refill takes less than 2 seconds.   Neurological:      Mental Status: She is alert and oriented to person, place, and time.   Psychiatric:         Mood and Affect: Mood normal.         Behavior: Behavior normal.         Thought Content: Thought content normal.         Judgment: Judgment normal.            Assessment & Plan:     1. Preop examination  -     X-Ray Chest PA And Lateral; Future; Expected date: 07/14/2025  -     MRSA Screen by PCR  -     CBC Auto Differential  -     Comprehensive Metabolic Panel  -     Hemoglobin A1C  -     APTT; Future; Expected date: 07/14/2025  -     Culture, MRSA    2. Abnormal finding of blood chemistry, unspecified  -     Hemoglobin A1C    3. Abnormal coagulation profile  -     APTT; Future; Expected date: 07/14/2025         Pre op Evaluation  -Per the Preoperative Mortality Predictor Score, the patient has a .1% risk of perioperative mortality.    -Labs, CXR, and EKG reviewed.      -Based on exam, imaging, labs, and risk prediction patient is medically optimized from a primary care stand point.              Carey Valdovinos PA-C

## 2025-07-15 ENCOUNTER — LAB VISIT (OUTPATIENT)
Dept: LAB | Facility: HOSPITAL | Age: 70
End: 2025-07-15
Attending: PHYSICIAN ASSISTANT
Payer: MEDICARE

## 2025-07-15 ENCOUNTER — TELEPHONE (OUTPATIENT)
Dept: INTERNAL MEDICINE | Facility: CLINIC | Age: 70
End: 2025-07-15
Payer: MEDICARE

## 2025-07-15 DIAGNOSIS — Z01.818 PREOP EXAMINATION: ICD-10-CM

## 2025-07-15 DIAGNOSIS — R94.5 ABNORMAL RESULTS OF LIVER FUNCTION STUDIES: ICD-10-CM

## 2025-07-15 DIAGNOSIS — R79.1 ABNORMAL COAGULATION PROFILE: ICD-10-CM

## 2025-07-15 DIAGNOSIS — E11.42 TYPE 2 DIABETES MELLITUS WITH DIABETIC POLYNEUROPATHY, WITHOUT LONG-TERM CURRENT USE OF INSULIN: Chronic | ICD-10-CM

## 2025-07-15 LAB
APTT PPP: 28.5 SECONDS (ref 21–32)
EAG (OHS): 111 MG/DL (ref 68–131)
HBA1C MFR BLD: 5.5 % (ref 4–5.6)
INR PPP: 1 (ref 0.8–1.2)
MRSA PCR SCRN (OHS): NOT DETECTED
PROTHROMBIN TIME: 10.9 SECONDS (ref 9–12.5)

## 2025-07-15 PROCEDURE — 85730 THROMBOPLASTIN TIME PARTIAL: CPT

## 2025-07-15 PROCEDURE — 36415 COLL VENOUS BLD VENIPUNCTURE: CPT

## 2025-07-15 PROCEDURE — 85610 PROTHROMBIN TIME: CPT

## 2025-07-15 NOTE — TELEPHONE ENCOUNTER
I tried calling the patient with no answer. A message was left for a return to Ms Valdovinos's office.

## 2025-07-16 ENCOUNTER — TELEPHONE (OUTPATIENT)
Dept: INTERNAL MEDICINE | Facility: CLINIC | Age: 70
End: 2025-07-16
Payer: MEDICARE

## 2025-07-16 LAB — MRSA SPEC QL CULT: NORMAL

## 2025-07-16 NOTE — TELEPHONE ENCOUNTER
Copied from CRM #[id number]. Topic: General Inquiry - Return Call  >> Jul 16, 2025 10:23 AM Amie wrote:  Type:  Patient Returning Call    Who Called:May   Who Left Message for Patient: Carolbelinda Morris   Does the patient know what this is regarding?: lab test   Would the patient rather a call back or a response via [facility]?  Call back   Best Call Back Number: [phone]    Additional Information: Possibly regarding her surgery     ThanksCARLOS

## 2025-07-16 NOTE — TELEPHONE ENCOUNTER
I called patient back to let her know that we faxed everything over to the orthopedic's office. I told her that she's ready to go for surgery. Patient stated her understanding.

## 2025-07-26 DIAGNOSIS — M06.00 SERONEGATIVE RHEUMATOID ARTHRITIS: ICD-10-CM

## 2025-07-26 DIAGNOSIS — M19.041 PRIMARY OSTEOARTHRITIS OF BOTH HANDS: ICD-10-CM

## 2025-07-26 DIAGNOSIS — M15.9 GENERALIZED OSTEOARTHRITIS OF MULTIPLE SITES: Chronic | ICD-10-CM

## 2025-07-26 DIAGNOSIS — M19.042 PRIMARY OSTEOARTHRITIS OF BOTH HANDS: ICD-10-CM

## 2025-07-28 RX ORDER — CELECOXIB 200 MG/1
200 CAPSULE ORAL 2 TIMES DAILY
Qty: 180 CAPSULE | Refills: 1 | Status: SHIPPED | OUTPATIENT
Start: 2025-07-28

## 2025-07-28 RX ORDER — FOLIC ACID 1 MG/1
1 TABLET ORAL DAILY
Qty: 90 TABLET | Refills: 1 | Status: SHIPPED | OUTPATIENT
Start: 2025-07-28

## 2025-08-11 ENCOUNTER — OFFICE VISIT (OUTPATIENT)
Dept: OPHTHALMOLOGY | Facility: CLINIC | Age: 70
End: 2025-08-11
Payer: MEDICARE

## 2025-08-11 ENCOUNTER — LAB VISIT (OUTPATIENT)
Dept: LAB | Facility: HOSPITAL | Age: 70
End: 2025-08-11
Attending: INTERNAL MEDICINE
Payer: MEDICARE

## 2025-08-11 DIAGNOSIS — E11.9 DIABETES MELLITUS WITHOUT COMPLICATION: Primary | ICD-10-CM

## 2025-08-11 DIAGNOSIS — H52.4 PRESBYOPIA: ICD-10-CM

## 2025-08-11 DIAGNOSIS — M06.00 SERONEGATIVE RHEUMATOID ARTHRITIS: ICD-10-CM

## 2025-08-11 DIAGNOSIS — Z51.81 ENCOUNTER FOR MEDICATION MONITORING: ICD-10-CM

## 2025-08-11 DIAGNOSIS — H26.493 PCO (POSTERIOR CAPSULAR OPACIFICATION), BILATERAL: ICD-10-CM

## 2025-08-11 DIAGNOSIS — Z96.1 PSEUDOPHAKIA OF BOTH EYES: ICD-10-CM

## 2025-08-11 DIAGNOSIS — Z79.899 DRUG-INDUCED IMMUNODEFICIENCY: ICD-10-CM

## 2025-08-11 DIAGNOSIS — D84.821 DRUG-INDUCED IMMUNODEFICIENCY: ICD-10-CM

## 2025-08-11 LAB
ABSOLUTE EOSINOPHIL (OHS): 0.24 K/UL
ABSOLUTE MONOCYTE (OHS): 0.49 K/UL (ref 0.3–1)
ABSOLUTE NEUTROPHIL COUNT (OHS): 3.93 K/UL (ref 1.8–7.7)
ALBUMIN SERPL BCP-MCNC: 4 G/DL (ref 3.5–5.2)
ALP SERPL-CCNC: 68 UNIT/L (ref 40–150)
ALT SERPL W/O P-5'-P-CCNC: 18 UNIT/L (ref 10–44)
ANION GAP (OHS): 13 MMOL/L (ref 8–16)
AST SERPL-CCNC: 53 UNIT/L (ref 11–45)
BASOPHILS # BLD AUTO: 0.05 K/UL
BASOPHILS NFR BLD AUTO: 0.8 %
BILIRUB SERPL-MCNC: 0.5 MG/DL (ref 0.1–1)
BUN SERPL-MCNC: 11 MG/DL (ref 8–23)
CALCIUM SERPL-MCNC: 9 MG/DL (ref 8.7–10.5)
CHLORIDE SERPL-SCNC: 104 MMOL/L (ref 95–110)
CO2 SERPL-SCNC: 23 MMOL/L (ref 23–29)
CREAT SERPL-MCNC: 0.6 MG/DL (ref 0.5–1.4)
CRP SERPL-MCNC: 5.8 MG/L
ERYTHROCYTE [DISTWIDTH] IN BLOOD BY AUTOMATED COUNT: 13.2 % (ref 11.5–14.5)
ERYTHROCYTE [SEDIMENTATION RATE] IN BLOOD BY PHOTOMETRIC METHOD: 16 MM/HR
GFR SERPLBLD CREATININE-BSD FMLA CKD-EPI: >60 ML/MIN/1.73/M2
GLUCOSE SERPL-MCNC: 97 MG/DL (ref 70–110)
HCT VFR BLD AUTO: 37 % (ref 37–48.5)
HGB BLD-MCNC: 12.3 GM/DL (ref 12–16)
IMM GRANULOCYTES # BLD AUTO: 0.01 K/UL (ref 0–0.04)
IMM GRANULOCYTES NFR BLD AUTO: 0.2 % (ref 0–0.5)
LYMPHOCYTES # BLD AUTO: 1.18 K/UL (ref 1–4.8)
MCH RBC QN AUTO: 31.4 PG (ref 27–31)
MCHC RBC AUTO-ENTMCNC: 33.2 G/DL (ref 32–36)
MCV RBC AUTO: 94 FL (ref 82–98)
NUCLEATED RBC (/100WBC) (OHS): 0 /100 WBC
PLATELET # BLD AUTO: 318 K/UL (ref 150–450)
PMV BLD AUTO: 9.4 FL (ref 9.2–12.9)
POTASSIUM SERPL-SCNC: 3.9 MMOL/L (ref 3.5–5.1)
PROT SERPL-MCNC: 7 GM/DL (ref 6–8.4)
RBC # BLD AUTO: 3.92 M/UL (ref 4–5.4)
RELATIVE EOSINOPHIL (OHS): 4.1 %
RELATIVE LYMPHOCYTE (OHS): 20 % (ref 18–48)
RELATIVE MONOCYTE (OHS): 8.3 % (ref 4–15)
RELATIVE NEUTROPHIL (OHS): 66.6 % (ref 38–73)
SODIUM SERPL-SCNC: 140 MMOL/L (ref 136–145)
WBC # BLD AUTO: 5.9 K/UL (ref 3.9–12.7)

## 2025-08-11 PROCEDURE — 2023F DILAT RTA XM W/O RTNOPTHY: CPT | Mod: CPTII,S$GLB,, | Performed by: OPTOMETRIST

## 2025-08-11 PROCEDURE — 1159F MED LIST DOCD IN RCRD: CPT | Mod: CPTII,S$GLB,, | Performed by: OPTOMETRIST

## 2025-08-11 PROCEDURE — 3044F HG A1C LEVEL LT 7.0%: CPT | Mod: CPTII,S$GLB,, | Performed by: OPTOMETRIST

## 2025-08-11 PROCEDURE — 99999 PR PBB SHADOW E&M-EST. PATIENT-LVL III: CPT | Mod: PBBFAC,,, | Performed by: OPTOMETRIST

## 2025-08-11 PROCEDURE — 85652 RBC SED RATE AUTOMATED: CPT

## 2025-08-11 PROCEDURE — 82040 ASSAY OF SERUM ALBUMIN: CPT

## 2025-08-11 PROCEDURE — 85025 COMPLETE CBC W/AUTO DIFF WBC: CPT

## 2025-08-11 PROCEDURE — 36415 COLL VENOUS BLD VENIPUNCTURE: CPT

## 2025-08-11 PROCEDURE — 4010F ACE/ARB THERAPY RXD/TAKEN: CPT | Mod: CPTII,S$GLB,, | Performed by: OPTOMETRIST

## 2025-08-11 PROCEDURE — 92014 COMPRE OPH EXAM EST PT 1/>: CPT | Mod: S$GLB,,, | Performed by: OPTOMETRIST

## 2025-08-11 PROCEDURE — 3066F NEPHROPATHY DOC TX: CPT | Mod: CPTII,S$GLB,, | Performed by: OPTOMETRIST

## 2025-08-11 PROCEDURE — 3061F NEG MICROALBUMINURIA REV: CPT | Mod: CPTII,S$GLB,, | Performed by: OPTOMETRIST

## 2025-08-11 PROCEDURE — 86140 C-REACTIVE PROTEIN: CPT

## 2025-08-11 PROCEDURE — 1160F RVW MEDS BY RX/DR IN RCRD: CPT | Mod: CPTII,S$GLB,, | Performed by: OPTOMETRIST

## 2025-08-13 ENCOUNTER — TELEPHONE (OUTPATIENT)
Dept: RHEUMATOLOGY | Facility: CLINIC | Age: 70
End: 2025-08-13
Payer: MEDICARE

## 2025-08-18 DIAGNOSIS — E11.69 HYPERLIPIDEMIA ASSOCIATED WITH TYPE 2 DIABETES MELLITUS: Chronic | ICD-10-CM

## 2025-08-18 DIAGNOSIS — E78.5 HYPERLIPIDEMIA ASSOCIATED WITH TYPE 2 DIABETES MELLITUS: Chronic | ICD-10-CM

## 2025-08-18 RX ORDER — ATORVASTATIN CALCIUM 40 MG/1
40 TABLET, FILM COATED ORAL NIGHTLY
Qty: 90 TABLET | Refills: 0 | Status: SHIPPED | OUTPATIENT
Start: 2025-08-18